# Patient Record
Sex: MALE | Race: WHITE | NOT HISPANIC OR LATINO | Employment: OTHER | URBAN - METROPOLITAN AREA
[De-identification: names, ages, dates, MRNs, and addresses within clinical notes are randomized per-mention and may not be internally consistent; named-entity substitution may affect disease eponyms.]

---

## 2022-01-05 ENCOUNTER — HOSPITAL ENCOUNTER (EMERGENCY)
Facility: HOSPITAL | Age: 87
Discharge: HOME | End: 2022-01-05
Attending: EMERGENCY MEDICINE
Payer: MEDICARE

## 2022-01-05 VITALS
DIASTOLIC BLOOD PRESSURE: 76 MMHG | BODY MASS INDEX: 28.44 KG/M2 | HEIGHT: 72 IN | OXYGEN SATURATION: 98 % | HEART RATE: 79 BPM | RESPIRATION RATE: 18 BRPM | SYSTOLIC BLOOD PRESSURE: 176 MMHG | WEIGHT: 210 LBS | TEMPERATURE: 97 F

## 2022-01-05 DIAGNOSIS — E86.0 DEHYDRATION: Primary | ICD-10-CM

## 2022-01-05 DIAGNOSIS — U07.1 COVID: ICD-10-CM

## 2022-01-05 LAB
ALBUMIN SERPL-MCNC: 3.6 G/DL (ref 3.4–5)
ALP SERPL-CCNC: 96 IU/L (ref 35–126)
ALT SERPL-CCNC: 27 IU/L (ref 16–63)
ANION GAP SERPL CALC-SCNC: 7 MEQ/L (ref 3–15)
AST SERPL-CCNC: 24 IU/L (ref 15–41)
BASOPHILS # BLD: 0 K/UL (ref 0.01–0.1)
BASOPHILS NFR BLD: 0 %
BILIRUB SERPL-MCNC: 0.4 MG/DL (ref 0.3–1.2)
BUN SERPL-MCNC: 18 MG/DL (ref 8–20)
BURR CELLS BLD QL SMEAR: ABNORMAL
CALCIUM SERPL-MCNC: 8.9 MG/DL (ref 8.9–10.3)
CHLORIDE SERPL-SCNC: 104 MEQ/L (ref 98–109)
CO2 SERPL-SCNC: 26 MEQ/L (ref 22–32)
CREAT SERPL-MCNC: 0.8 MG/DL (ref 0.8–1.3)
DIFFERENTIAL METHOD BLD: ABNORMAL
EOSINOPHIL # BLD: 0.01 K/UL (ref 0.04–0.54)
EOSINOPHIL NFR BLD: 0.3 %
ERYTHROCYTE [DISTWIDTH] IN BLOOD BY AUTOMATED COUNT: 13 % (ref 11.6–14.4)
GFR SERPL CREATININE-BSD FRML MDRD: >60 ML/MIN/1.73M*2
GLUCOSE SERPL-MCNC: 126 MG/DL (ref 70–99)
HCT VFR BLDCO AUTO: 41.3 % (ref 40.1–51)
HGB BLD-MCNC: 13.8 G/DL (ref 13.7–17.5)
IMM GRANULOCYTES # BLD AUTO: 0.01 K/UL (ref 0–0.08)
IMM GRANULOCYTES NFR BLD AUTO: 0.3 %
LYMPHOCYTES # BLD: 1.1 K/UL (ref 1.2–3.5)
LYMPHOCYTES NFR BLD: 30.2 %
MCH RBC QN AUTO: 32.8 PG (ref 28–33.2)
MCHC RBC AUTO-ENTMCNC: 33.4 G/DL (ref 32.2–36.5)
MCV RBC AUTO: 98.1 FL (ref 83–98)
MONOCYTES # BLD: 0.37 K/UL (ref 0.3–1)
MONOCYTES NFR BLD: 10.2 %
NEUTROPHILS # BLD: 2.15 K/UL (ref 1.7–7)
NEUTS SEG NFR BLD: 59 %
NRBC BLD-RTO: 0 %
PDW BLD AUTO: 11.5 FL (ref 9.4–12.4)
PLAT MORPH BLD: NORMAL
PLATELET # BLD AUTO: 130 K/UL (ref 150–350)
PLATELET # BLD EST: ABNORMAL 10*3/UL
POTASSIUM SERPL-SCNC: 4.3 MEQ/L (ref 3.6–5.1)
PROT SERPL-MCNC: 6.5 G/DL (ref 6–8.2)
RBC # BLD AUTO: 4.21 M/UL (ref 4.5–5.8)
SODIUM SERPL-SCNC: 137 MEQ/L (ref 136–144)
WBC # BLD AUTO: 3.64 K/UL (ref 3.8–10.5)

## 2022-01-05 PROCEDURE — 80053 COMPREHEN METABOLIC PANEL: CPT | Performed by: EMERGENCY MEDICINE

## 2022-01-05 PROCEDURE — 85025 COMPLETE CBC W/AUTO DIFF WBC: CPT | Performed by: EMERGENCY MEDICINE

## 2022-01-05 PROCEDURE — 36415 COLL VENOUS BLD VENIPUNCTURE: CPT

## 2022-01-05 PROCEDURE — 96360 HYDRATION IV INFUSION INIT: CPT

## 2022-01-05 PROCEDURE — 99284 EMERGENCY DEPT VISIT MOD MDM: CPT | Mod: 25

## 2022-01-05 PROCEDURE — 25800000 HC PHARMACY IV SOLUTIONS: Performed by: PHYSICIAN ASSISTANT

## 2022-01-05 PROCEDURE — 3E0337Z INTRODUCTION OF ELECTROLYTIC AND WATER BALANCE SUBSTANCE INTO PERIPHERAL VEIN, PERCUTANEOUS APPROACH: ICD-10-PCS | Performed by: EMERGENCY MEDICINE

## 2022-01-05 RX ORDER — MIRABEGRON 50 MG/1
50 TABLET, FILM COATED, EXTENDED RELEASE ORAL DAILY
COMMUNITY
Start: 2021-12-01 | End: 2023-07-01

## 2022-01-05 RX ORDER — VALACYCLOVIR HYDROCHLORIDE 500 MG/1
500 TABLET, FILM COATED ORAL EVERY MORNING
COMMUNITY
Start: 2022-01-04

## 2022-01-05 RX ORDER — METOPROLOL SUCCINATE 25 MG/1
12.5 TABLET, EXTENDED RELEASE ORAL
Status: ON HOLD | COMMUNITY
Start: 2021-12-20 | End: 2023-07-01 | Stop reason: SDUPTHER

## 2022-01-05 RX ORDER — AMLODIPINE BESYLATE 2.5 MG/1
TABLET ORAL
COMMUNITY
Start: 2021-12-19 | End: 2023-07-01

## 2022-01-05 RX ADMIN — SODIUM CHLORIDE 1000 ML: 9 INJECTION, SOLUTION INTRAVENOUS at 13:25

## 2022-01-05 ASSESSMENT — ENCOUNTER SYMPTOMS
COLOR CHANGE: 0
WEAKNESS: 0
SORE THROAT: 0
SEIZURES: 0
ABDOMINAL PAIN: 0
NECK PAIN: 0
SPEECH DIFFICULTY: 0
AGITATION: 0
DIAPHORESIS: 0
BACK PAIN: 0
FACIAL SWELLING: 0
HEADACHES: 0
ACTIVITY CHANGE: 0
NAUSEA: 0
FEVER: 0
DIARRHEA: 0
HEMATURIA: 0
SHORTNESS OF BREATH: 0
WHEEZING: 0
VOMITING: 0
COUGH: 1
DIFFICULTY URINATING: 0

## 2022-01-05 NOTE — ED ATTESTATION NOTE
The patient was evaluated and managed by the PA/NP/resident.  I have discussed the case with the PA/NP/resident and I have reviewed the patients lab work.  I am in agreement with the ED workup and treatment plan.  I will continue to be available for consultation as needed.     Godshall, Duane K, MD  01/05/22 1675

## 2022-01-06 NOTE — ED PROVIDER NOTES
"Emergency Medicine Note  HPI   HISTORY OF PRESENT ILLNESS     Patient is an 89-year-old male with a history of heart disease, prostate cancer who presents today with dehydration.  Patient states that he was diagnosed with CovidSeveral days ago.  Patient states that he took ivermectin and \"feels better\".  Patient went to receive monoclonal antibodies in [facility] but they were unable to obtain an IV.  Patient went back another day and they were still unable to obtain an IV.  Patient states that they told him that he must be dehydrated and sent him to the emergency room for hydration.  Patient has no other complaints.  Denied current fever, congestion, shortness of breath, syncope, seizure.            Patient History   PAST HISTORY     Reviewed from Nursing Triage:       Past Medical History:   Diagnosis Date   • CAD (coronary artery disease)    • Hx of ventricular tachycardia    • Prostate cancer (CMS/HCC)        History reviewed. No pertinent surgical history.    History reviewed. No pertinent family history.    Social History     Tobacco Use   • Smoking status: Never Smoker   • Smokeless tobacco: Never Used   Vaping Use   • Vaping Use: Never used   Substance Use Topics   • Alcohol use: Yes     Comment: socially   • Drug use: Never         Review of Systems   REVIEW OF SYSTEMS     Review of Systems   Constitutional: Negative for activity change, diaphoresis and fever.   HENT: Negative for facial swelling and sore throat.    Eyes: Negative for visual disturbance.   Respiratory: Positive for cough. Negative for shortness of breath and wheezing.    Cardiovascular: Negative for chest pain and leg swelling.   Gastrointestinal: Negative for abdominal pain, diarrhea, nausea and vomiting.   Genitourinary: Negative for difficulty urinating and hematuria.   Musculoskeletal: Negative for back pain and neck pain.   Skin: Negative for color change and pallor.   Neurological: Negative for seizures, syncope, speech difficulty, " weakness and headaches.   Psychiatric/Behavioral: Negative for agitation and behavioral problems.   All other systems reviewed and are negative.        VITALS     ED Vitals    Date/Time Temp Pulse Resp BP SpO2 [facility]   01/05/22 1425 -- 79 18 176/76 98 % [facility]   01/05/22 1315 -- 72 18 157/72 96 % [facility]   01/05/22 1226 36.1 °C (97 °F) 76 18 147/65 98 % [facility]                       Physical Exam   PHYSICAL EXAM     Physical Exam  Vitals and nursing note reviewed.   Constitutional:       General: He is not in acute distress.     Appearance: Normal appearance. He is well-developed and normal weight. He is not ill-appearing, toxic-appearing or diaphoretic.   HENT:      Head: Normocephalic and atraumatic.      Nose: Nose normal.      Mouth/Throat:      Mouth: Mucous membranes are moist.   Eyes:      Conjunctiva/sclera: Conjunctivae normal.   Cardiovascular:      Rate and Rhythm: Normal rate and regular rhythm.   Pulmonary:      Effort: Pulmonary effort is normal.      Breath sounds: Normal breath sounds.   Abdominal:      General: There is no distension.      Palpations: Abdomen is soft. There is no mass.      Tenderness: There is no abdominal tenderness.   Musculoskeletal:         General: No tenderness or deformity. Normal range of motion.      Cervical back: Normal range of motion.   Skin:     General: Skin is warm and dry.      Findings: No rash.   Neurological:      General: No focal deficit present.      Mental Status: He is alert and oriented to person, place, and time. Mental status is at baseline.      Motor: No weakness.      Coordination: Coordination normal.      Gait: Gait normal.   Psychiatric:         Mood and Affect: Mood normal.         Behavior: Behavior normal.         Thought Content: Thought content normal.         Judgment: Judgment normal.           PROCEDURES     Procedures     DATA     Results     Procedure Component Value Units Date/Time    [facility] Draw Panel [[id number]] Collected: 01/05/22 1242    Specimen:  Blood, Venous Updated: 01/05/22 2100    Narrative:      The following orders were created for panel order [facility] Draw Panel.  Procedure                               Abnormality         Status                     ---------                               -----------         ------                     RAINBOW RED[[id number]]                                      Final result               CINTHIA LT BLUE[[id number]]                                  Final result               CINTHIA GOLD[[id number]]                                     Final result                 Please view results for these tests on the individual orders.    RAINBOW RED [[id number]] Collected: 01/05/22 1242    Specimen: Blood, Venous Updated: 01/05/22 2100    CINTHIA LT BLUE [[id number]] Collected: 01/05/22 1242    Specimen: Blood, Venous Updated: 01/05/22 2100    RAINBOW GOLD [[id number]] Collected: 01/05/22 1242    Specimen: Blood, Venous Updated: 01/05/22 2100    CBC and differential [[id number]]  (Abnormal) Collected: 01/05/22 1242    Specimen: Blood, Venous Updated: 01/05/22 1313     WBC 3.64 K/uL      RBC 4.21 M/uL      Hemoglobin 13.8 g/dL      Hematocrit 41.3 %      MCV 98.1 fL      MCH 32.8 pg      MCHC 33.4 g/dL      RDW 13.0 %      Platelets 130 K/uL      Comment: ALL RESULTS HAVE BEEN RECHECKED. SPECIMEN QUALITY CHECKED        MPV 11.5 fL      Differential Type Auto     nRBC 0.0 %      Immature Granulocytes 0.3 %      Neutrophils 59.0 %      Lymphocytes 30.2 %      Monocytes 10.2 %      Eosinophils 0.3 %      Basophils 0.0 %      Immature Granulocytes, Absolute 0.01 K/uL      Neutrophils, Absolute 2.15 K/uL      Lymphocytes, Absolute 1.10 K/uL      Monocytes, Absolute 0.37 K/uL      Eosinophils, Absolute 0.01 K/uL      Basophils, Absolute 0.00 K/uL      PLT Morphology Normal     Platelet Estimate Decreased (51,000-149,000)     Naomie Cells Occasional    Comprehensive metabolic panel [[id number]]  (Abnormal) Collected: 01/05/22 1242    Specimen:  "Blood, Venous Updated: 01/05/22 1310     Sodium 137 mEQ/L      Potassium 4.3 mEQ/L      Comment: Results obtained on plasma. Plasma Potassium values may be up to 0.4 mEQ/L less than serum values. The differences may be greater for patients with high platelet or white cell counts.        Chloride 104 mEQ/L      CO2 26 mEQ/L      BUN 18 mg/dL      Creatinine 0.8 mg/dL      Glucose 126 mg/dL      Calcium 8.9 mg/dL      AST (SGOT) 24 IU/L      ALT (SGPT) 27 IU/L      Alkaline Phosphatase 96 IU/L      Total Protein 6.5 g/dL      Comment: Test performed on plasma which typically contains approximately 0.4 g/dL more protein than serum.        Albumin 3.6 g/dL      Bilirubin, Total 0.4 mg/dL      eGFR >60.0 mL/min/1.73m*2      Anion Gap 7 mEQ/L           Imaging Results    None         No orders to display       Scoring tools                                 ED Course & MDM   MDM / ED COURSE and CLINICAL IMPRESSIONS     [facility]    ED Course as of 01/05/22 2145 Wed Jan 05, 2022   1258 I: \"dehydrated\", covid +  P: labs, IVF [ANTHONY]   1327 Labs show NAD.  No evidence of dehydration.  Pulse ox normal throughout stay.  Will d/c home c PCP f/u.  Pt agreeable to plan and verbalized understanding.   [ANTHONY]      ED Course User Index  [ANTHONY] Sophia Hill PA C         Clinical Impressions as of 01/05/22 2145   Dehydration   COVID            Sophia Hill PA C  01/05/22 2145    "

## 2023-07-01 ENCOUNTER — HOSPITAL ENCOUNTER (OUTPATIENT)
Facility: HOSPITAL | Age: 88
Setting detail: OBSERVATION
Discharge: HOME | End: 2023-07-01
Attending: EMERGENCY MEDICINE | Admitting: STUDENT IN AN ORGANIZED HEALTH CARE EDUCATION/TRAINING PROGRAM
Payer: MEDICARE

## 2023-07-01 ENCOUNTER — APPOINTMENT (EMERGENCY)
Dept: RADIOLOGY | Facility: HOSPITAL | Age: 88
End: 2023-07-01
Attending: EMERGENCY MEDICINE
Payer: MEDICARE

## 2023-07-01 VITALS
DIASTOLIC BLOOD PRESSURE: 53 MMHG | BODY MASS INDEX: 27.13 KG/M2 | HEIGHT: 72 IN | TEMPERATURE: 97.5 F | OXYGEN SATURATION: 96 % | RESPIRATION RATE: 18 BRPM | WEIGHT: 200.3 LBS | SYSTOLIC BLOOD PRESSURE: 104 MMHG | HEART RATE: 75 BPM

## 2023-07-01 DIAGNOSIS — I48.91 ATRIAL FIBRILLATION WITH RAPID VENTRICULAR RESPONSE (CMS/HCC): ICD-10-CM

## 2023-07-01 DIAGNOSIS — I48.91 NEW ONSET A-FIB (CMS/HCC): Primary | ICD-10-CM

## 2023-07-01 PROBLEM — B00.9 HERPES SIMPLEX: Status: ACTIVE | Noted: 2023-07-01

## 2023-07-01 PROBLEM — R32 INCONTINENCE: Status: ACTIVE | Noted: 2023-07-01

## 2023-07-01 PROBLEM — E78.5 HLD (HYPERLIPIDEMIA): Status: RESOLVED | Noted: 2023-07-01 | Resolved: 2023-07-01

## 2023-07-01 PROBLEM — I10 HTN (HYPERTENSION): Status: ACTIVE | Noted: 2023-07-01

## 2023-07-01 PROBLEM — E78.5 HLD (HYPERLIPIDEMIA): Status: ACTIVE | Noted: 2023-07-01

## 2023-07-01 PROBLEM — C61 PROSTATE CA (CMS/HCC): Status: ACTIVE | Noted: 2023-07-01

## 2023-07-01 LAB
ALBUMIN SERPL-MCNC: 3.8 G/DL (ref 3.5–5.7)
ALP SERPL-CCNC: 99 IU/L (ref 34–104)
ALT SERPL-CCNC: 19 IU/L (ref 7–52)
ANION GAP SERPL CALC-SCNC: 6 MEQ/L (ref 3–15)
AST SERPL-CCNC: 15 IU/L (ref 13–39)
ATRIAL RATE: 104
BASOPHILS # BLD: 0.01 K/UL (ref 0.01–0.1)
BASOPHILS NFR BLD: 0.2 %
BILIRUB SERPL-MCNC: 0.3 MG/DL (ref 0.3–1)
BUN SERPL-MCNC: 35 MG/DL (ref 7–25)
CALCIUM SERPL-MCNC: 9.2 MG/DL (ref 8.6–10.3)
CHLORIDE SERPL-SCNC: 108 MEQ/L (ref 98–107)
CO2 SERPL-SCNC: 25 MEQ/L (ref 21–31)
CREAT SERPL-MCNC: 1.1 MG/DL (ref 0.7–1.3)
DIFFERENTIAL METHOD BLD: ABNORMAL
EOSINOPHIL # BLD: 0.09 K/UL (ref 0.04–0.54)
EOSINOPHIL NFR BLD: 1.8 %
ERYTHROCYTE [DISTWIDTH] IN BLOOD BY AUTOMATED COUNT: 12.4 % (ref 11.6–14.4)
GFR SERPL CREATININE-BSD FRML MDRD: >60 ML/MIN/1.73M*2
GLUCOSE SERPL-MCNC: 194 MG/DL (ref 70–99)
HCT VFR BLDCO AUTO: 37.1 % (ref 40.1–51)
HGB BLD-MCNC: 12.3 G/DL (ref 13.7–17.5)
IMM GRANULOCYTES # BLD AUTO: 0.01 K/UL (ref 0–0.08)
IMM GRANULOCYTES NFR BLD AUTO: 0.2 %
LYMPHOCYTES # BLD: 1.42 K/UL (ref 1.2–3.5)
LYMPHOCYTES NFR BLD: 28.9 %
MAGNESIUM SERPL-MCNC: 1.8 MG/DL (ref 1.9–2.7)
MCH RBC QN AUTO: 31.9 PG (ref 28–33.2)
MCHC RBC AUTO-ENTMCNC: 33.2 G/DL (ref 32.2–36.5)
MCV RBC AUTO: 96.1 FL (ref 83–98)
MONOCYTES # BLD: 0.51 K/UL (ref 0.3–1)
MONOCYTES NFR BLD: 10.4 %
NEUTROPHILS # BLD: 2.88 K/UL (ref 1.7–7)
NEUTS SEG NFR BLD: 58.5 %
NRBC BLD-RTO: 0 %
PDW BLD AUTO: 10.9 FL (ref 9.4–12.4)
PLATELET # BLD AUTO: 146 K/UL (ref 150–350)
POTASSIUM SERPL-SCNC: 4.1 MEQ/L (ref 3.5–5.1)
PROT SERPL-MCNC: 6.5 G/DL (ref 6.4–8.9)
QRS DURATION: 84
QT INTERVAL: 312
QTC CALCULATION(BAZETT): 469
R AXIS: 15
RBC # BLD AUTO: 3.86 M/UL (ref 4.5–5.8)
SODIUM SERPL-SCNC: 139 MEQ/L (ref 136–145)
T WAVE AXIS: 6
TROPONIN I SERPL HS-MCNC: 12.6 PG/ML
TROPONIN I SERPL HS-MCNC: 13.6 PG/ML
TSH SERPL DL<=0.05 MIU/L-ACNC: 1.81 MIU/L (ref 0.34–5.6)
VENTRICULAR RATE: 136
WBC # BLD AUTO: 4.92 K/UL (ref 3.8–10.5)

## 2023-07-01 PROCEDURE — G0378 HOSPITAL OBSERVATION PER HR: HCPCS

## 2023-07-01 PROCEDURE — 93005 ELECTROCARDIOGRAM TRACING: CPT | Performed by: EMERGENCY MEDICINE

## 2023-07-01 PROCEDURE — 99999 PR OFFICE/OUTPT VISIT,PROCEDURE ONLY: CPT | Performed by: INTERNAL MEDICINE

## 2023-07-01 PROCEDURE — 99235 HOSP IP/OBS SAME DATE MOD 70: CPT | Performed by: STUDENT IN AN ORGANIZED HEALTH CARE EDUCATION/TRAINING PROGRAM

## 2023-07-01 PROCEDURE — 96366 THER/PROPH/DIAG IV INF ADDON: CPT

## 2023-07-01 PROCEDURE — 63600000 HC DRUGS/DETAIL CODE: Mod: JZ | Performed by: EMERGENCY MEDICINE

## 2023-07-01 PROCEDURE — 84484 ASSAY OF TROPONIN QUANT: CPT | Mod: 91 | Performed by: EMERGENCY MEDICINE

## 2023-07-01 PROCEDURE — 96372 THER/PROPH/DIAG INJ SC/IM: CPT

## 2023-07-01 PROCEDURE — 25800000 HC PHARMACY IV SOLUTIONS

## 2023-07-01 PROCEDURE — 84443 ASSAY THYROID STIM HORMONE: CPT | Performed by: EMERGENCY MEDICINE

## 2023-07-01 PROCEDURE — 25000000 HC PHARMACY GENERAL: Performed by: EMERGENCY MEDICINE

## 2023-07-01 PROCEDURE — 96365 THER/PROPH/DIAG IV INF INIT: CPT

## 2023-07-01 PROCEDURE — 85025 COMPLETE CBC W/AUTO DIFF WBC: CPT | Performed by: EMERGENCY MEDICINE

## 2023-07-01 PROCEDURE — 83735 ASSAY OF MAGNESIUM: CPT | Performed by: EMERGENCY MEDICINE

## 2023-07-01 PROCEDURE — 84484 ASSAY OF TROPONIN QUANT: CPT | Performed by: EMERGENCY MEDICINE

## 2023-07-01 PROCEDURE — 36415 COLL VENOUS BLD VENIPUNCTURE: CPT | Performed by: EMERGENCY MEDICINE

## 2023-07-01 PROCEDURE — 96376 TX/PRO/DX INJ SAME DRUG ADON: CPT

## 2023-07-01 PROCEDURE — 80053 COMPREHEN METABOLIC PANEL: CPT | Performed by: EMERGENCY MEDICINE

## 2023-07-01 PROCEDURE — 96361 HYDRATE IV INFUSION ADD-ON: CPT

## 2023-07-01 PROCEDURE — 99285 EMERGENCY DEPT VISIT HI MDM: CPT | Mod: 25

## 2023-07-01 PROCEDURE — 71045 X-RAY EXAM CHEST 1 VIEW: CPT

## 2023-07-01 PROCEDURE — 63700000 HC SELF-ADMINISTRABLE DRUG

## 2023-07-01 PROCEDURE — 63600000 HC DRUGS/DETAIL CODE: Mod: JZ

## 2023-07-01 RX ORDER — IBUPROFEN 200 MG
16-32 TABLET ORAL AS NEEDED
Status: DISCONTINUED | OUTPATIENT
Start: 2023-07-01 | End: 2023-07-01 | Stop reason: HOSPADM

## 2023-07-01 RX ORDER — DEXTROSE 40 %
15-30 GEL (GRAM) ORAL AS NEEDED
Status: DISCONTINUED | OUTPATIENT
Start: 2023-07-01 | End: 2023-07-01 | Stop reason: HOSPADM

## 2023-07-01 RX ORDER — VALACYCLOVIR HYDROCHLORIDE 500 MG/1
500 TABLET, FILM COATED ORAL EVERY 24 HOURS
Status: DISCONTINUED | OUTPATIENT
Start: 2023-07-01 | End: 2023-07-01 | Stop reason: HOSPADM

## 2023-07-01 RX ORDER — DEXTROSE 50 % IN WATER (D50W) INTRAVENOUS SYRINGE
25 AS NEEDED
Status: DISCONTINUED | OUTPATIENT
Start: 2023-07-01 | End: 2023-07-01 | Stop reason: HOSPADM

## 2023-07-01 RX ORDER — ENOXAPARIN SODIUM 100 MG/ML
1 INJECTION SUBCUTANEOUS
Status: DISCONTINUED | OUTPATIENT
Start: 2023-07-01 | End: 2023-07-01 | Stop reason: HOSPADM

## 2023-07-01 RX ORDER — NITROGLYCERIN 0.4 MG/1
0.4 TABLET SUBLINGUAL EVERY 5 MIN PRN
Status: DISCONTINUED | OUTPATIENT
Start: 2023-07-01 | End: 2023-07-01 | Stop reason: HOSPADM

## 2023-07-01 RX ORDER — ONDANSETRON HYDROCHLORIDE 2 MG/ML
4 INJECTION, SOLUTION INTRAVENOUS EVERY 8 HOURS PRN
Status: DISCONTINUED | OUTPATIENT
Start: 2023-07-01 | End: 2023-07-01 | Stop reason: HOSPADM

## 2023-07-01 RX ORDER — ONDANSETRON 4 MG/1
4 TABLET, ORALLY DISINTEGRATING ORAL EVERY 8 HOURS PRN
Status: DISCONTINUED | OUTPATIENT
Start: 2023-07-01 | End: 2023-07-01 | Stop reason: HOSPADM

## 2023-07-01 RX ORDER — HEPARIN SODIUM 5000 [USP'U]/ML
5000 INJECTION, SOLUTION INTRAVENOUS; SUBCUTANEOUS EVERY 8 HOURS
Status: DISCONTINUED | OUTPATIENT
Start: 2023-07-01 | End: 2023-07-01

## 2023-07-01 RX ORDER — SODIUM CHLORIDE 9 MG/ML
INJECTION, SOLUTION INTRAVENOUS CONTINUOUS
Status: DISCONTINUED | OUTPATIENT
Start: 2023-07-01 | End: 2023-07-01 | Stop reason: HOSPADM

## 2023-07-01 RX ORDER — DILTIAZEM HCL/D5W 125 MG/125
5-15 PLASTIC BAG, INJECTION (ML) INTRAVENOUS
Status: DISCONTINUED | OUTPATIENT
Start: 2023-07-01 | End: 2023-07-01 | Stop reason: HOSPADM

## 2023-07-01 RX ORDER — ACETAMINOPHEN 325 MG/1
650 TABLET ORAL EVERY 4 HOURS PRN
Status: DISCONTINUED | OUTPATIENT
Start: 2023-07-01 | End: 2023-07-01 | Stop reason: HOSPADM

## 2023-07-01 RX ORDER — METOPROLOL SUCCINATE 25 MG/1
25 TABLET, EXTENDED RELEASE ORAL DAILY
Qty: 30 TABLET | Refills: 0 | Status: SHIPPED | OUTPATIENT
Start: 2023-07-01 | End: 2023-10-31 | Stop reason: ENTERED-IN-ERROR

## 2023-07-01 RX ORDER — POTASSIUM CHLORIDE 750 MG/1
40 TABLET, EXTENDED RELEASE ORAL AS NEEDED
Status: DISCONTINUED | OUTPATIENT
Start: 2023-07-01 | End: 2023-07-01 | Stop reason: HOSPADM

## 2023-07-01 RX ORDER — POTASSIUM CHLORIDE 750 MG/1
20 TABLET, EXTENDED RELEASE ORAL AS NEEDED
Status: DISCONTINUED | OUTPATIENT
Start: 2023-07-01 | End: 2023-07-01 | Stop reason: HOSPADM

## 2023-07-01 RX ORDER — DILTIAZEM HYDROCHLORIDE 5 MG/ML
10 INJECTION INTRAVENOUS ONCE
Status: COMPLETED | OUTPATIENT
Start: 2023-07-01 | End: 2023-07-01

## 2023-07-01 RX ORDER — ATROPINE SULFATE 0.1 MG/ML
0.5 INJECTION INTRAVENOUS EVERY 5 MIN PRN
Status: DISCONTINUED | OUTPATIENT
Start: 2023-07-01 | End: 2023-07-01 | Stop reason: HOSPADM

## 2023-07-01 RX ADMIN — VALACYCLOVIR 500 MG: 500 TABLET, FILM COATED ORAL at 08:52

## 2023-07-01 RX ADMIN — METOPROLOL SUCCINATE 12.5 MG: 25 TABLET, EXTENDED RELEASE ORAL at 08:52

## 2023-07-01 RX ADMIN — SODIUM CHLORIDE: 9 INJECTION, SOLUTION INTRAVENOUS at 04:19

## 2023-07-01 RX ADMIN — Medication 5 MG/HR: at 01:41

## 2023-07-01 RX ADMIN — DILTIAZEM HYDROCHLORIDE 10 MG: 5 INJECTION INTRAVENOUS at 01:40

## 2023-07-01 RX ADMIN — MAGNESIUM SULFATE IN DEXTROSE 1 G: 10 INJECTION, SOLUTION INTRAVENOUS at 03:11

## 2023-07-01 RX ADMIN — ENOXAPARIN SODIUM 100 MG: 100 INJECTION SUBCUTANEOUS at 04:33

## 2023-07-01 ASSESSMENT — COGNITIVE AND FUNCTIONAL STATUS - GENERAL
WALKING IN HOSPITAL ROOM: 4 - NONE
MOVING TO AND FROM BED TO CHAIR: 4 - NONE
WALKING IN HOSPITAL ROOM: 4 - NONE
STANDING UP FROM CHAIR USING ARMS: 4 - NONE
STANDING UP FROM CHAIR USING ARMS: 4 - NONE
CLIMB 3 TO 5 STEPS WITH RAILING: 3 - A LITTLE
MOVING TO AND FROM BED TO CHAIR: 4 - NONE
CLIMB 3 TO 5 STEPS WITH RAILING: 3 - A LITTLE

## 2023-07-01 NOTE — ASSESSMENT & PLAN NOTE
· New onset afib with rvr rate in 130's  · TSH wnl, Trop WNL, mg slightly low 1.8  · Afebrile, without leukocytosis  · Started on diltiazem gtt with bolus  · Continue with home regimen lopressor  · Monitor on telemetry  · Consult to cardiology appreciated   · Monitor VS   · Obtain echo  · CHADS-VASc score of 3 moderate high risk  · To start therapeutic lovenox  · NPO for tentative cardioversion

## 2023-07-01 NOTE — ED PROVIDER NOTES
Emergency Medicine Note  HPI   HISTORY OF PRESENT ILLNESS     89-year-old male with history of hypertension, hyperlipidemia presents to the emergency department for evaluation of lightheadedness beginning approximately 3 hours ago associated with some pressure in his right chest.  Denies any palpitations.  No syncope.  No recent fevers or cough.  No vomiting or diarrhea.  His wife had him check his Apple Watch and it noted that he was in atrial fibrillation which the patient has no known history of.              Patient History   PAST HISTORY     Reviewed from Nursing Triage:       Past Medical History:   Diagnosis Date   • CAD (coronary artery disease)    • Hx of ventricular tachycardia    • Prostate cancer (CMS/HCC)        History reviewed. No pertinent surgical history.    History reviewed. No pertinent family history.    Social History     Tobacco Use   • Smoking status: Never   • Smokeless tobacco: Never   Vaping Use   • Vaping Use: Never used   Substance Use Topics   • Alcohol use: Yes     Comment: socially   • Drug use: Never         Review of Systems   REVIEW OF SYSTEMS     Review of Systems   Constitutional: Negative for fever.   Respiratory: Positive for chest tightness.    Neurological: Positive for light-headedness. Negative for syncope.   All other systems reviewed and are negative.        VITALS     ED Vitals    Date/Time Temp Pulse Resp BP SpO2 [facility]   07/01/23 0031 36.7 °C (98 °F) 128 16 195/79 95 % [facility]                       Physical Exam   PHYSICAL EXAM     Physical Exam  Vitals and nursing note reviewed.   Constitutional:       Appearance: Normal appearance. He is normal weight. He is not toxic-appearing.   HENT:      Head: Normocephalic and atraumatic.      Nose: Nose normal.      Mouth/Throat:      Mouth: Mucous membranes are moist.   Eyes:      Extraocular Movements: Extraocular movements intact.      Pupils: Pupils are equal, round, and reactive to light.   Cardiovascular:      Rate and Rhythm:  Tachycardia present. Rhythm irregular.      Pulses: Normal pulses.   Pulmonary:      Effort: Pulmonary effort is normal.      Breath sounds: Normal breath sounds.   Abdominal:      Palpations: Abdomen is soft.      Tenderness: There is no abdominal tenderness. There is no guarding or rebound.   Musculoskeletal:         General: Normal range of motion.      Cervical back: Normal range of motion and neck supple.   Skin:     General: Skin is warm and dry.      Capillary Refill: Capillary refill takes less than 2 seconds.   Neurological:      General: No focal deficit present.      Mental Status: He is alert and oriented to person, place, and time. Mental status is at baseline.      Cranial Nerves: No cranial nerve deficit.      Sensory: No sensory deficit.      Motor: No weakness.   Psychiatric:         Mood and Affect: Mood normal.         Behavior: Behavior normal.           PROCEDURES     Procedures     DATA     Results     Procedure Component Value Units Date/Time    CBC and differential [[id number]]  (Abnormal) Collected: 07/01/23 0044    Specimen: Blood, Venous Updated: 07/01/23 0104     WBC 4.92 K/uL      RBC 3.86 M/uL      Hemoglobin 12.3 g/dL      Hematocrit 37.1 %      MCV 96.1 fL      MCH 31.9 pg      MCHC 33.2 g/dL      RDW 12.4 %      Platelets 146 K/uL      MPV 10.9 fL      Differential Type Auto     nRBC 0.0 %      Immature Granulocytes 0.2 %      Neutrophils 58.5 %      Lymphocytes 28.9 %      Monocytes 10.4 %      Eosinophils 1.8 %      Basophils 0.2 %      Immature Granulocytes, Absolute 0.01 K/uL      Neutrophils, Absolute 2.88 K/uL      Lymphocytes, Absolute 1.42 K/uL      Monocytes, Absolute 0.51 K/uL      Eosinophils, Absolute 0.09 K/uL      Basophils, Absolute 0.01 K/uL     RAINBOW LT BLUE [[id number]] Collected: 07/01/23 0044    Specimen: Blood, Venous Updated: 07/01/23 0101    RAINBOW GOLD [[id number]] Collected: 07/01/23 0044    Specimen: Blood, Venous Updated: 07/01/23 0101    HS Troponin (with  2 hour reflex) [[id number]] Collected: 07/01/23 0044    Specimen: Blood, Venous Updated: 07/01/23 0101    Comprehensive metabolic panel [[id number]] Collected: 07/01/23 0044    Specimen: Blood, Venous Updated: 07/01/23 0101    [facility] Draw Panel [[id number]] Collected: 07/01/23 0044    Specimen: Blood, Venous Updated: 07/01/23 0100    Narrative:      The following orders were created for panel order [facility] Draw Panel.  Procedure                               Abnormality         Status                     ---------                               -----------         ------                     RAINBOW RED[[id number]]                                      In process                 RAINBOW LT BLUE[[id number]]                                  In process                 RAINBOW GOLD[[id number]]                                     In process                   Please view results for these tests on the individual orders.    RAINBOW RED [[id number]] Collected: 07/01/23 0044    Specimen: Blood, Venous Updated: 07/01/23 0100          Imaging Results    None         ECG 12 lead    (Results Pending)       Scoring tools                                  ED Course & MDM   MDM / ED COURSE / CLINICAL IMPRESSION / DISPO     Medical Decision Making  89 yo M with history of HTN, HLD p/w chest tightness/ lightheadedness. Found to be in new onset afib here. Low susp for ACS, PE, dissection. Plan labs, CXR, IV dilt for rate control. Likely admission for cardiology eval.     Amount and/or Complexity of Data Reviewed  Labs: ordered.  Radiology: ordered.  ECG/medicine tests: ordered.      Risk  Prescription drug management.          ED Course as of 07/01/23 0305   Sat Jul 01, 2023   0243 HR downtrending. Currently on 10 mg dilt gtt [STEPHANI]   [place]5 [place] [facility] [place]PHANI]      ED Course User Index  [STEPHANI] Jaison Almodovar MD     Clinical Impression      None               Jaison Almodovar MD  07/01/23 0249       Jaison Almodovar MD  07/03/23 1126

## 2023-07-01 NOTE — NURSING NOTE
Pt d/c in stable condition via private vehicle with wife. All d/c instructions reviewed and questions answered. coupon for eliquis sent with patient. IV removed and cardiac monitor removed. All belongings sent with pt. vss at time of d/c

## 2023-07-01 NOTE — CONSULTS
REASON FOR CONSULT:     CONSULT FROM: Nazario Kasper MD    ------------------------------------------------------------------------------------------------------------------------------------------  HISTORY OF PRESENTING ILLNESS  ------------------------------------------------------------------------------------------------------------------------------------------  Jose Fuentes is a 89 y.o. male who follows with a cardiologist at [facility].  He has no history of coronary artery disease or arrhythmias.  He does have a history of hypertension.  He noticed while sitting that he was having some palpitations and checked his apple watch and was in atrial fibrillation at a heart rate of about 130 bpm.  He has never had this in the past.  He tells me he has been compliant with his medications.    Normally he is active and does not have any exertional chest pain or pressure.    Upon arrival to the emergency room he was found to be in atrial fibrillation initially his heart rate was fast.  He was started on IV Cardizem and his heart rate decreased.  The Cardizem has been off for several hours when his heart rate was in the 40s to 50s.  Currently he remains in atrial fibrillation at a heart rate of approximately 70 bpm.  He is asymptomatic.    Review of records show that he has minimal coronary disease on previous cardiac catheterization.    Review of his laboratory evaluation shows a normal potassium.  His magnesium is minimally decreased.  His troponins are normal.        ------------------------------------------------------------------------------------------------------------------------------------------  PAST MEDICAL HISTORY  ------------------------------------------------------------------------------------------------------------------------------------------  Past Medical History:   Diagnosis Date   • CAD (coronary artery disease)    • Hx of ventricular tachycardia    • Prostate cancer  (CMS/HCC)      History reviewed. No pertinent surgical history.    ------------------------------------------------------------------------------------------------------------------------------------------  MEDICATIONS  ------------------------------------------------------------------------------------------------------------------------------------------  Home medications    •  metoprolol succinate XL, Take 12.5 mg by mouth.  •  valACYclovir,     Inpatient Medications    •  acetaminophen, 650 mg, oral, q4h PRN  •  atropine, 0.5 mg, intravenous, q5 min PRN  •  glucose, 16-32 g of dextrose, oral, PRN **OR** dextrose, 15-30 g of dextrose, oral, PRN **OR** glucagon, 1 mg, intramuscular, PRN **OR** dextrose 50 % in water (D50), 25 mL, intravenous, PRN  •  dilTIAZem, 5-15 mg/hr, intravenous, Titrated  •  enoxaparin, 1 mg/kg, subcutaneous, q12h INT  •  magnesium sulfate, 1 g, intravenous, PRN **OR** magnesium sulfate, 2 g, intravenous, PRN  •  metoprolol succinate XL, 12.5 mg, oral, Daily  •  nitroglycerin, 0.4 mg, sublingual, q5 min PRN  •  ondansetron ODT, 4 mg, oral, q8h PRN **OR** ondansetron, 4 mg, intravenous, q8h PRN  •  potassium chloride, 20 mEq, oral, PRN  •  potassium chloride, 40 mEq, oral, PRN  •  sodium chloride 0.9 %, , intravenous, Continuous  •  valACYclovir, 500 mg, oral, q24h    ------------------------------------------------------------------------------------------------------------------------------------------  ALLERGIES  ------------------------------------------------------------------------------------------------------------------------------------------  Sulfa (sulfonamide antibiotics) and Niacin    ------------------------------------------------------------------------------------------------------------------------------------------  SOCIAL HISTORY  ------------------------------------------------------------------------------------------------------------------------------------------  No  smoking.  Social alcohol    ------------------------------------------------------------------------------------------------------------------------------------------  FAMILY HISTORY  ------------------------------------------------------------------------------------------------------------------------------------------  No premature CAD    ------------------------------------------------------------------------------------------------------------------------------------------  REVIEW OF SYSTEMS  ------------------------------------------------------------------------------------------------------------------------------------------  Constitutional: - fever, - chills, - weakness, - weight loss  HEENT: - blurred vision, - sore throat, - hoarseness  Respiratory: - dyspnea, - cough, - hemoptysis  Cardiovascular: - chest pain, - dyspnea, - orthopnea, - PND, - edema, + palpitations, - syncope  Gastrointestinal: - nausea, - vomiting, - diarrhea, - hematemesis, - melena  Genitourinary: - dysuria, - frequency  Integument: - rash, - itching  Hematologic/lymphatic:  - bruising, - petechiae  Musculoskeletal: - arthalgias, - myalgias  Neurological: - vertigo, - tremors, - headache, - speech deficit, - focal weakness  Behavioral/Psych: - anxiety, - depression  Endocrine: - cold intolerance, - heat intolerance, - weight change    ------------------------------------------------------------------------------------------------------------------------------------------  PHYSICAL EXAM  ------------------------------------------------------------------------------------------------------------------------------------------  VITAL SIGNS:  Temp:  [36.4 °C (97.5 °F)-36.7 °C (98 °F)] 36.4 °C (97.5 °F)  Heart Rate:  [] 75  Resp:  [13-25] 18  BP: (104-195)/(53-93) 104/53  SaO2: 96%    Intake/Output Summary (Last 24 hours) at 7/1/2023 0830  Last data filed at 7/1/2023 0500  Gross per 24 hour   Intake --   Output 425 ml   Net -425 ml        PHYSICAL EXAM:  General appearance: alert and cooperative  Head: without obvious abnormality  Eyes: PERRLA, extraocular movements intact  Neck: No JVD, carotid bruits, thyromegaly  Lungs: clear to auscultation bilaterally, no crackles or wheezing  Heart:  irregularly irregular, S1-S2 normal, no murmurs, clicks, rubs or gallops  Abdomen: soft, non-tender, bowel sounds normal  Extremities: no edema, peripheral pulses present  Skin: Skin color, texture, turgor normal. No rashes or lesions  Neurologic: Alert and oriented X 3, no focal deficits    ------------------------------------------------------------------------------------------------------------------------------------------  LABS / IMAGING / EKG / TELEMETRY  ------------------------------------------------------------------------------------------------------------------------------------------  LABS:  Results from last 7 days   Lab Units 07/01/23  0044   SODIUM mEQ/L 139   POTASSIUM mEQ/L 4.1   MAGNESIUM mg/dL 1.8*   CHLORIDE mEQ/L 108*   CO2 mEQ/L 25   BUN mg/dL 35*   CREATININE mg/dL 1.1   AST IU/L 15   ALT IU/L 19     Results from last 7 days   Lab Units 07/01/23  0044   WBC K/uL 4.92   HEMOGLOBIN g/dL 12.3*   HEMATOCRIT % 37.1*   PLATELETS K/uL 146*     Lab Results   Component Value Date    HGBA1C 7.2 (H) 02/21/2023    TSH 1.81 07/01/2023     No results found for: CHOL, LDLCALC, HDL, TRIG  No results found for: BNP    IMAGING: Chest x-ray: Independently interpreted by me.  Normal.      ECG: Independently interpreted by me.  Atrial fibrillation with rapid ventricular response.  Nonspecific ST-T wave abnormalities      TELEMETRY: Independently interpreted by me.  Atrial fibrillation      ------------------------------------------------------------------------------------------------------------------------------------------  ASSESSMENT AND  PLAN  ------------------------------------------------------------------------------------------------------------------------------------------    Atrial fibrillation: Presumably new onset.  Currently off of IV Cardizem and heart rate is controlled.  He does take low-dose metoprolol at home.  He is currently anticoagulated with Lovenox.  I have discussed atrial fibrillation with the patient as well as several potential plans.  He would prefer not to wait until Monday to have a YEE cardioversion.  I would recommend that we ambulate him after breakfast and see if his heart rate remains controlled.  If not we could increase his Toprol to 25 mg twice a day as long as his blood pressure tolerates it.  I would switch him to Eliquis.  If he remains relatively asymptomatic then he could be discharged to follow-up with his cardiologist for an outpatient cardioversion.  There is no current evidence to suggest that this was ischemic in nature.  He has no signs or symptoms of congestive heart failure.  His TSH is normal.  His chest x-ray is unremarkable.    Hypertension: He takes metoprolol as an outpatient.  Recommendations as listed above.    History of prostate cancer        Ajit Marin MD  7/1/2023    Primary Care Doctor: Grace Stroud MD

## 2023-07-01 NOTE — H&P
Hospital Medicine Service -  History & Physical        CHIEF COMPLAINT   New onset Afib     HISTORY OF PRESENT ILLNESS      Jose Fuentes is a 89 y.o. male with a past medical history of hypertension, prostate CA, urinary incontinence, herpes simplex who presents with A-fib.  Patient reports he was feeling lightheaded with some right-sided chest pressure while resting.  Patient reports he checked his Apple Watch and noted he was in A-fib with a heart rate in the 130's which prompted him to come the ED.  Patient reports he has not had A-fib in the past.  Patient reports he has been compliant with his medications.  Patient currently denies headache, dizziness, lightheadedness, shortness of breath, chest pain, palpitations, recent illness, changes in medication, N/V/D, fevers, or chills.    CODE STATUS: Discussed with patient, patient would like to be a full code.    ED work-up: Vital signs 98- 128-16- 195/70 9-95% on room air.  Labs significant for BUN 35, glucose 194, mag 1.8.  ECG confirms A-fib with RVR.  Patient started on diltiazem drip with bolus in ED.  Patient to be admitted to telemetry for further treatment and evaluation.  Consult to cardiology appreciated.  Echo pending.     PAST MEDICAL AND SURGICAL HISTORY      Past Medical History:   Diagnosis Date   • CAD (coronary artery disease)    • Hx of ventricular tachycardia    • Prostate cancer (CMS/[facility])        History reviewed. No pertinent surgical history.    PCP: Grace Stroud MD    MEDICATIONS      Prior to Admission medications    Medication Sig Start Date End Date Taking? Authorizing Provider   metoprolol succinate XL 25 mg 24 hr tablet Take 12.5 mg by mouth. 12/20/21  Yes Paula Manley MD   valACYclovir 1 gram tablet  1/4/22  Yes Paula Manley MD   amLODIPine 2.5 mg tablet  12/19/21 7/1/23  Paula Manley MD   mirabegron 50 mg ER tablet Take 50 mg by mouth daily. 12/1/21 7/1/23  Paula Manley MD       ALLERGIES       Sulfa (sulfonamide antibiotics) and Niacin    FAMILY HISTORY      History reviewed. No pertinent family history.    SOCIAL HISTORY      Social History     Socioeconomic History   • Marital status:      Spouse name: None   • Number of children: None   • Years of education: None   • Highest education level: None   Tobacco Use   • Smoking status: Never   • Smokeless tobacco: Never   Vaping Use   • Vaping Use: Never used   Substance and Sexual Activity   • Alcohol use: Yes     Comment: socially   • Drug use: Never   • Sexual activity: Defer     Social Determinants of Health     Food Insecurity: No Food Insecurity (7/1/2023)    Hunger Vital Sign    • Worried About Running Out of Food in the Last Year: Never true    • Ran Out of Food in the Last Year: Never true       REVIEW OF SYSTEMS      All other systems reviewed and negative except as noted in HPI    PHYSICAL EXAMINATION      Temp:  [36.7 °C (98 °F)] 36.7 °C (98 °F)  Heart Rate:  [] 90  Resp:  [13-25] 18  BP: (116-195)/(60-93) 156/70  Body mass index is 28.48 kg/m².    Physical Exam  Vitals reviewed.   Constitutional:       General: He is not in acute distress.     Appearance: He is not ill-appearing.   HENT:      Head: Normocephalic and atraumatic.   Cardiovascular:      Rate and Rhythm: Tachycardia present. Rhythm irregular.      Pulses: Normal pulses.      Heart sounds: Normal heart sounds.   Pulmonary:      Effort: Pulmonary effort is normal.      Breath sounds: Normal breath sounds.   Abdominal:      General: Bowel sounds are normal.      Palpations: Abdomen is soft.   Musculoskeletal:         General: Normal range of motion.   Skin:     General: Skin is warm and dry.   Neurological:      General: No focal deficit present.      Mental Status: He is alert and oriented to person, place, and time.   Psychiatric:         Mood and Affect: Mood normal.         LABS / IMAGING / EKG        Labs  Results from last 7 days   Lab Units 07/01/23  0044   SODIUM  mEQ/L 139   POTASSIUM mEQ/L 4.1   CHLORIDE mEQ/L 108*   CO2 mEQ/L 25   BUN mg/dL 35*   CREATININE mg/dL 1.1   GLUCOSE mg/dL 194*   CALCIUM mg/dL 9.2     Results from last 7 days   Lab Units 07/01/23  0044   WBC K/uL 4.92   HEMOGLOBIN g/dL 12.3*   HEMATOCRIT % 37.1*   PLATELETS K/uL 146*         Imaging  No results found.      No results found for: COVID19    ECG/Telemetry  I have independently reviewed the ECG. Significant findings include Afib with RVR.    ASSESSMENT AND PLAN           Prostate CA (CMS/HCC)  Assessment & Plan  With history 20 years ago had prostatectomy   5 years ago recurrence treated with radiation    Incontinence  Assessment & Plan  · Reports having trouble with incontinence ever since his radiation 5yrs ago  · Previously on ditropan- reports it was not helpful     Herpes simplex  Assessment & Plan  With history   Takes Valtrex mantinance therapy  No recent out breaks    HTN (hypertension)  Assessment & Plan  With history  BP elevated in /79  Continue with home regimen lopressor  Monitor VS    * New onset atrial fibrillation (CMS/HCC)  Assessment & Plan  · New onset afib with rvr rate in 130's  · TSH wnl, Trop WNL, mg slightly low 1.8  · Afebrile, without leukocytosis  · Started on diltiazem gtt with bolus  · Continue with home regimen lopressor  · Monitor on telemetry  · Consult to cardiology appreciated   · Monitor VS   · Obtain echo  · CHADS-VASc score of 3 moderate high risk  · To start therapeutic lovenox  · NPO for tentative cardioversion       VTE Assessment: Padua VTE Score: 1  VTE Prophylaxis: Current anticoagulants:  enoxaparin (LOVENOX) syringe 100 mg, subcutaneous, q12h INT      Code Status: Full Code        Estimated Discharge Date: 7/3/2023  Disposition Planning: Patient to be discharged when medically stable.      BALAJI Stallworth  7/1/2023

## 2023-07-01 NOTE — ASSESSMENT & PLAN NOTE
· Reports having trouble with incontinence ever since his radiation 5yrs ago  · Previously on ditropan- reports it was not helpful

## 2023-07-01 NOTE — PLAN OF CARE
Plan of Care Review  Plan of Care Reviewed With: patient  Progress: improving  Outcome Evaluation: pt came up from ED with cardizem gtt running at 15mg. pt HR consistently dropping into 50s. gtt titrated down to 5mg and then ultimately turned off after HR continued to drop into the 40s and 50s. maintaining a HR between 40s-70s while off cardizem gtt. pt NPO since midnight and aware of plan of care with no questions asked. phone and call bell within reach, bed alarm on. will monitor

## 2023-07-02 NOTE — DISCHARGE SUMMARY
Hospital Medicine Service -  Inpatient Discharge Summary        BRIEF OVERVIEW   Patient: Jose Fuentes (2/16/1932)    Admitting Provider: Sonny Miner MD  Attending Provider: No att. providers found Attending phys phone: N/A    PCP: Grace Stroud [phone]    Admission Date: 7/1/2023  Discharge Date: 7/1/2023     DISCHARGE DIAGNOSES      Primary Discharge Diagnosis  New onset atrial fibrillation (CMS/HCC)    Secondary Discharge Diagnoses  Active Hospital Problems    Diagnosis Date Noted   • New onset atrial fibrillation (CMS/HCC) 07/01/2023   • HTN (hypertension) 07/01/2023   • Herpes simplex 07/01/2023   • Incontinence 07/01/2023   • Prostate CA (CMS/HCC) 07/01/2023      Resolved Hospital Problems    Diagnosis Date Noted Date Resolved   • HLD (hyperlipidemia) 07/01/2023 07/01/2023             SUMMARY OF HOSPITALIZATION      Presenting Problem/History of Present Illness  This is a 89 y.o. year-old male admitted on 7/1/2023 with New onset atrial fibrillation (CMS/HCC).      Hospital Course  Pt is a 89 yo male with pmhx of HTN, prostate CA, urinary incontinence, HSV, who presented with palpitations and was found to be in Afib by his apple watch at home. Pt admitted for IV Cardizem, and cardiology evaluation. Pt started on anticoagulation, HR improved with oral beta blocker transition off cardizem drip. Pt felt well, had no new symptoms and was ambulating well. Denied any new issues, labs stable. Pt seen by cardiology, offered to stay for ECHO and cardioversion, but patient want to see his outpt cardiologist and discuss it further. Pt discharged to home in stable condition, with oral beta blocker, Eliquis for anticoagulation, and to f/u with cardiology this week.       Exam on Day of Discharge  Physical Exam  Vitals and nursing note reviewed.   Constitutional:       General: He is not in acute distress.     Appearance: Normal appearance. He is not ill-appearing.   HENT:      Head: Normocephalic and  atraumatic.      Right Ear: External ear normal.      Left Ear: External ear normal.      Nose: Nose normal.   Eyes:      General: No scleral icterus.        Right eye: No discharge.         Left eye: No discharge.   Cardiovascular:      Rate and Rhythm: Normal rate. Rhythm irregular.      Heart sounds: Normal heart sounds.   Pulmonary:      Breath sounds: Normal breath sounds.   Abdominal:      General: There is no distension.      Palpations: Abdomen is soft.   Musculoskeletal:      Cervical back: Normal range of motion.      Right lower leg: No edema.      Left lower leg: No edema.   Skin:     Findings: No lesion or rash.   Neurological:      Mental Status: He is alert and oriented to person, place, and time. Mental status is at baseline.   Psychiatric:         Behavior: Behavior normal.         Consults During Admission  IP CONSULT TO CARDIOLOGY    DISCHARGE MEDICATIONS               Medication List      START taking these medications    apixaban 2.5 mg tablet  Commonly known as: ELIQUIS  Take 1 tablet (2.5 mg total) by mouth 2 (two) times a day.  Dose: 2.5 mg        CHANGE how you take these medications    metoprolol succinate XL 25 mg 24 hr tablet  Commonly known as: TOPROL-XL  Take 1 tablet (25 mg total) by mouth daily.  Dose: 25 mg  What changed:   · how much to take  · when to take this        CONTINUE taking these medications    valACYclovir 1 gram tablet  Commonly known as: VALTREX                  PROCEDURES / LABS / IMAGING      Operative Procedures  None    Pertinent Labs  Lab Results   Component Value Date    WBC 4.92 07/01/2023    HGB 12.3 (L) 07/01/2023    HCT 37.1 (L) 07/01/2023    MCV 96.1 07/01/2023     (L) 07/01/2023     Lab Results   Component Value Date    GLUCOSE 194 (H) 07/01/2023    CALCIUM 9.2 07/01/2023     07/01/2023    K 4.1 07/01/2023    CO2 25 07/01/2023     (H) 07/01/2023    BUN 35 (H) 07/01/2023    CREATININE 1.1 07/01/2023         No results found for:  COVID19    Pertinent Imaging  X-RAY CHEST 1 VIEW    Result Date: 7/1/2023  IMPRESSION: No active disease is seen in the chest.      OUTPATIENT  FOLLOW-UP / REFERRALS / PENDING TESTS        Outpatient Follow-Up Appointments  Encounter Information    This patient does not currently have any appointments scheduled.         Referrals  No orders of the defined types were placed in this encounter.      Test Results Pending at Discharge  Unresulted Labs (From admission, onward)    None          Important Issues to Address in Follow-Up  PCP f/u  Cardio f/u      DISCHARGE DISPOSITION AND DESTINATION      Disposition: Home   Destination:                              Code Status At Discharge: Prior    Physician Order for Life-Sustaining Treatment Document Status      No documents found

## 2023-07-03 ASSESSMENT — ENCOUNTER SYMPTOMS
CHEST TIGHTNESS: 1
LIGHT-HEADEDNESS: 1
FEVER: 0

## 2023-10-31 ENCOUNTER — APPOINTMENT (EMERGENCY)
Dept: RADIOLOGY | Facility: HOSPITAL | Age: 88
DRG: 689 | End: 2023-10-31
Payer: MEDICARE

## 2023-10-31 ENCOUNTER — HOSPITAL ENCOUNTER (INPATIENT)
Facility: HOSPITAL | Age: 88
LOS: 1 days | Discharge: HOME | DRG: 689 | End: 2023-11-02
Attending: EMERGENCY MEDICINE | Admitting: STUDENT IN AN ORGANIZED HEALTH CARE EDUCATION/TRAINING PROGRAM
Payer: MEDICARE

## 2023-10-31 DIAGNOSIS — N39.0 LOWER URINARY TRACT INFECTIOUS DISEASE: ICD-10-CM

## 2023-10-31 DIAGNOSIS — R65.10 SIRS (SYSTEMIC INFLAMMATORY RESPONSE SYNDROME) (CMS/HCC): Primary | ICD-10-CM

## 2023-10-31 DIAGNOSIS — I48.91 NEW ONSET ATRIAL FIBRILLATION (CMS/HCC): ICD-10-CM

## 2023-10-31 DIAGNOSIS — R41.0 CONFUSION: ICD-10-CM

## 2023-10-31 PROBLEM — R73.9 HYPERGLYCEMIA: Status: ACTIVE | Noted: 2023-10-31

## 2023-10-31 PROBLEM — I48.0 PAROXYSMAL ATRIAL FIBRILLATION (CMS/HCC): Status: ACTIVE | Noted: 2023-07-01

## 2023-10-31 LAB
ALBUMIN SERPL-MCNC: 3.5 G/DL (ref 3.5–5.7)
ALP SERPL-CCNC: 113 IU/L (ref 34–125)
ALT SERPL-CCNC: 32 IU/L (ref 7–52)
ANION GAP SERPL CALC-SCNC: 9 MEQ/L (ref 3–15)
AST SERPL-CCNC: 26 IU/L (ref 13–39)
BACTERIA URNS QL MICRO: 2 /HPF
BASOPHILS # BLD: 0.01 K/UL (ref 0.01–0.1)
BASOPHILS NFR BLD: 0.3 %
BILIRUB SERPL-MCNC: 0.6 MG/DL (ref 0.3–1.2)
BILIRUB UR QL STRIP.AUTO: NEGATIVE MG/DL
BUN SERPL-MCNC: 34 MG/DL (ref 7–25)
CALCIUM SERPL-MCNC: 9.1 MG/DL (ref 8.6–10.3)
CHLORIDE SERPL-SCNC: 106 MEQ/L (ref 98–107)
CLARITY UR REFRACT.AUTO: ABNORMAL
CO2 SERPL-SCNC: 22 MEQ/L (ref 21–31)
COLOR UR AUTO: YELLOW
CREAT SERPL-MCNC: 1 MG/DL (ref 0.7–1.3)
DIFFERENTIAL METHOD BLD: ABNORMAL
EOSINOPHIL # BLD: 0.02 K/UL (ref 0.04–0.54)
EOSINOPHIL NFR BLD: 0.7 %
ERYTHROCYTE [DISTWIDTH] IN BLOOD BY AUTOMATED COUNT: 12.7 % (ref 11.6–14.4)
GFR SERPL CREATININE-BSD FRML MDRD: >60 ML/MIN/1.73M*2
GLUCOSE SERPL-MCNC: 202 MG/DL (ref 70–99)
GLUCOSE UR STRIP.AUTO-MCNC: NEGATIVE MG/DL
HCT VFR BLDCO AUTO: 34.1 % (ref 40.1–51)
HGB BLD-MCNC: 11.4 G/DL (ref 13.7–17.5)
HGB UR QL STRIP.AUTO: 2
HYALINE CASTS #/AREA URNS LPF: ABNORMAL /LPF
IMM GRANULOCYTES # BLD AUTO: 0.01 K/UL (ref 0–0.08)
IMM GRANULOCYTES NFR BLD AUTO: 0.3 %
KETONES UR STRIP.AUTO-MCNC: ABNORMAL MG/DL
LACTATE SERPL-SCNC: 1.7 MMOL/L (ref 0.4–2)
LEUKOCYTE ESTERASE UR QL STRIP.AUTO: 3
LYMPHOCYTES # BLD: 0.14 K/UL (ref 1.2–3.5)
LYMPHOCYTES NFR BLD: 4.6 %
MCH RBC QN AUTO: 32.2 PG (ref 28–33.2)
MCHC RBC AUTO-ENTMCNC: 33.4 G/DL (ref 32.2–36.5)
MCV RBC AUTO: 96.3 FL (ref 83–98)
MONOCYTES # BLD: 0.08 K/UL (ref 0.3–1)
MONOCYTES NFR BLD: 2.6 %
MUCOUS THREADS URNS QL MICRO: ABNORMAL /LPF
NEUTROPHILS # BLD: 2.77 K/UL (ref 1.7–7)
NEUTS SEG NFR BLD: 91.5 %
NITRITE UR QL STRIP.AUTO: POSITIVE
NRBC BLD-RTO: 0 %
PDW BLD AUTO: 10.7 FL (ref 9.4–12.4)
PH UR STRIP.AUTO: 6 [PH]
PLAT MORPH BLD: NORMAL
PLATELET # BLD AUTO: 108 K/UL (ref 150–350)
PLATELET # BLD EST: ABNORMAL 10*3/UL
POTASSIUM SERPL-SCNC: 4.7 MEQ/L (ref 3.5–5.1)
PROT SERPL-MCNC: 6.4 G/DL (ref 6–8.2)
PROT UR QL STRIP.AUTO: 1
RBC # BLD AUTO: 3.54 M/UL (ref 4.5–5.8)
RBC #/AREA URNS HPF: ABNORMAL /HPF
RBC MORPH BLD: NORMAL
SODIUM SERPL-SCNC: 137 MEQ/L (ref 136–145)
SP GR UR REFRACT.AUTO: 1.02
SQUAMOUS URNS QL MICRO: ABNORMAL /HPF
UROBILINOGEN UR STRIP-ACNC: 0.2 EU/DL
WBC # BLD AUTO: 3.03 K/UL (ref 3.8–10.5)
WBC #/AREA URNS HPF: ABNORMAL /HPF

## 2023-10-31 PROCEDURE — 63700000 HC SELF-ADMINISTRABLE DRUG: Performed by: INTERNAL MEDICINE

## 2023-10-31 PROCEDURE — 81001 URINALYSIS AUTO W/SCOPE: CPT | Performed by: PHYSICIAN ASSISTANT

## 2023-10-31 PROCEDURE — 96365 THER/PROPH/DIAG IV INF INIT: CPT | Mod: 59

## 2023-10-31 PROCEDURE — 80053 COMPREHEN METABOLIC PANEL: CPT | Performed by: PHYSICIAN ASSISTANT

## 2023-10-31 PROCEDURE — 99285 EMERGENCY DEPT VISIT HI MDM: CPT | Mod: 25

## 2023-10-31 PROCEDURE — 83605 ASSAY OF LACTIC ACID: CPT | Performed by: PHYSICIAN ASSISTANT

## 2023-10-31 PROCEDURE — 96366 THER/PROPH/DIAG IV INF ADDON: CPT | Performed by: INTERNAL MEDICINE

## 2023-10-31 PROCEDURE — 96361 HYDRATE IV INFUSION ADD-ON: CPT

## 2023-10-31 PROCEDURE — 36415 COLL VENOUS BLD VENIPUNCTURE: CPT | Performed by: PHYSICIAN ASSISTANT

## 2023-10-31 PROCEDURE — G0378 HOSPITAL OBSERVATION PER HR: HCPCS

## 2023-10-31 PROCEDURE — 25800000 HC PHARMACY IV SOLUTIONS: Performed by: INTERNAL MEDICINE

## 2023-10-31 PROCEDURE — 93005 ELECTROCARDIOGRAM TRACING: CPT | Performed by: EMERGENCY MEDICINE

## 2023-10-31 PROCEDURE — 99222 1ST HOSP IP/OBS MODERATE 55: CPT | Performed by: INTERNAL MEDICINE

## 2023-10-31 PROCEDURE — 96361 HYDRATE IV INFUSION ADD-ON: CPT | Performed by: INTERNAL MEDICINE

## 2023-10-31 PROCEDURE — 25800000 HC PHARMACY IV SOLUTIONS: Performed by: PHYSICIAN ASSISTANT

## 2023-10-31 PROCEDURE — 1123F ACP DISCUSS/DSCN MKR DOCD: CPT | Performed by: INTERNAL MEDICINE

## 2023-10-31 PROCEDURE — 63600000 HC DRUGS/DETAIL CODE: Mod: JZ | Performed by: PHYSICIAN ASSISTANT

## 2023-10-31 PROCEDURE — 71045 X-RAY EXAM CHEST 1 VIEW: CPT

## 2023-10-31 PROCEDURE — 87040 BLOOD CULTURE FOR BACTERIA: CPT | Mod: 91 | Performed by: PHYSICIAN ASSISTANT

## 2023-10-31 PROCEDURE — 87077 CULTURE AEROBIC IDENTIFY: CPT | Performed by: PHYSICIAN ASSISTANT

## 2023-10-31 PROCEDURE — 85025 COMPLETE CBC W/AUTO DIFF WBC: CPT | Performed by: PHYSICIAN ASSISTANT

## 2023-10-31 PROCEDURE — 74177 CT ABD & PELVIS W/CONTRAST: CPT | Mod: ME

## 2023-10-31 PROCEDURE — 63700000 HC SELF-ADMINISTRABLE DRUG: Performed by: PHYSICIAN ASSISTANT

## 2023-10-31 PROCEDURE — 70450 CT HEAD/BRAIN W/O DYE: CPT | Mod: ME

## 2023-10-31 PROCEDURE — 96375 TX/PRO/DX INJ NEW DRUG ADDON: CPT | Mod: 59

## 2023-10-31 PROCEDURE — 63600105 HC IODINE BASED CONTRAST: Mod: JZ | Performed by: PHYSICIAN ASSISTANT

## 2023-10-31 RX ORDER — DEXTROSE 40 %
15-30 GEL (GRAM) ORAL AS NEEDED
Status: DISCONTINUED | OUTPATIENT
Start: 2023-10-31 | End: 2023-11-02 | Stop reason: HOSPADM

## 2023-10-31 RX ORDER — ACETAMINOPHEN 325 MG/1
650 TABLET ORAL EVERY 6 HOURS PRN
Status: DISCONTINUED | OUTPATIENT
Start: 2023-10-31 | End: 2023-11-02 | Stop reason: HOSPADM

## 2023-10-31 RX ORDER — ONDANSETRON HYDROCHLORIDE 2 MG/ML
4 INJECTION, SOLUTION INTRAVENOUS ONCE
Status: COMPLETED | OUTPATIENT
Start: 2023-10-31 | End: 2023-10-31

## 2023-10-31 RX ORDER — MORPHINE SULFATE 4 MG/ML
4 INJECTION, SOLUTION INTRAMUSCULAR; INTRAVENOUS ONCE
Status: COMPLETED | OUTPATIENT
Start: 2023-10-31 | End: 2023-10-31

## 2023-10-31 RX ORDER — METOPROLOL SUCCINATE 25 MG/1
25 TABLET, EXTENDED RELEASE ORAL DAILY
Status: DISCONTINUED | OUTPATIENT
Start: 2023-10-31 | End: 2023-10-31

## 2023-10-31 RX ORDER — SODIUM CHLORIDE 9 MG/ML
INJECTION, SOLUTION INTRAVENOUS CONTINUOUS
Status: DISCONTINUED | OUTPATIENT
Start: 2023-10-31 | End: 2023-11-01

## 2023-10-31 RX ORDER — VALACYCLOVIR HYDROCHLORIDE 1 G/1
500 TABLET, FILM COATED ORAL DAILY
Status: DISCONTINUED | OUTPATIENT
Start: 2023-11-01 | End: 2023-11-02 | Stop reason: HOSPADM

## 2023-10-31 RX ORDER — NITROGLYCERIN 0.4 MG/1
0.4 TABLET SUBLINGUAL EVERY 5 MIN PRN
Status: DISCONTINUED | OUTPATIENT
Start: 2023-10-31 | End: 2023-11-02 | Stop reason: HOSPADM

## 2023-10-31 RX ORDER — METOPROLOL SUCCINATE 25 MG/1
25 TABLET, EXTENDED RELEASE ORAL EVERY MORNING
COMMUNITY

## 2023-10-31 RX ORDER — DEXTROSE 50 % IN WATER (D50W) INTRAVENOUS SYRINGE
25 AS NEEDED
Status: DISCONTINUED | OUTPATIENT
Start: 2023-10-31 | End: 2023-11-02 | Stop reason: HOSPADM

## 2023-10-31 RX ORDER — PHENAZOPYRIDINE HYDROCHLORIDE 95 MG/1
190 TABLET ORAL ONCE
Status: COMPLETED | OUTPATIENT
Start: 2023-10-31 | End: 2023-10-31

## 2023-10-31 RX ORDER — POTASSIUM CHLORIDE 750 MG/1
20 TABLET, EXTENDED RELEASE ORAL AS NEEDED
Status: DISCONTINUED | OUTPATIENT
Start: 2023-10-31 | End: 2023-11-02 | Stop reason: HOSPADM

## 2023-10-31 RX ORDER — POTASSIUM CHLORIDE 750 MG/1
40 TABLET, EXTENDED RELEASE ORAL AS NEEDED
Status: DISCONTINUED | OUTPATIENT
Start: 2023-10-31 | End: 2023-11-02 | Stop reason: HOSPADM

## 2023-10-31 RX ORDER — DOCUSATE SODIUM 100 MG/1
200 CAPSULE, LIQUID FILLED ORAL DAILY
Status: DISCONTINUED | OUTPATIENT
Start: 2023-10-31 | End: 2023-11-02 | Stop reason: HOSPADM

## 2023-10-31 RX ORDER — IBUPROFEN 200 MG
16-32 TABLET ORAL AS NEEDED
Status: DISCONTINUED | OUTPATIENT
Start: 2023-10-31 | End: 2023-11-02 | Stop reason: HOSPADM

## 2023-10-31 RX ORDER — MORPHINE SULFATE 2 MG/ML
2 INJECTION, SOLUTION INTRAMUSCULAR; INTRAVENOUS EVERY 4 HOURS PRN
Status: DISCONTINUED | OUTPATIENT
Start: 2023-10-31 | End: 2023-11-02 | Stop reason: HOSPADM

## 2023-10-31 RX ORDER — METOPROLOL SUCCINATE 25 MG/1
25 TABLET, EXTENDED RELEASE ORAL EVERY 12 HOURS
Status: DISCONTINUED | OUTPATIENT
Start: 2023-10-31 | End: 2023-11-02 | Stop reason: HOSPADM

## 2023-10-31 RX ORDER — ONDANSETRON HYDROCHLORIDE 2 MG/ML
4 INJECTION, SOLUTION INTRAVENOUS EVERY 6 HOURS PRN
Status: DISCONTINUED | OUTPATIENT
Start: 2023-10-31 | End: 2023-11-02 | Stop reason: HOSPADM

## 2023-10-31 RX ORDER — ACETAMINOPHEN 325 MG/1
650 TABLET ORAL ONCE
Status: COMPLETED | OUTPATIENT
Start: 2023-10-31 | End: 2023-10-31

## 2023-10-31 RX ADMIN — ONDANSETRON 4 MG: 2 INJECTION INTRAMUSCULAR; INTRAVENOUS at 12:11

## 2023-10-31 RX ADMIN — METOPROLOL SUCCINATE 25 MG: 25 TABLET, EXTENDED RELEASE ORAL at 20:47

## 2023-10-31 RX ADMIN — ACETAMINOPHEN 650 MG: 325 TABLET ORAL at 11:35

## 2023-10-31 RX ADMIN — IOHEXOL 100 ML: 350 INJECTION, SOLUTION INTRAVENOUS at 12:34

## 2023-10-31 RX ADMIN — SODIUM CHLORIDE 1000 ML: 9 INJECTION, SOLUTION INTRAVENOUS at 11:58

## 2023-10-31 RX ADMIN — Medication 190 MG: at 14:44

## 2023-10-31 RX ADMIN — SODIUM CHLORIDE: 9 INJECTION, SOLUTION INTRAVENOUS at 17:37

## 2023-10-31 RX ADMIN — MORPHINE SULFATE 4 MG: 4 INJECTION, SOLUTION INTRAMUSCULAR; INTRAVENOUS at 11:58

## 2023-10-31 RX ADMIN — APIXABAN 5 MG: 5 TABLET, FILM COATED ORAL at 20:47

## 2023-10-31 RX ADMIN — CEFTRIAXONE SODIUM 1 G: 1 INJECTION, POWDER, FOR SOLUTION INTRAMUSCULAR; INTRAVENOUS at 14:44

## 2023-10-31 ASSESSMENT — ENCOUNTER SYMPTOMS
SHORTNESS OF BREATH: 0
LIGHT-HEADEDNESS: 0
PALPITATIONS: 0
CHILLS: 0
HEADACHES: 0
FEVER: 0
BACK PAIN: 0
FLANK PAIN: 0
NECK PAIN: 0
VOMITING: 0
FATIGUE: 1
COUGH: 0
DIARRHEA: 0
DYSURIA: 1
ABDOMINAL PAIN: 1
DIZZINESS: 0
DIFFICULTY URINATING: 1
NAUSEA: 0

## 2023-10-31 NOTE — ASSESSMENT & PLAN NOTE
Glucose mildly elevated   Recent A1C 7.5%  Not interested in treatment per PCP  Continue SSI in the hospital   Diet control

## 2023-10-31 NOTE — CONSULTS
Subjective     Jose Fuentes is a 89 y.o. male who was admitted for UTI (urinary tract infection) [N39.0]. Patient was referred for AUS deactivation. Patient is a 89 y.o. male with a past medical history of HTN, CAD, paroxysmal afib (anticoagulated on eliquis), and prostate CA s/p prostatectomy presents to the ED associated with an lower abdominal pain.  Generalized weakness and fall. Symptoms started 7 days ago progressively getting worse. Patient is 7 weeks post-op from artificial urinary sphincter surgery performed by Dr. Klein at [facility].  Sphincter activated last week. He now has follow up with Dr. [place].     Pertinent lab results reviewed..    Medical History:   Past Medical History:   Diagnosis Date    CAD (coronary artery disease)     Hx of ventricular tachycardia     Prostate cancer (CMS/HCC)        Surgical History: History reviewed. No pertinent surgical history.    Social History:   Social History     Social History Narrative    Not on file       Family History: History reviewed. No pertinent family history.    Allergies: Sulfa (sulfonamide antibiotics), Ace inhibitors, and Niacin    Current Facility-Administered Medications   Medication Dose Route Frequency Provider Last Rate Last Admin    acetaminophen (TYLENOL) tablet 650 mg  650 mg oral q6h PRN Tucker Carlin MD        apixaban (ELIQUIS) tablet 5 mg  5 mg oral BID Tucker Carlin MD        [START ON 11/1/2023] cefTRIAXone (ROCEPHIN) IVPB 1 g in 100 mL NSS vial in bag  1 g intravenous q24h INT Tucker Carlin MD        glucose chewable tablet 16-32 g of dextrose  16-32 g of dextrose oral PRN Tucker Carlin MD        Or    dextrose 40 % oral gel 15-30 g of dextrose  15-30 g of dextrose oral PRN Tucker Carlin MD        Or    glucagon (GLUCAGEN) injection 1 mg  1 mg intramuscular PRN Tucker Carlin MD        Or    dextrose 50 % in water (D50) injection 12.5 g  25 mL intravenous PRTucker Lanza MD         docusate sodium (COLACE) capsule 200 mg  200 mg oral Daily Tucker Carlin MD        magnesium sulfate IVPB 1g in 100 mL NSS/D5W/SWFI  1 g intravenous PRN Tucker Carlin MD        Or    magnesium sulfate IVPB 2g in 50 mL NSS/D5W/SWFI  2 g intravenous PRN Tucker Carlin MD        metoprolol succinate XL (TOPROL-XL) 24 hr ER tablet 25 mg  25 mg oral q12h FOSTER Tucker Carlin MD        morphine injection 2 mg  2 mg intravenous q4h PRN Tucker Carlin MD        nitroglycerin (NITROSTAT) SL tablet 0.4 mg  0.4 mg sublingual q5 min PRN Tucker Carlin MD        ondansetron (ZOFRAN) injection 4 mg  4 mg intravenous q6h PRN Tucker Carlin MD        potassium chloride (KLOR-CON M) ER tablet (particles/crystals) 20 mEq  20 mEq oral PRN Tucker Carlin MD        potassium chloride (KLOR-CON M) ER tablet (particles/crystals) 40 mEq  40 mEq oral PRN Tucker Carlin MD        sodium chloride 0.9 % infusion   intravenous Continuous Tucker Carlin MD        [START ON 11/1/2023] valACYclovir (VALTREX) tablet 500 mg  500 mg oral Daily Tucker Carlin MD         Current Outpatient Medications   Medication Sig Dispense Refill    apixaban (ELIQUIS) 2.5 mg tablet Take 1 tablet (2.5 mg total) by mouth 2 (two) times a day. 60 tablet 0    metoprolol succinate XL (TOPROL-XL) 25 mg 24 hr tablet Take 1 tablet (25 mg total) by mouth daily. 30 tablet 0    valACYclovir 1 gram tablet           Medication List      ASK your doctor about these medications    apixaban 2.5 mg tablet  Commonly known as: ELIQUIS  Take 1 tablet (2.5 mg total) by mouth 2 (two) times a day.  Dose: 2.5 mg     metoprolol succinate XL 25 mg 24 hr tablet  Commonly known as: TOPROL-XL  Take 1 tablet (25 mg total) by mouth daily.  Dose: 25 mg     valACYclovir 1 gram tablet  Commonly known as: VALTREX          Review of Systems  Pertinent items are noted in HPI.    Objective   Labs  WBC 3, normal creatinine, UA suspicious for UTI    Imaging  CT scan of  the abdomen pelvis without evidence of hydronephrosis though with evidence of bilateral pelviectasis.       Physicial Exam  GENERAL: NAD; Patient is well nourished and appears appropriate for age  HEENT: Normocephalic and atraumatic; extraocular muscles grossly intact; neck supple with no lymphadenopathy; Mucous membranes are normal  CARDIOVASCULAR: No JVD; limbs appear well perfused  CHEST: No tachypnea; no dyspnea; no accessory muscle use; no auditory wheezes  ABDOMEN: S/NT/ND; no masses; no rebound; no guarding; no CVA TTP.  Well-healed lower abdominal incision  GENITAL: Normal for age; penis and scrotum normal; testicles bilaterally descended.  AUS pump within the scrotum  EXTREMITES: No clubbing, cyanosis or edema  NEURO: No focal defecits  SKIN: Skin is warm and dry; no rashes  PSYCH: Pleasant; cooperative; normal mood and affect. His speech is normal and behavior is normal.     Assessment   89 y.o. male s/p prostatectomy s/p AUS placement 7 weeks ago, activated 7 days ago now with UTI      AUS deactivated at bedside. Urine flow confirmed.        Plan     - monitor voids  - bladder scan PRN  - Recommend condom cath use  - await culture results  - continue abx  - eventual outpatient follow up with Dr. Miroslava Lopez PA-C  [facility]

## 2023-10-31 NOTE — H&P
Inpatient History & Physical          Hospital Medicine Service -  History & Physical        CHIEF COMPLAINT     Chief Complaint   Patient presents with    Altered Mental Status        HISTORY OF PRESENT ILLNESS      89 y.o. male with a past medical history of HTN, CAD, paroxysmal afib (anticoagulated on eliquis), and prostate CA s/p prostatectomy presents to the ED associated with an lower abdominal pain.  Generalized weakness and fall   Symptoms started 7 days ago progressively getting worse. Patient is 7 weeks post-op from artificial urinary sphincter surgery performed by Dr. Klein at [facility].  Sphincter activated last week.    Patient Now presenting due to lower abdominal pain and difficulty urinating.  States symptoms began last night.  Denies fever/chills.  Denies chest pain, dyspnea, nausea, vomiting, diarrhea, constipation, or flank pain.    Evidence of urinary tract infection.  Started on IV Rocephin.  CT abdomen pelvis no significant acute pathology noted.  Received 1 L of IV fluid in ED.    Case discussed with the ER team.       PAST MEDICAL AND SURGICAL HISTORY      Past Medical History:   Diagnosis Date    CAD (coronary artery disease)     Hx of ventricular tachycardia     Prostate cancer (CMS/HCC)        History reviewed. No pertinent surgical history.    MEDICATIONS      Prior to Admission medications    Medication Sig Start Date End Date Taking? Authorizing Provider   apixaban (ELIQUIS) 2.5 mg tablet Take 1 tablet (2.5 mg total) by mouth 2 (two) times a day. 7/1/23 7/31/23  Nazario Kasper MD   metoprolol succinate XL (TOPROL-XL) 25 mg 24 hr tablet Take 1 tablet (25 mg total) by mouth daily. 7/1/23 7/31/23  Nazario Kasper MD   valACYclovir 1 gram tablet  1/4/22   ProviderPaula MD       ALLERGIES      Sulfa (sulfonamide antibiotics), Ace inhibitors, and Niacin    FAMILY HISTORY      History reviewed. No pertinent family history.    SOCIAL HISTORY      Social  History     Socioeconomic History    Marital status:      Spouse name: None    Number of children: None    Years of education: None    Highest education level: None   Tobacco Use    Smoking status: Never    Smokeless tobacco: Never   Vaping Use    Vaping Use: Never used   Substance and Sexual Activity    Alcohol use: Yes     Comment: socially    Drug use: Never    Sexual activity: Defer     Social Determinants of Health     Food Insecurity: No Food Insecurity (10/31/2023)    Hunger Vital Sign     Worried About Running Out of Food in the Last Year: Never true     Ran Out of Food in the Last Year: Never true   Transportation Needs: No Transportation Needs (10/31/2023)    PRAPARE - Transportation     Lack of Transportation (Medical): No     Lack of Transportation (Non-Medical): No       REVIEW OF SYSTEMS      As per HPI, otherwise comprehensive review of systems negative apart from pertinent positive discussed above.    PHYSICAL EXAMINATION      Temp:  [36.1 °C (96.9 °F)-38 °C (100.4 °F)] 36.1 °C (96.9 °F)  Heart Rate:  [] 70  Resp:  [13-22] 20  BP: (153-197)/(67-83) 175/83  Body mass index is 25.98 kg/m².    General exam : appears age stated, frail appearing.  Head: atraumatic, normocephalic  Eyes : PERRLA, EOMI, no pallor, no icterus  ENT: no lesions, oropharynx pink, mucous membranes moist   Neck: supple, no Lymph nodes, no Thyromegaly, no JVD   CVS : normal rate, normal rhythm, S1 and S2 heard, no murmurs, rubs or gallops  Resp:normal accessory muscle usage, clear to auscultation Bilaterally  Abdomen : soft, Nt, BS +, no organomegaly   Extremities : no edema, no cyanosis   MSK: no DJD, no joint swellings, no joint tenderness   Skin: intact, warm, no rash  Neuro: AAO x3, CN 2-12 intact,  motor strength in all extremities, sensations, global weakness.  Psych: normal mood.cooperative      LABS / IMAGING / EKG        Labs  CBC Results       10/31/23 07/01/23 01/05/22     1113 0044 1242    WBC  3.03 4.92 3.64    RBC 3.54 3.86 4.21    HGB 11.4 12.3 13.8    HCT 34.1 37.1 41.3    MCV 96.3 96.1 98.1    MCH 32.2 31.9 32.8    MCHC 33.4 33.2 33.4     146 130         Comment for PLT at 1242 on 01/05/22: ALL RESULTS HAVE BEEN RECHECKED. SPECIMEN QUALITY CHECKED        CMP Results       10/31/23 07/01/23 01/05/22     1113 0044 1242     139 137    K 4.7 4.1 4.3    Cl 106 108 104    CO2 22 25 26    Glucose 202 194 126    BUN 34 35 18    Creatinine 1.0 1.1 0.8    Calcium 9.1 9.2 8.9    Anion Gap 9 6 7    AST 26 15 24    ALT 32 19 27    Albumin 3.5 3.8 3.6    EGFR >60.0 >60.0 >60.0         Comment for K at 1113 on 10/31/23: Results obtained on plasma. Plasma Potassium values may be up to 0.4 mEQ/L less than serum values. The differences may be greater for patients with high platelet or white cell counts.    Comment for K at 0044 on 07/01/23: Results obtained on plasma. Plasma Potassium values may be up to 0.4 mEQ/L less than serum values. The differences may be greater for patients with high platelet or white cell counts.    Comment for K at 1242 on 01/05/22: Results obtained on plasma. Plasma Potassium values may be up to 0.4 mEQ/L less than serum values. The differences may be greater for patients with high platelet or white cell counts.          Troponin I Results       07/01/23 07/01/23 08/23/15     0311 0044 1841    Troponin I -- -- <0.02  A rise or fall of troponin with at least one abnormal value is consistent with myocardial injury or necrosis in the presence of appropriate clinical, ECG, and/or imaging abnormalities.      HS Troponin I 13.6 12.6 --        Microbiology Results     ** No results found for the last 720 hours. **        UA Results       10/31/23     1346    Color Yellow    Clarity Cloudy    Glucose Negative    Bilirubin Negative    Ketones Trace    Sp Grav 1.020    Blood +2    Ph 6.0    Protein +1    Urobilinogen 0.2    Nitrite Positive    Leuk Est +3    WBC Too Numerous To Count     RBC Too Numerous To Count    Bacteria +2         Comment for Ketones at 1346 on 10/31/23: Free sulfhydryl drugs such as Mesna, Capoten, and Acetylcysteine (Mucomyst) may cause false positive ketonuria.    Comment for Blood at 1346 on 10/31/23: The sensitivity of the occult blood test is equivalent to approximately 4 intact RBC/HPF.          Imaging  CT ABDOMEN PELVIS WITH IV CONTRAST   Final Result   IMPRESSION:   Borderline hepatosplenomegaly.      Colonic diverticulosis without evidence of colonic diverticulitis.      Otherwise unremarkable CT abdomen and pelvis study.            CT HEAD WITHOUT IV CONTRAST   Final Result   IMPRESSION:   No acute intracranial abnormality.         X-RAY CHEST 1 VIEW   Final Result   IMPRESSION:   No acute cardiopulmonary process.            ECG 12 lead    (Results Pending)         ECG/Telemetry  reviewed by me    ASSESSMENT AND PLAN           * Complicated UTI (urinary tract infection)  Assessment & Plan  89-year-old male with 7 days of confusion and weakness and unwitnessed fall today.    -Also with Patient's been having 7 days of abdominal discomfort.    -Status post urinary bladder sphincter replacement surgery 7 weeks ago at [facility] by Dr. Garcia.    -Evidence of urinary tract infection.  We will follow urine and blood cultures.  -Continue IV Rocephin 1 g every 24 hours.  -Pain control and gentle IV hydration.  -Urology consult.      Hyperglycemia  Assessment & Plan  Blood sugar level 202.  Hemoglobin A1c ordered.  Diet controlled.    Prostate CA (CMS/HCC)  Assessment & Plan  History of prostate cancer diagnosed 3 years ago treated with radiation beam therapy.    HTN (hypertension)  Assessment & Plan  Continue metoprolol.  Continue to monitor.    Paroxysmal atrial fibrillation (CMS/HCC)  Assessment & Plan  Currently in normal sinus rhythm.  Continue metoprolol and Eliquis.  Cardiac status is stable.        VTE Assessment: Padua    VTE Prophylaxis Plan:    Code Status: Full Code       Tucker Carlin MD  10/31/2023

## 2023-10-31 NOTE — ED PROVIDER NOTES
Emergency Medicine Note  HPI   HISTORY OF PRESENT ILLNESS     89 year old M with pmhx of HTN, CAD, paroxysmal afib (anticoagulated on eliquis), and prostate CA s/p prostatectomy presents to the ED for evaluation.  Patient is 7 weeks post-op from artificial urinary sphincter surgery performed by Dr. Klein at [facility].  States the sphincter was turned on 7 days ago.  Now presenting due to lower abdominal pain and difficulty urinating.  States symptoms began last night.  Wife feels that the patient is mildly confused.  He is currently alert and oriented x3.  Denies fever/chills.  Denies chest pain, dyspnea, nausea, vomiting, diarrhea, constipation, or flank pain.             Patient History   PAST HISTORY     Reviewed from Nursing Triage:       Past Medical History:   Diagnosis Date    CAD (coronary artery disease)     Hx of ventricular tachycardia     Prostate cancer (CMS/HCC)        History reviewed. No pertinent surgical history.    History reviewed. No pertinent family history.    Social History     Tobacco Use    Smoking status: Never    Smokeless tobacco: Never   Vaping Use    Vaping Use: Never used   Substance Use Topics    Alcohol use: Yes     Comment: socially    Drug use: Never         Review of Systems   REVIEW OF SYSTEMS     Review of Systems   Constitutional: Positive for fatigue. Negative for chills and fever.   Respiratory: Negative for cough and shortness of breath.    Cardiovascular: Negative for chest pain and palpitations.   Gastrointestinal: Positive for abdominal pain. Negative for diarrhea, nausea and vomiting.   Genitourinary: Positive for difficulty urinating and dysuria. Negative for flank pain.   Musculoskeletal: Negative for back pain, gait problem and neck pain.   Neurological: Negative for dizziness, syncope, light-headedness and headaches.         VITALS     ED Vitals    Date/Time Temp Pulse Resp BP SpO2 [facility]   10/31/23 1730 36.1 °C (97 °F) 65 13 127/61 93 % MF    10/31/23 1701 -- 61 13 140/64 94 % [facility]   10/31/23 1618 -- -- -- 175/83 -- [facility]   10/31/23 1600 -- 70 20 183/83 93 % [facility]   10/31/23 1530 -- 81 20 183/78 96 % [facility]   10/31/23 1430 -- 76 20 174/79 96 % [facility]   10/31/23 1400 -- 77 13 153/67 95 %    10/31/23 1300 36.1 °C (96.9 °F) 80 20 159/70 91 %    10/31/23 1200 -- 93 20 175/67 92 % [facility]   10/31/23 1130 -- 97 22 182/74 92 % [facility]   10/31/23 1100 38 °C (100.4 °F) 109 22 197/82 92 % [facility]                       Physical Exam   PHYSICAL EXAM     Physical Exam  Vitals and nursing note reviewed.   Constitutional:       General: He is not in acute distress.     Appearance: He is well-developed and normal weight. He is ill-appearing.   HENT:      Head: Normocephalic and atraumatic.   Eyes:      Conjunctiva/sclera: Conjunctivae normal.   Cardiovascular:      Rate and Rhythm: Regular rhythm. Tachycardia present.   Pulmonary:      Effort: Pulmonary effort is normal.      Breath sounds: Normal breath sounds.   Abdominal:      General: Abdomen is flat. Bowel sounds are normal. There is no distension.      Palpations: Abdomen is soft. There is no mass.      Tenderness: There is abdominal tenderness (lower (mild)).   Musculoskeletal:         General: No tenderness or deformity. Normal range of motion.      Cervical back: Normal range of motion and neck supple.   Skin:     General: Skin is warm and dry.   Neurological:      General: No focal deficit present.      Mental Status: He is alert and oriented to person, place, and time.      Comments: Oriented x3 but confused some details regarding situation   Psychiatric:         Mood and Affect: Mood normal.         Behavior: Behavior normal.           PROCEDURES     Procedures     DATA     Results     Procedure Component Value Units Date/Time    UA with reflex culture [[id number]]  (Abnormal) Collected: 10/31/23 1346    Specimen: Urine, Clean Catch Updated: 10/31/23 1423    Narrative:      The following orders were created for panel order UA  with reflex culture.  Procedure                               Abnormality         Status                     ---------                               -----------         ------                     UA Reflex to Culture (Ma...[[id number]]  Abnormal            Final result               UA Microscopic[[id number]]               Abnormal            Final result                 Please view results for these tests on the individual orders.    UA Microscopic [[id number]]  (Abnormal) Collected: 10/31/23 1346    Specimen: Urine, Clean Catch Updated: 10/31/23 1423     RBC, Urine Too Numerous To Count /HPF      WBC, Urine Too Numerous To Count /HPF      Squamous Epithelial Rare /hpf      Hyaline Cast None Seen /lpf      Bacteria, Urine +2 /HPF      Mucus Rare /LPF     UA Reflex to Culture (Macroscopic) [[id number]]  (Abnormal) Collected: 10/31/23 1346    Specimen: Urine, Clean Catch Updated: 10/31/23 1409     Color, Urine Yellow     Clarity, Urine Cloudy     Specific Gravity, Urine 1.020     pH, Urine 6.0     Leukocyte Esterase +3     Nitrite, Urine Positive     Protein, Urine +1     Glucose, Urine Negative mg/dL      Ketones, Urine Trace mg/dL      Comment: Free sulfhydryl drugs such as Mesna, Capoten, and Acetylcysteine (Mucomyst) may cause false positive ketonuria.        Urobilinogen, Urine 0.2 EU/dL      Bilirubin, Urine Negative mg/dL      Blood, Urine +2     Comment: The sensitivity of the occult blood test is equivalent to approximately 4 intact RBC/HPF.       CBC and differential [[id number]]  (Abnormal) Collected: 10/31/23 1113    Specimen: Blood, Venous Updated: 10/31/23 1226     WBC 3.03 K/uL      RBC 3.54 M/uL      Hemoglobin 11.4 g/dL      Hematocrit 34.1 %      MCV 96.3 fL      MCH 32.2 pg      MCHC 33.4 g/dL      RDW 12.7 %      Platelets 108 K/uL      MPV 10.7 fL      Differential Type Auto     nRBC 0.0 %      Immature Granulocytes 0.3 %      Neutrophils 91.5 %      Lymphocytes 4.6 %      Monocytes 2.6 %       Eosinophils 0.7 %      Basophils 0.3 %      Immature Granulocytes, Absolute 0.01 K/uL      Neutrophils, Absolute 2.77 K/uL      Lymphocytes, Absolute 0.14 K/uL      Monocytes, Absolute 0.08 K/uL      Eosinophils, Absolute 0.02 K/uL      Basophils, Absolute 0.01 K/uL      RBC Morphology Normal     PLT Morphology Normal     Platelet Estimate Decreased (51,000-149,000)    Comprehensive metabolic panel [[id number]]  (Abnormal) Collected: 10/31/23 1113    Specimen: Blood, Venous Updated: 10/31/23 1154     Sodium 137 mEQ/L      Potassium 4.7 mEQ/L      Comment: Results obtained on plasma. Plasma Potassium values may be up to 0.4 mEQ/L less than serum values. The differences may be greater for patients with high platelet or white cell counts.        Chloride 106 mEQ/L      CO2 22 mEQ/L      BUN 34 mg/dL      Creatinine 1.0 mg/dL      Glucose 202 mg/dL      Calcium 9.1 mg/dL      AST (SGOT) 26 IU/L      ALT (SGPT) 32 IU/L      Alkaline Phosphatase 113 IU/L      Total Protein 6.4 g/dL      Comment: Test performed on plasma which typically contains approximately 0.4 g/dL more protein than serum.        Albumin 3.5 g/dL      Bilirubin, Total 0.6 mg/dL      eGFR >60.0 mL/min/1.73m*2      Anion Gap 9 mEQ/L     Lactate, w/ reflex repeat if > 2.0 [[id number]]  (Normal) Collected: 10/31/23 1113    Specimen: Blood, Venous Updated: 10/31/23 1138     Lactate 1.7 mmol/L     Blood Culture Blood, Venous [[id number]] Collected: 10/31/23 1122    Specimen: Blood, Venous Updated: 10/31/23 1133    Blood Culture Blood, Venous [[id number]] Collected: 10/31/23 1113    Specimen: Blood, Venous Updated: 10/31/23 1133    RAINBOW LT BLUE [[id number]] Collected: 10/31/23 1113    Specimen: Blood, Venous Updated: 10/31/23 1133    RAINBOW GOLD [[id number]] Collected: 10/31/23 1113    Specimen: Blood, Venous Updated: 10/31/23 1133    [place] Draw Panel [[id number]] Collected: 10/31/23 1113    Specimen: Blood, Venous Updated: 10/31/23 1133    Narrative:      The  following orders were created for panel order [place] Draw Panel.  Procedure                               Abnormality         Status                     ---------                               -----------         ------                     RAINBOW RED[[id number]]                                      In process                 RAINBOW LT BLUE[[id number]]                                  In process                 RAINBOW GOLD[[id number]]                                     In process                   Please view results for these tests on the individual orders.    RAINBOW RED [[id number]] Collected: 10/31/23 1113    Specimen: Blood, Venous Updated: 10/31/23 1133          Imaging Results          CT ABDOMEN PELVIS WITH IV CONTRAST (Final result)  Result time 10/31/23 12:48:04    Final result                 Impression:    IMPRESSION:  Borderline hepatosplenomegaly.    Colonic diverticulosis without evidence of colonic diverticulitis.    Otherwise unremarkable CT abdomen and pelvis study.                 Narrative:    CLINICAL HISTORY: LLQ abdominal pain  RLQ abdominal pain (Age >= 14y)  lower abd pain    COMMENT:  COMPARISON: None.    TECHNIQUE:  CT of the abdomen and pelvis was performed with intravenous  contrast. Delayed images were obtained through the abdomen. Coronal and sagittal  reconstructed images were obtained.    ADMINISTERED IV CONTRAST AND DOSE: 100mL of iohexoL (OMNIPAQUE 350) 350 mg  iodine/mL solution 100 mL    CT DOSE:  One or more dose reduction techniques (e.g. automated exposure  control, adjustment of the mA and/or kV according to patient size, use of  iterative reconstruction technique) utilized for this examination.    FINDINGS:    Lung bases: Heart size is normal.   No consolidation, pericardial or pleural  effusion. Bibasilar atelectasis is noted.    Hepatobiliary:  Liver: No focal hepatic mass. Liver is borderline enlarged, measures 18.5 cm  in craniocaudal dimension.  Gallbladder:  Cholelithiasis is noted.  Bile Ducts: No intrahepatic or extrahepatic biliary ductal dilatation    Spleen: Spleen is enlarged, measures 14.7 cm in AP dimension.    Pancreas: No focal mass or ductal dilatation.    Adrenal glands: Normal in appearance.    Kidneys: No hydronephrosis or solid lesions. Bilateral pelviectasis is noted.    GI Tract:  GE junction and stomach:  Unremarkable.  Small bowel:  Unremarkable.  Appendix:  Unremarkable.  Colon and rectum:  Colonic diverticulosis is noted.    Pelvic organs and bladder: A pump is noted in the right inguinal region.  Prostatectomy is noted.    Peritoneum/retroperitoneum: No free air or free fluid.    Lymph nodes: No lymphadenopathy.    Vessels: Calcified plaque seen in aorta and iliac arteries.    Bones and soft tissues:  Soft tissues: Unremarkable.  Bones: Multilevel degenerative changes of lumbar spine are noted.                               CT HEAD WITHOUT IV CONTRAST (Final result)  Result time 10/31/23 12:40:37    Final result                 Impression:    IMPRESSION:  No acute intracranial abnormality.               Narrative:    CLINICAL HISTORY: Mental status change, unknown cause  AMS    COMMENT:  COMPARISON: None.    TECHNIQUE: CT of the head was performed without intravenous contrast. Coronal  and sagittal reformations were obtained.    CT DOSE:  One or more dose reduction techniques (e.g. automated exposure  control, adjustment of the mA and/or kV according to patient size, use of  iterative reconstruction technique) utilized for this examination.    FINDINGS:    Prominent sulci and ventricles, consistent with generalized cerebral volume  loss.    Scattered periventricular and subcortical white matter hypodensities, likely  represent small vessel ischemic changes.    No hyperdense MCA sign is seen.    No intracranial hemorrhage or extra-axial collection is identified.    No mass effect or midline shift is present.    Mucous retention cyst seen in left  maxillary sinus.    Bones appear normal. Soft tissues are unremarkable.  Bilateral lens implants are  noted.                               X-RAY CHEST 1 VIEW (Final result)  Result time 10/31/23 12:35:34    Final result                 Impression:    IMPRESSION:  No acute cardiopulmonary process.                 Narrative:    CLINICAL HISTORY: ams    COMMENT:  COMPARISON: 7/1/2023.    TECHNIQUE: One view of the chest was obtained.    FINDINGS:    Lines/tubes/foreign bodies:  None.  Lungs:  The lungs are clear.  Pleura:  No right pleural effusion. No pneumothorax. Left costophrenic angle  is not well visualized.  Heart / mediastinum:  Cardiac and mediastinal silhouettes are within normal  limits.  Bones / soft tissues:  Unremarkable.                                  ECG 12 lead   Independent Interpretation by ED Provider   Rhythm: [Sinus tachycardia]  Rate: 106  P waves: [normal interval]  QRS: [normal QRS]  Axis: [normal]  ST Segments: [no obvious ST elevation or ischemia]          Scoring tools                                  ED Course & MDM   MDM / ED COURSE / CLINICAL IMPRESSION / DISPO     Medical Decision Making  This patient presents with altered mental status, concerning for UTI.  Patient also complaining of lower abdominal pain and difficulty urinating.  He is 7 weeks postop from urethral artificial sphincter surgery.  States that sphincter is turned on last week.      Labs and exam were inconsistent with toxic metabolic etiologies such as electrolyte disturbances (Na/Ca), hypoglycemia, and uremia; acidosis states. History and exam make toxidromes of intoxication or withdrawal, hypoxemia or hypercarbia, liver disease or failure causing hepatic encephalopathy, endocrine emergencies (hyper/hypothyroidism, adrenal insufficiency), seizure, trauma, intracranial bleeds or ischemic stroke less likely.    CT scan of the head normal.  CT scan of the abdomen/ pelvis unremarkable.  No findings concerning for cute  hepatobiliary disease (includng acute cholecystitis), acute pancreatitis, PUD (including perforation), acute appendicitis, vascular catastrophe, bowel obstruction or viscus perforation.    Confusion: acute illness or injury  Lower urinary tract infectious disease: acute illness or injury  SIRS (systemic inflammatory response syndrome) (CMS/HCC): acute illness or injury  Amount and/or Complexity of Data Reviewed  Labs: ordered. Decision-making details documented in ED Course.  Radiology: ordered. Decision-making details documented in ED Course.  ECG/medicine tests: ordered and independent interpretation performed.      Risk  OTC drugs.  Prescription drug management.  Decision regarding hospitalization.          ED Course as of 10/31/23 1816   Tue Oct 31, 2023   1127 Impression: lower abd pain   Plan: IV, CT head, CXR, CT a/p with IV contrast, labs, blood cx, lactate, UA, 1L NS bolus, IV morphine/zofran, re-assess   Bladder ultrasound with 33cc of urine  [CW]   1216 Lactate: 1.7 [CW]   1216 WBC(!): 3.03 [CW]   1238 X-RAY CHEST 1 VIEW  IMPRESSION: No acute cardiopulmonary process. [CW]   1247 CT HEAD WITHOUT IV CONTRAST  IMPRESSION: No acute intracranial abnormality. [CW]   1250 CT ABDOMEN PELVIS WITH IV CONTRAST  IMPRESSION: Borderline hepatosplenomegaly.     Colonic diverticulosis without evidence of colonic diverticulitis.     Otherwise unremarkable CT abdomen and pelvis study. [CW]   1407 CT with borderline hepatosplenomegaly and diverticulosis, no acute findings explain his abdominal pain.  Lab work overall stable.  UA pending. [CW]   1426 UA with infection -- IV rocephin ordered. PO pyridium ordered.  [CW]   1538 D/w Dr. Anderson: to come see [SB]      ED Course User Index  [CW] Kelin Tovar PA C  [SB] Koko Flower MD     Clinical Impression      SIRS (systemic inflammatory response syndrome) (CMS/HCC)   Confusion   Lower urinary tract infectious disease     _________________     ED Disposition    Admit / Observation                   Kelin Tovar PA C  10/31/23 1812       Kelin Tovar PA C  10/31/23 1816

## 2023-10-31 NOTE — PLAN OF CARE
Care Coordination Admission Assessment Note    General Information:  Readmission Within the last 30 days: no previous admission in last 30 days  Does patient have a : No  Patient-Specific Goals (include timeframe): did not state    Living Arrangements:  Arrived From: home  Current Living Arrangements: home  People in Home: spouse  Home Accessibility: stairs to enter home (Group), stairs within home (Group)  Living Arrangement Comments: Patient lives with wife in a 4 story home with 2 steps to enter    Housing Stability and Financial Resources (SDOH):  In the last 12 months, was there a time when you were not able to pay the mortgage or rent on time?:    In the last 12 months, how many places have you lived?:    In the last 12 months, was there a time when you did not have a steady place to sleep or slept in a shelter (including now)?:    How hard is it for you to pay for the very basics like food, housing, medical care, and heating?:      Functional Status Prior to Admission:   Assistive Device/Animal Currently Used at Home: none  Functional Status Comments: Patient independent with ADLS  IADL Comments:       Supports and Services:  Current Outpatient/Agency/Support Group: none  Type of Current Home Care Services:    History of home care episode or rehab stay: no hx of home care or SNF    Discharge Needs Assessment:   Concerns to be Addressed: discharge planning  Current Discharge Risk: cognitively impaired  Anticipated Changes Related to Illness:      Patient/Family Anticipated Discharge Plan:  Patient/Family Anticipates Transition To: home, home with family  Patient/Family Anticipated Services at Transition:      Connection to Community  Not applicable      Patient Choice:   Offered/Gave Vendor List: no  Patient's Choice of Community Agency(s):         Anticipated Discharge Plan:  Met with patient. Provided education and contact information for Care Coordination services.: yes  Anticipated  Discharge Disposition: home with assistance     Transportation Needs (SDOH):  Transportation Concerns:    Transportation Anticipated: family or friend will provide  Is Out of Hospital DNR needed at discharge?: no    In the past 12 months, has lack of transportation kept you from medical appointments or from getting medications?: No  In the past 12 months, has lack of transportation kept you from meetings, work, or from getting things needed for daily living?: No    Concerns - comments:       SW met with patient and wife in ED, wife able to confirm demographics, PCP & pharmacy. Patient lives with wife in 4 floor home with 2 steps to enter. Patient independent with ADLS, no assisted devices used in the home, no hx with home care or SNF

## 2023-10-31 NOTE — ED ATTESTATION NOTE
Procedures  Physical Exam  Review of Systems  [facility]    10/31/271734:52 AM  I have personally seen and examined the patient. I was involved in the care and medical decision making for this patient.    I reviewed and agree with the PA/NP/Resident's assessment and plan of care, with any exceptions as documented below.    My focused history, examination, assessment, and plan of care of Jose Fuentes is as follows:  The patient presents with 7 days of confusion and weakness.  Today the patient had an unwitnessed fall.  Patient's been having 7 days of abdominal discomfort.  His surgery was done at [facility] 7 weeks ago.  They state that his surgeon is no longer at that facility  Exam: Lower abdominal pain  Alert but pleasantly confused (no head trauma appreciated  Impression/Plan/Medical Decision Making: IV, labs, imaging of his brain and also his abdomen, urine, observe    Vital Signs Review: Vital signs have been reviewed. The oxygen saturation is  SpO2: 92 % which is slightly low.    I was physically present for the key/critical portions of the following procedures: None    This document was created using Dragon dictation software.  There might be some typographical errors due to this technology.    We are in a period of time with increased volumes and decreased capacity.      Koko Flower MD  10/31/23 2041

## 2023-10-31 NOTE — ASSESSMENT & PLAN NOTE
Presents with lower abdominal pain, weakness/ falls   Found to have UTI in setting of recent AUS placement and activation   UCx growing >100,000 GNR  BCx pending   Afebrile without leukocytosis   Continue IV CTX while awaiting cx  S/p AUS deactivation on 10/31/23  Trend bladder scans, straight catheterization as needed  Urology consult appreciated, f/u with Dr Colorado

## 2023-11-01 PROBLEM — R65.10 SIRS (SYSTEMIC INFLAMMATORY RESPONSE SYNDROME) (CMS/HCC): Status: ACTIVE | Noted: 2023-11-01

## 2023-11-01 PROBLEM — R33.9 URINARY RETENTION: Status: ACTIVE | Noted: 2023-11-01

## 2023-11-01 PROBLEM — E11.9 TYPE 2 DIABETES MELLITUS, WITHOUT LONG-TERM CURRENT USE OF INSULIN (CMS/HCC): Status: ACTIVE | Noted: 2023-10-31

## 2023-11-01 LAB
ANION GAP SERPL CALC-SCNC: 6 MEQ/L (ref 3–15)
BASOPHILS # BLD: 0.01 K/UL (ref 0.01–0.1)
BASOPHILS NFR BLD: 0.2 %
BUN SERPL-MCNC: 30 MG/DL (ref 7–25)
CALCIUM SERPL-MCNC: 8.5 MG/DL (ref 8.6–10.3)
CHLORIDE SERPL-SCNC: 110 MEQ/L (ref 98–107)
CO2 SERPL-SCNC: 23 MEQ/L (ref 21–31)
CREAT SERPL-MCNC: 0.9 MG/DL (ref 0.7–1.3)
DIFFERENTIAL METHOD BLD: ABNORMAL
EOSINOPHIL # BLD: 0.03 K/UL (ref 0.04–0.54)
EOSINOPHIL NFR BLD: 0.6 %
ERYTHROCYTE [DISTWIDTH] IN BLOOD BY AUTOMATED COUNT: 12.8 % (ref 11.6–14.4)
GFR SERPL CREATININE-BSD FRML MDRD: >60 ML/MIN/1.73M*2
GLUCOSE BLD-MCNC: 174 MG/DL (ref 70–99)
GLUCOSE BLD-MCNC: 227 MG/DL (ref 70–99)
GLUCOSE SERPL-MCNC: 133 MG/DL (ref 70–99)
HCT VFR BLDCO AUTO: 32.2 % (ref 40.1–51)
HGB BLD-MCNC: 10.6 G/DL (ref 13.7–17.5)
IMM GRANULOCYTES # BLD AUTO: 0.01 K/UL (ref 0–0.08)
IMM GRANULOCYTES NFR BLD AUTO: 0.2 %
LYMPHOCYTES # BLD: 0.48 K/UL (ref 1.2–3.5)
LYMPHOCYTES NFR BLD: 10.1 %
MAGNESIUM SERPL-MCNC: 2 MG/DL (ref 1.8–2.5)
MCH RBC QN AUTO: 31.6 PG (ref 28–33.2)
MCHC RBC AUTO-ENTMCNC: 32.9 G/DL (ref 32.2–36.5)
MCV RBC AUTO: 96.1 FL (ref 83–98)
MONOCYTES # BLD: 0.72 K/UL (ref 0.3–1)
MONOCYTES NFR BLD: 15.1 %
NEUTROPHILS # BLD: 3.51 K/UL (ref 1.7–7)
NEUTS SEG NFR BLD: 73.8 %
NRBC BLD-RTO: 0 %
PDW BLD AUTO: 10.9 FL (ref 9.4–12.4)
PLATELET # BLD AUTO: 113 K/UL (ref 150–350)
POCT TEST: ABNORMAL
POCT TEST: ABNORMAL
POTASSIUM SERPL-SCNC: 4.1 MEQ/L (ref 3.5–5.1)
RBC # BLD AUTO: 3.35 M/UL (ref 4.5–5.8)
SODIUM SERPL-SCNC: 139 MEQ/L (ref 136–145)
WBC # BLD AUTO: 4.76 K/UL (ref 3.8–10.5)

## 2023-11-01 PROCEDURE — 12000000 HC ROOM AND CARE MED/SURG

## 2023-11-01 PROCEDURE — G0378 HOSPITAL OBSERVATION PER HR: HCPCS

## 2023-11-01 PROCEDURE — 99232 SBSQ HOSP IP/OBS MODERATE 35: CPT | Mod: FS | Performed by: STUDENT IN AN ORGANIZED HEALTH CARE EDUCATION/TRAINING PROGRAM

## 2023-11-01 PROCEDURE — 63700000 HC SELF-ADMINISTRABLE DRUG: Performed by: INTERNAL MEDICINE

## 2023-11-01 PROCEDURE — 97166 OT EVAL MOD COMPLEX 45 MIN: CPT | Mod: GO

## 2023-11-01 PROCEDURE — 63600000 HC DRUGS/DETAIL CODE: Mod: JZ | Performed by: INTERNAL MEDICINE

## 2023-11-01 PROCEDURE — 85025 COMPLETE CBC W/AUTO DIFF WBC: CPT | Performed by: INTERNAL MEDICINE

## 2023-11-01 PROCEDURE — 63600000 HC DRUGS/DETAIL CODE: Performed by: NURSE PRACTITIONER

## 2023-11-01 PROCEDURE — 25800000 HC PHARMACY IV SOLUTIONS: Performed by: INTERNAL MEDICINE

## 2023-11-01 PROCEDURE — 80048 BASIC METABOLIC PNL TOTAL CA: CPT | Performed by: INTERNAL MEDICINE

## 2023-11-01 PROCEDURE — 36415 COLL VENOUS BLD VENIPUNCTURE: CPT | Performed by: INTERNAL MEDICINE

## 2023-11-01 PROCEDURE — 83735 ASSAY OF MAGNESIUM: CPT | Performed by: NURSE PRACTITIONER

## 2023-11-01 PROCEDURE — 97535 SELF CARE MNGMENT TRAINING: CPT | Mod: GO

## 2023-11-01 RX ORDER — SODIUM CHLORIDE 9 MG/ML
INJECTION, SOLUTION INTRAVENOUS CONTINUOUS
Status: DISCONTINUED | OUTPATIENT
Start: 2023-11-01 | End: 2023-11-01

## 2023-11-01 RX ORDER — INSULIN LISPRO 100 [IU]/ML
3-5 INJECTION, SOLUTION INTRAVENOUS; SUBCUTANEOUS
Status: DISCONTINUED | OUTPATIENT
Start: 2023-11-01 | End: 2023-11-02 | Stop reason: HOSPADM

## 2023-11-01 RX ADMIN — VALACYCLOVIR HYDROCHLORIDE 500 MG: 1 TABLET, FILM COATED ORAL at 08:59

## 2023-11-01 RX ADMIN — CEFTRIAXONE SODIUM 1 G: 1 INJECTION, POWDER, FOR SOLUTION INTRAMUSCULAR; INTRAVENOUS at 13:52

## 2023-11-01 RX ADMIN — ACETAMINOPHEN 650 MG: 325 TABLET ORAL at 08:58

## 2023-11-01 RX ADMIN — APIXABAN 5 MG: 5 TABLET, FILM COATED ORAL at 20:35

## 2023-11-01 RX ADMIN — METOPROLOL SUCCINATE 25 MG: 25 TABLET, EXTENDED RELEASE ORAL at 08:59

## 2023-11-01 RX ADMIN — METOPROLOL SUCCINATE 25 MG: 25 TABLET, EXTENDED RELEASE ORAL at 21:21

## 2023-11-01 RX ADMIN — APIXABAN 5 MG: 5 TABLET, FILM COATED ORAL at 08:59

## 2023-11-01 RX ADMIN — ACETAMINOPHEN 650 MG: 325 TABLET ORAL at 21:27

## 2023-11-01 ASSESSMENT — COGNITIVE AND FUNCTIONAL STATUS - GENERAL
TOILETING: 3 - A LITTLE
HELP NEEDED FOR PERSONAL GROOMING: 3 - A LITTLE
EATING MEALS: 4 - NONE
CLIMB 3 TO 5 STEPS WITH RAILING: 3 - A LITTLE
STANDING UP FROM CHAIR USING ARMS: 3 - A LITTLE
AFFECT: WFL
HELP NEEDED FOR BATHING: 3 - A LITTLE
DRESSING REGULAR LOWER BODY CLOTHING: 3 - A LITTLE
DRESSING REGULAR UPPER BODY CLOTHING: 3 - A LITTLE
MOVING TO AND FROM BED TO CHAIR: 3 - A LITTLE
WALKING IN HOSPITAL ROOM: 3 - A LITTLE

## 2023-11-01 NOTE — PROGRESS NOTES
Hospital Medicine Service -  Daily Progress Note       SUBJECTIVE   Interval History: Mild urinary retention this morning requiring straight cath. Now voiding spontaneously in condom cath. Main complain is leg cramping from bed positioning. Had some LH upon standing. No CP, SOB, abd pain.     OBJECTIVE      Vital signs in last 24 hours:  Temp:  [35.6 °C (96.1 °F)-36.8 °C (98.2 °F)] 36.4 °C (97.5 °F)  Heart Rate:  [59-81] 66  Resp:  [13-20] 18  BP: (111-183)/(59-84) 120/59    Intake/Output Summary (Last 24 hours) at 11/1/2023 1357  Last data filed at 11/1/2023 1327  Gross per 24 hour   Intake 250 ml   Output 1350 ml   Net -1100 ml       PHYSICAL EXAMINATION      Physical Exam  Vitals and nursing note reviewed.   Constitutional:       General: He is not in acute distress.  HENT:      Head: Normocephalic.   Eyes:      Pupils: Pupils are equal, round, and reactive to light.   Cardiovascular:      Rate and Rhythm: Normal rate and regular rhythm.   Pulmonary:      Effort: Pulmonary effort is normal.   Abdominal:      General: Abdomen is flat.      Palpations: Abdomen is soft.   Musculoskeletal:         General: No swelling. Normal range of motion.      Cervical back: Normal range of motion.   Skin:     General: Skin is warm and dry.   Neurological:      General: No focal deficit present.      Mental Status: He is alert and oriented to person, place, and time.   Psychiatric:         Mood and Affect: Mood normal.        LINES, CATHETERS, DRAINS, AIRWAYS, AND WOUNDS   Lines, Drains, Airways, Wounds:  Peripheral IV (Adult) 10/31/23 Right Antecubital (Active)   Number of days: 1       External Urinary Catheter (Active)   Number of days: 1       Comments: no other LDAs     LABS / IMAGING / TELE      Labs  Labs reviewed  Results from last 7 days   Lab Units 11/01/23  0403 10/31/23  1113   WBC K/uL 4.76 3.03*   HEMOGLOBIN g/dL 10.6* 11.4*   HEMATOCRIT % 32.2* 34.1*   PLATELETS K/uL 113* 108*      Results from last 7 days   Lab  Units 11/01/23  0403 10/31/23  1113   SODIUM mEQ/L 139 137   POTASSIUM mEQ/L 4.1 4.7   CHLORIDE mEQ/L 110* 106   CO2 mEQ/L 23 22   BUN mg/dL 30* 34*   CREATININE mg/dL 0.9 1.0   CALCIUM mg/dL 8.5* 9.1   ALBUMIN g/dL  --  3.5   BILIRUBIN TOTAL mg/dL  --  0.6   ALK PHOS IU/L  --  113   ALT IU/L  --  32   AST IU/L  --  26   GLUCOSE mg/dL 133* 202*     Microbiology Results     Procedure Component Value Units Date/Time    Urine culture Urine, Clean Catch [[id number]]  (Abnormal) Collected: 10/31/23 1346    Specimen: Urine, Clean Catch Updated: 11/01/23 0943     Urine Culture **Positive Culture**      >=100,000 cfu/mL Gram Negative Rods          Imaging  Imaging reviewed  CT ABDOMEN PELVIS WITH IV CONTRAST    Result Date: 10/31/2023  CLINICAL HISTORY: LLQ abdominal pain RLQ abdominal pain (Age >= 14y) lower abd pain COMMENT: COMPARISON: None. TECHNIQUE:  CT of the abdomen and pelvis was performed with intravenous contrast. Delayed images were obtained through the abdomen. Coronal and sagittal reconstructed images were obtained. ADMINISTERED IV CONTRAST AND DOSE: 100mL of iohexoL (OMNIPAQUE 350) 350 mg iodine/mL solution 100 mL CT DOSE:  One or more dose reduction techniques (e.g. automated exposure control, adjustment of the mA and/or kV according to patient size, use of iterative reconstruction technique) utilized for this examination. FINDINGS: Lung bases: Heart size is normal.   No consolidation, pericardial or pleural effusion. Bibasilar atelectasis is noted. Hepatobiliary: Liver: No focal hepatic mass. Liver is borderline enlarged, measures 18.5 cm in craniocaudal dimension. Gallbladder: Cholelithiasis is noted. Bile Ducts: No intrahepatic or extrahepatic biliary ductal dilatation Spleen: Spleen is enlarged, measures 14.7 cm in AP dimension. Pancreas: No focal mass or ductal dilatation. Adrenal glands: Normal in appearance. Kidneys: No hydronephrosis or solid lesions. Bilateral pelviectasis is noted. GI Tract: GE  junction and stomach:  Unremarkable. Small bowel:  Unremarkable. Appendix:  Unremarkable. Colon and rectum:  Colonic diverticulosis is noted. Pelvic organs and bladder: A pump is noted in the right inguinal region. Prostatectomy is noted. Peritoneum/retroperitoneum: No free air or free fluid. Lymph nodes: No lymphadenopathy. Vessels: Calcified plaque seen in aorta and iliac arteries. Bones and soft tissues: Soft tissues: Unremarkable. Bones: Multilevel degenerative changes of lumbar spine are noted.     IMPRESSION: Borderline hepatosplenomegaly. Colonic diverticulosis without evidence of colonic diverticulitis. Otherwise unremarkable CT abdomen and pelvis study.     CT HEAD WITHOUT IV CONTRAST    Result Date: 10/31/2023  CLINICAL HISTORY: Mental status change, unknown cause AMS COMMENT: COMPARISON: None. TECHNIQUE: CT of the head was performed without intravenous contrast. Coronal and sagittal reformations were obtained. CT DOSE:  One or more dose reduction techniques (e.g. automated exposure control, adjustment of the mA and/or kV according to patient size, use of iterative reconstruction technique) utilized for this examination. FINDINGS: Prominent sulci and ventricles, consistent with generalized cerebral volume loss. Scattered periventricular and subcortical white matter hypodensities, likely represent small vessel ischemic changes. No hyperdense MCA sign is seen. No intracranial hemorrhage or extra-axial collection is identified. No mass effect or midline shift is present. Mucous retention cyst seen in left maxillary sinus. Bones appear normal. Soft tissues are unremarkable.  Bilateral lens implants are noted.     IMPRESSION: No acute intracranial abnormality.     X-RAY CHEST 1 VIEW    Result Date: 10/31/2023  CLINICAL HISTORY: ams COMMENT: COMPARISON: 7/1/2023. TECHNIQUE: One view of the chest was obtained. FINDINGS: Lines/tubes/foreign bodies:  None. Lungs:  The lungs are clear. Pleura:  No right pleural  effusion. No pneumothorax. Left costophrenic angle is not well visualized. Heart / mediastinum:  Cardiac and mediastinal silhouettes are within normal limits. Bones / soft tissues:  Unremarkable.     IMPRESSION: No acute cardiopulmonary process.         ECG/Telemetry  Telemetry reviewed: SR 50-70s PACs    ASSESSMENT AND PLAN      * Complicated UTI (urinary tract infection)  Assessment & Plan  Presents with lower abdominal pain, weakness/ falls   Found to have UTI in setting of recent AUS placement and activation   UCx growing >100,000 GNR  BCx pending   Afebrile without leukocytosis   Continue IV CTX while awaiting cx  S/p AUS deactivation on 10/31/23  Trend bladder scans, straight catheterization as needed  Urology consult appreciated, f/u with Dr Colorado    Acute metabolic encephalopathy  Assessment & Plan  Presents with increased confusion, weakness/ falls per wife  CTH negative for acute pathology  Suspect acute metabolic encephalopathy in setting of UTI  Now improved, appears at baseline  Treat UTI as above  Continue supportive mgmt  Fall precautions   PT/OT consulted    Urinary retention  Assessment & Plan  Urinary retention noted s/p 1x straight catheterization on 11/1  Suspect due to UTI in setting of recent AUS placement and activation   Now improved, bladder scans with minimal retention   Trend bladder scans, straight catheterization as needed  Continue bowel regimen  Encourage OOB/ ambulation     Anemia  Assessment & Plan  Mild, suspect reactive to infection and hydration   No e/o bleeding  Trend CBC    CAD (coronary artery disease)  Assessment & Plan  Hx subclinical CAD ()  Denies chest pain  Continue eliquis, metoprolol    Type 2 diabetes mellitus, without long-term current use of insulin (CMS/[facility])  Assessment & Plan  Glucose mildly elevated   Recent A1C 7.5%  Not interested in treatment per PCP  Continue SSI in the hospital   Diet control     Prostate CA (CMS/HCC)  Assessment & Plan  Hx of  prostate ca s/p prostatectomy and XRT     HTN (hypertension)  Assessment & Plan  BP initially elevated, now improved and stable  Continue metoprolol    Paroxysmal atrial fibrillation (CMS/HCC)  Assessment & Plan  Hx recently dx PAF s/p CV  Currently in NSR  Continue metoprolol, eliquis    Thrombocytopenia (CMS/HCC)  Assessment & Plan  Mild, suspect reactive to infection and hydration   No e/o bleeding  Trend CBC     VTE Assessment: Padua VTE Score: 8  VTE Prophylaxis Plan: eliquis  Code Status: Full Code  Estimated Discharge Date: 11/2/2023  Disposition Planning: d/c when medically stable, cont IV antibiotics, follow cx, pt/ot pending, trend bladder scans, anticipate needs at least 24h     BALAJI Gilliam  11/1/2023

## 2023-11-01 NOTE — ASSESSMENT & PLAN NOTE
Presents with increased confusion, weakness/ falls per wife  CTH negative for acute pathology  Suspect acute metabolic encephalopathy in setting of UTI  Now improved, appears at baseline  Treat UTI as above  Continue supportive mgmt  Fall precautions   PT/OT consulted

## 2023-11-01 NOTE — PLAN OF CARE
Plan of Care Review  Plan of Care Reviewed With: patient, spouse  Outcome Evaluation: Pt reports bladder discomfort; tx with tylenol. Forgetful; re-orientation measures provided. SR on monitor. RA. [place] catheter in place draining orange urine. Most recent bladder scan 137 ml. Wife at bedside. Pt repositioned in bed. Pt reported dizziness earlier in shift, Dr. Alicea aware. Bed alarm engaged. Call bell within reach. Fall bundle intact.

## 2023-11-01 NOTE — ASSESSMENT & PLAN NOTE
Urinary retention noted s/p 1x straight catheterization on 11/1  Suspect due to UTI in setting of recent AUS placement and activation   Now improved, bladder scans with minimal retention   Trend bladder scans, straight catheterization as needed  Continue bowel regimen  Encourage OOB/ ambulation

## 2023-11-01 NOTE — PLAN OF CARE
Problem: Adult Inpatient Plan of Care  Goal: Plan of Care Review  Outcome: Progressing  Flowsheets (Taken 11/1/2023 7889)  Outcome Evaluation: OT IE completed.  Plan of Care Reviewed With:   patient   spouse  Goal: Patient-Specific Goal (Individualized)  Outcome: Progressing

## 2023-11-01 NOTE — PROGRESS NOTES
"   Occupational Therapy -  Initial Evaluation     Patient: Jose Fuentes  Location: [address] [address]  MRN: [id number]  Today's date: 11/1/2023    HISTORY OF PRESENT ILLNESS     Jose is a 89 y.o. male admitted on 10/31/2023 with Lower urinary tract infectious disease [N39.0]  Confusion [R41.0]  UTI (urinary tract infection) [N39.0]  New onset atrial fibrillation (CMS/HCC) [I48.91]  SIRS (systemic inflammatory response syndrome) (CMS/HCC) [R65.10]. Principal problem is Complicated UTI (urinary tract infection).    Past Medical History  Jose has a past medical history of CAD (coronary artery disease), ventricular tachycardia, and Prostate cancer (CMS/HCC).    History of Present Illness     Patient w/ history of prostate cancer s/p prostatectomy and x 7 weeks s/p artificial urinary sphincter surgery presents w/ lower abdominal pain and difficulty urinating. Admitted w/ UTI.     Sphincter was activated x 7 days ago but de-activated during hospital admission     PRIOR LEVEL OF FUNCTION AND LIVING ENVIRONMENT     Prior Level of Function    Flowsheet Row Most Recent Value   Dominant Hand right   Ambulation independent   Transferring independent   Toileting independent   Bathing independent   Dressing independent   Eating independent   IADLs independent   Driving/Transportation    Communication understands/communicates without difficulty   Swallowing swallows foods/liquids without difficulty   Prior Level of Function Comment Patient is fully independent prior to admission without AD. Drives, goes to the gym 3x/week ([employer], \"[employer]\", online classes). Retired in 2006 as an .   Assistive Device Currently Used at Home none  [Owns grab bars in shower]        Prior Living Environment    Flowsheet Row Most Recent Value   People in Home spouse   Current Living Arrangements home   Home Accessibility stairs to enter home (Group), stairs within home (Group)   Living " "Environment Comment 4 STH (including basement and attic) w/ wife,  2-3 SIOBHAN w/ left railing,  1/2 bathroom on 1st floor,  full flight to 2nd floor bedroom bathroom. Has access to walk-in and tub shower w/ grab bars        Occupational Profile    Flowsheet Row Most Recent Value   Successful Occupations Independent PTA   Occupational History/Life Experiences Retired - worked as an  aka \"rocket science\",   and then re-,  has six children from first marriage, fourteen grandchildren and four great grandchildren   Performance Patterns Active,  goes to the gym multiple times per week   Environmental Supports and Barriers Supportive wife - also independent/in good health        VITALS AND PAIN     OT Vitals    Date/Time Pulse HR Source SpO2 Pt Activity O2 Therapy BP BP Location BP Method Pt Position Observations [facility]   11/01/23 1414 68 Monitor 92 % At rest None (Room air) 107/52 Left upper arm Automatic Lying OT -  orthostatic vitals PER   11/01/23 1415 70 Monitor -- -- -- 141/78 Left upper arm Automatic Sitting OT -  orthostatic vitals PER   11/01/23 1416 67 Monitor -- -- -- 138/63 Left upper arm Automatic Standing OT -  orthostatic vitals PER      OT Pain    Date/Time Pain Type Location Rating: Rest Rating: Activity [facility]   11/01/23 1414 Pain Assessment other (see comments) Bladder 0 2 PER           Objective   OBJECTIVE     Start time:  1414  End time:  1445  Session Length: 31 min  Mode of Treatment: individual therapy, occupational therapy    General Observations  Patient received supine, in bed. He was agreeable to therapy, no issues or concerns identified by nurse prior to session. Rec'd sleeping in bed. Wife, Vy, at bedside    Precautions: fall (Monitor orthostatic vitals)       Limitations/Impairments: safety/cognitive      OT Eval and Treat - 11/01/23 1447        Cognition    Orientation Status oriented x 4     Affect/Mental Status WFL     Follows Commands follows multi-step " commands;over 90% accuracy;repetition of directions required     Cognitive Function WFL;executive function deficit     Executive Function Deficit minimal deficit;impulse control;insight/awareness of deficits;self-monitoring/self-correction     Comment, Cognition Pleasant, cooperative, motivated to participate. Demonstrates mild lack of awareness into deficits and has a tendency to move impulsively at times        Vision Assessment/Intervention    Vision Assessment WFL;corrective lenses for reading        Hearing Assessment    Hearing Status WFL        Sensory Assessment    Sensory Assessment sensation intact, upper extremities        Upper Extremity Assessment    UE Assessment ROM and Strength WFL        Bed Mobility    Bed Mobility Activities left;supine to sit     [place] close supervision     Safety/Cues increased time to complete     Assistive Device bed rails;head of bed elevated     Comment HOB = 30*; reports moderate dizziness once seated at edge of bed w/ delayed responses observed - BP stable, symptoms resolved after 2-3 minutes        Mobility Belt    Mobility Belt Used for All Out of Bed Activity yes        Sit/Stand Transfer    Surface chair with arm rests;edge of bed     [place] close supervision     Safety/Cues minimal;verbal cues;impulsivity     Assistive Device none     Transfer Comments Cues for pacing during transitions        Toilet Transfer    Transfer Technique sit/stand     [place] close supervision     Assistive Device grab bars/safety frame        Functional Mobility    Distance in room/bathroom;hallway     Functional Mobility [place] minimum assist (75% or more patient effort);close supervision     Safety/Cues minimal;verbal cues;impulsivity;maintaining center of gravity over base of support     Assistive Device none     Functional Mobility Comments Ambulating in unit hallway without AD (patient declines RW). Initially min (A) w/ lateral postural sway although progressed  to close supervision by end of mobility        Upper Body Dressing    Tasks doff;don;hospital gown     [place] supervision;set up     Safety/Cues minimal;verbal cues;sequencing     Position supported sitting     Adaptive Equipment none        Lower Body Dressing    Tasks doff;don;socks     [place] close supervision     Safety/Cues increased time to complete     Position supported sitting     Adaptive Equipment none     Comment Crossed-leg technique w/ effort        Toileting    [place] dependent (less than 25% patient effort)     Comment [place] catheter dislodged during session. Patient recently underwent artificial urinary sphincter - de-activated during hospital stay. Constant urinary incontinence during session        Balance    Static Sitting Balance WFL;unsupported;sitting, edge of bed     Dynamic Sitting Balance WFL;unsupported;sitting, edge of bed     Sit to Stand Dynamic Balance mild impairment;unsupported     Static Standing Balance mild impairment;unsupported     Dynamic Standing Balance mild impairment;unsupported     Comment, Balance Declines RW - mild unsteadiness without AD, especially during turns although no overt loss of balance        Impairments/Safety Issues    Impairments Affecting Function balance;endurance/activity tolerance;strength                               Education Documentation  Treatment Plan, taught by Hector Nelson OT at 11/1/2023  2:51 PM.  Learner: Patient  Readiness: Acceptance  Method: Explanation  Response: Verbalizes Understanding  Comment: OT role/POC; safety w/ transfers, ambulation and ADLs; orthostatic vitals monitoring; recommendations for home        Session Outcome  Patient upright, in chair, leg(s) elevated at end of session, chair alarm on, all needs met, call light in reach, personal items in reach. Nursing notified about change in vital signs, patient's performance, patient's position, and patient's response to therapy/activity.    AM-PAC - ADL  "(Current Function)     Putting on/taking off regular lower body clothing 3 - A Little   Bathing 3 - A Little   Toileting 3 - A Little   Putting on/taking off regular upper body clothing 3 - A Little   Help for taking care of personal grooming 3 - A Little   Eating meals 4 - None   AM-PAC ADL Score 19      ASSESSMENT AND PLAN     Progress Summary  OT IE completed for patient s/p recent artificial urinary sphincter surgery admitted w/ UTI. At baseline, he is fully independent without AD. On evaluation today, orthostatic vitals were negative although he reports moderate dizziness seated edge of bed. He declines RW and requires min (A) w/ progression to close supervision for ambulation in hallway. He performs seated UBD and LBD w/ supervision- close supervision and is incontinent of urine throughout session (sphincter recently de-activated). Anticipate safe discharge home w/ wife once stable.    Patient/Family Therapy Goal Statement: \"I'd like to be getting out of the bed more now\"    OT Plan    Flowsheet Row Most Recent Value   Rehab Potential good, to achieve stated therapy goals at 11/01/2023 1447   Therapy Frequency 4 times/wk at 11/01/2023 1447   Planned Therapy Interventions activity tolerance training, occupation/activity based interventions, patient/caregiver education/training, strengthening exercise, transfer/mobility retraining, functional balance retraining, ROM/therapeutic exercise at 11/01/2023 1447          OT Discharge Recommendations    Flowsheet Row Most Recent Value   OT Recommended Discharge Disposition home with assistance  [Home w/ wife] at 11/01/2023 1447   Anticipated Equipment Needs if Discharged Home (OT) none  [Would benefit from RW but will decline] at 11/01/2023 1447               OT Goals    Flowsheet Row Most Recent Value   Transfer Goal 1    Activity/Assistive Device all transfers, no assistive device at 11/01/2023 1447   [place] independent at 11/01/2023 1447   Time Frame by " discharge at 11/01/2023 1447   Progress/Outcome goal ongoing at 11/01/2023 1447   Dressing Goal 1    Activity/Adaptive Equipment dressing skills, all at 11/01/2023 1447   [place] modified independence at 11/01/2023 1447   Time Frame by discharge at 11/01/2023 1447   Strategies/Barriers Seated/standing w/ rest breaks as needed at 11/01/2023 1447   Progress/Outcome goal ongoing at 11/01/2023 1447   Toileting Goal 1    Activity/Assistive Device toileting skills, all at 11/01/2023 1447   [place] independent at 11/01/2023 1447   Time Frame by discharge at 11/01/2023 1447   Progress/Outcome goal ongoing at 11/01/2023 1447

## 2023-11-01 NOTE — UM PHYSICIAN REVIEW NOTE
Utilization Secondary Review Note      Patient Name: Jose Fuentes      MRN: [id number]    Inpatient level of care is appropriate for this patient admitted for weakness, urinary retention, and encephalopathy.  Remains on IV antibiotics for presumed UTI and awaiting additional PT eval.  Pt requires >2 MN of HLOC.  Discussed w/ primary service.       Ashley Sweeney MD  11/1/2023

## 2023-11-01 NOTE — PLAN OF CARE
Problem: Adult Inpatient Plan of Care  Goal: Plan of Care Review  Flowsheets (Taken 11/1/2023 3776)  Progress: no change  Outcome Evaluation: Pt got bladder scan every 6 hours, and straight cath this morning for pvr 309 ml, drainage was 400 ml. [place] in place. IV NSS at 80 Ml continues. Call bell within reach and fall precaution bundle in place.

## 2023-11-01 NOTE — PROGRESS NOTES
"Urology Progress Note    Subjective    Interval History: patient straight cathed overnight. Now with condom cath in place and urine is flowing    Objective    Vital signs in last 24 hours:  Temp:  [35.6 °C (96.1 °F)-38 °C (100.4 °F)] 36.5 °C (97.7 °F)  Heart Rate:  [] 67  Resp:  [13-22] 18  BP: (111-197)/(61-84) 111/64      Intake/Output Summary (Last 24 hours) at 11/1/2023 0954  Last data filed at 11/1/2023 0752  Gross per 24 hour   Intake 130 ml   Output 900 ml   Net -770 ml       Physical Exam:  Visit Vitals  /64 (BP Location: Left upper arm, Patient Position: Lying)   Pulse 67   Temp 36.5 °C (97.7 °F) (Oral)   Resp 18   Ht 1.854 m (6' 1\")   Wt 91.8 kg (202 lb 6.1 oz)   SpO2 93%   BMI 26.70 kg/m²       General Appearance:  Alert, cooperative, no distress, appears stated age   Head:  Normocephalic, without obvious abnormality, atraumatic   Back:   No CVA tenderness   Lungs:   Respirations unlabored   Chest wall:  No tenderness or deformity       Abdomen:   Soft, non-tender   : Condom cath in place, urine is clear/orange       Extremities:  Musculoskeletal: Extremities normal, atraumatic, no cyanosis or edema  No injury or deformity       Skin: Skin color, texture, turgor normal, no rashes or lesions     Labs  Lab Results   Component Value Date    WBC 4.76 11/01/2023    HGB 10.6 (L) 11/01/2023    HCT 32.2 (L) 11/01/2023     (L) 11/01/2023    ALT 32 10/31/2023    AST 26 10/31/2023     11/01/2023    K 4.1 11/01/2023     (H) 11/01/2023    CREATININE 0.9 11/01/2023    BUN 30 (H) 11/01/2023    CO2 23 11/01/2023    TSH 1.81 07/01/2023    HGBA1C 7.5 (H) 10/16/2023         Imaging  Not applicable       Assessment & Plan     Patient Active Problem List   Diagnosis    Paroxysmal atrial fibrillation (CMS/HCC)    HTN (hypertension)    Herpes simplex    Incontinence    Prostate CA (CMS/HCC)    Complicated UTI (urinary tract infection)    Hyperglycemia       Assessment   89 y.o. male s/p " prostatectomy s/p AUS placement 7 weeks ago, activated 7 days ago now with UTI       AUS deactivated at bedside 10/31        Plan      - monitor voids  - bladder scan PRN  - Recommend condom cath use  - await culture results  - continue abx  - eventual outpatient follow up with Dr. Colorado       Expected Discharge Date:  11/1/2023  No discharge date for patient encounter.  MAITE Gomez  11/1/2023

## 2023-11-01 NOTE — HOSPITAL COURSE
Jose is a 89 y.o. male admitted on 10/31/2023 with Lower urinary tract infectious disease [N39.0]  Confusion [R41.0]  UTI (urinary tract infection) [N39.0]  New onset atrial fibrillation (CMS/HCC) [I48.91]  SIRS (systemic inflammatory response syndrome) (CMS/HCC) [R65.10]. Principal problem is Complicated UTI (urinary tract infection).    Past Medical History  Jose has a past medical history of CAD (coronary artery disease), ventricular tachycardia, and Prostate cancer (CMS/HCC).    History of Present Illness     Patient w/ history of prostate cancer s/p prostatectomy and x 7 weeks s/p artificial urinary sphincter surgery presents w/ lower abdominal pain and difficulty urinating. Admitted w/ UTI.     Sphincter was activated x 7 days ago but de-activated during hospital admission

## 2023-11-02 VITALS
WEIGHT: 202.38 LBS | DIASTOLIC BLOOD PRESSURE: 61 MMHG | BODY MASS INDEX: 26.82 KG/M2 | OXYGEN SATURATION: 96 % | TEMPERATURE: 98.4 F | HEART RATE: 54 BPM | RESPIRATION RATE: 18 BRPM | HEIGHT: 73 IN | SYSTOLIC BLOOD PRESSURE: 118 MMHG

## 2023-11-02 LAB
ANION GAP SERPL CALC-SCNC: 4 MEQ/L (ref 3–15)
ATRIAL RATE: 106
BACTERIA UR CULT: ABNORMAL
BACTERIA UR CULT: ABNORMAL
BUN SERPL-MCNC: 26 MG/DL (ref 7–25)
CALCIUM SERPL-MCNC: 8.3 MG/DL (ref 8.6–10.3)
CHLORIDE SERPL-SCNC: 112 MEQ/L (ref 98–107)
CO2 SERPL-SCNC: 24 MEQ/L (ref 21–31)
CREAT SERPL-MCNC: 0.9 MG/DL (ref 0.7–1.3)
ERYTHROCYTE [DISTWIDTH] IN BLOOD BY AUTOMATED COUNT: 12.6 % (ref 11.6–14.4)
GFR SERPL CREATININE-BSD FRML MDRD: >60 ML/MIN/1.73M*2
GLUCOSE BLD-MCNC: 148 MG/DL (ref 70–99)
GLUCOSE BLD-MCNC: 274 MG/DL (ref 70–99)
GLUCOSE SERPL-MCNC: 143 MG/DL (ref 70–99)
HCT VFR BLDCO AUTO: 30.8 % (ref 40.1–51)
HGB BLD-MCNC: 10.1 G/DL (ref 13.7–17.5)
MAGNESIUM SERPL-MCNC: 1.9 MG/DL (ref 1.8–2.5)
MCH RBC QN AUTO: 32.4 PG (ref 28–33.2)
MCHC RBC AUTO-ENTMCNC: 32.8 G/DL (ref 32.2–36.5)
MCV RBC AUTO: 98.7 FL (ref 83–98)
P AXIS: 48
PDW BLD AUTO: 11.4 FL (ref 9.4–12.4)
PHOSPHATE SERPL-MCNC: 3.4 MG/DL (ref 2.4–4.7)
PLATELET # BLD AUTO: 112 K/UL (ref 150–350)
POCT TEST: ABNORMAL
POCT TEST: ABNORMAL
POTASSIUM SERPL-SCNC: 4.1 MEQ/L (ref 3.5–5.1)
PR INTERVAL: 154
QRS DURATION: 88
QT INTERVAL: 340
QTC CALCULATION(BAZETT): 451
R AXIS: 15
RBC # BLD AUTO: 3.12 M/UL (ref 4.5–5.8)
SODIUM SERPL-SCNC: 140 MEQ/L (ref 136–145)
T WAVE AXIS: 36
VENTRICULAR RATE: 106
WBC # BLD AUTO: 3.68 K/UL (ref 3.8–10.5)

## 2023-11-02 PROCEDURE — 85027 COMPLETE CBC AUTOMATED: CPT | Performed by: NURSE PRACTITIONER

## 2023-11-02 PROCEDURE — 97530 THERAPEUTIC ACTIVITIES: CPT | Mod: GP

## 2023-11-02 PROCEDURE — 83735 ASSAY OF MAGNESIUM: CPT | Performed by: STUDENT IN AN ORGANIZED HEALTH CARE EDUCATION/TRAINING PROGRAM

## 2023-11-02 PROCEDURE — 63700000 HC SELF-ADMINISTRABLE DRUG: Performed by: INTERNAL MEDICINE

## 2023-11-02 PROCEDURE — 97162 PT EVAL MOD COMPLEX 30 MIN: CPT | Mod: GP

## 2023-11-02 PROCEDURE — 84100 ASSAY OF PHOSPHORUS: CPT | Performed by: STUDENT IN AN ORGANIZED HEALTH CARE EDUCATION/TRAINING PROGRAM

## 2023-11-02 PROCEDURE — 80048 BASIC METABOLIC PNL TOTAL CA: CPT | Performed by: NURSE PRACTITIONER

## 2023-11-02 PROCEDURE — 36415 COLL VENOUS BLD VENIPUNCTURE: CPT | Performed by: NURSE PRACTITIONER

## 2023-11-02 PROCEDURE — 99239 HOSP IP/OBS DSCHRG MGMT >30: CPT | Performed by: STUDENT IN AN ORGANIZED HEALTH CARE EDUCATION/TRAINING PROGRAM

## 2023-11-02 RX ORDER — CEPHALEXIN 500 MG/1
500 CAPSULE ORAL 4 TIMES DAILY
Qty: 20 CAPSULE | Refills: 0 | Status: SHIPPED | OUTPATIENT
Start: 2023-11-02 | End: 2023-11-07

## 2023-11-02 RX ORDER — DOCUSATE SODIUM 100 MG/1
200 CAPSULE, LIQUID FILLED ORAL DAILY
Qty: 60 CAPSULE | Refills: 0 | Status: SHIPPED | OUTPATIENT
Start: 2023-11-03 | End: 2024-05-13 | Stop reason: HOSPADM

## 2023-11-02 RX ADMIN — INSULIN LISPRO 3 UNITS: 100 INJECTION, SOLUTION INTRAVENOUS; SUBCUTANEOUS at 12:53

## 2023-11-02 RX ADMIN — APIXABAN 5 MG: 5 TABLET, FILM COATED ORAL at 09:05

## 2023-11-02 RX ADMIN — VALACYCLOVIR HYDROCHLORIDE 500 MG: 1 TABLET, FILM COATED ORAL at 09:05

## 2023-11-02 RX ADMIN — METOPROLOL SUCCINATE 25 MG: 25 TABLET, EXTENDED RELEASE ORAL at 09:05

## 2023-11-02 ASSESSMENT — BALANCE ASSESSMENTS
EYES CLOSED SCORE: UNSTEADY
BALANCE SCORE: 10
TOTAL SCORE: 16
SITTING BALANCE: STEADY BUT WIDE STANCE, USES CANE OR OTHER SUPPORT
TURNING 360 DEGREES STEADINESS: UNSTEADY (GRABS, STAGGERS)
ATTEMPTS TO ARISE: ABLE, REQUIRES MORE THAN ONE ATTEMPT
ARISES: ABLE, USES ARMS TO HELP
IMMEDIATE STANDING BALANCE FIRST 5 SECONDS: STEADY WITHOUT WALKER OR OTHER SUPPORT
SITTING DOWN: USES ARMS OR NOT A SMOOTH MOTION
SITTING BALANCE: STEADY, SAFE
NUDGED: STEADY WITHOUT WALKER OR OTHER SUPPORT

## 2023-11-02 ASSESSMENT — GAIT ASSESSMENTS
RIGHT STEP LENGTH AND HEIGHT: RIGHT FOOT COMPLETE CLEARS FLOOR
INITIATION OF GAIT SCORE: NO HESITANCY
LEFT STEP LENGTH AND HEIGHT: LEFT FOOT COMPLETE CLEARS FLOOR
RIGHT STEP LENGTH AND HEIGHT: DOES NOT PASS LEFT STANCE FOOT WITH STEP
WALKING TIME: HEELS APART
LEFT STEP LENGTH AND HEIGHT: DOES NOT PASS RIGHT STANCE FOOT WITH STEP
PATH: MILD/MODERATE DEVIATION OR USES WALKING AID
STEP CONTINUITY: STEPS APPEAR CONTINUOUS
GAIT SCORE: 6
TRUNK: MARKED SWAY OR USES WALKING AID
STEP SYMMETRY: RIGHT AND LEFT STEP APPEAR EQUAL
TINETTI GAIT ASSESSMENT: CONTINUOUS

## 2023-11-02 ASSESSMENT — COGNITIVE AND FUNCTIONAL STATUS - GENERAL
WALKING IN HOSPITAL ROOM: 3 - A LITTLE
MOVING TO AND FROM BED TO CHAIR: 3 - A LITTLE
MOVING TO AND FROM BED TO CHAIR: 3 - A LITTLE
CLIMB 3 TO 5 STEPS WITH RAILING: 3 - A LITTLE
STANDING UP FROM CHAIR USING ARMS: 3 - A LITTLE
CLIMB 3 TO 5 STEPS WITH RAILING: 3 - A LITTLE
STANDING UP FROM CHAIR USING ARMS: 3 - A LITTLE
WALKING IN HOSPITAL ROOM: 3 - A LITTLE
AFFECT: WFL

## 2023-11-02 NOTE — DISCHARGE SUMMARY
[facility] Medicine Service -  Inpatient Discharge Summary        BRIEF OVERVIEW   Patient: Jose Fuentes (2/16/1932)    Admitting Provider: London Alicea MD  Attending Provider: London Alicea MD Attending phys phone: [phone]    PCP: Grace Stroud [phone]    Admission Date: 10/31/2023  Discharge Date: 11/2/2023     DISCHARGE DIAGNOSES      Primary Discharge Diagnosis  Complicated UTI (urinary tract infection)    Secondary Discharge Diagnoses  Active Hospital Problems    Diagnosis Date Noted    Urinary retention 11/01/2023    SIRS (systemic inflammatory response syndrome) (CMS/HCC) 11/01/2023    CAD (coronary artery disease)     Anemia     Thrombocytopenia (CMS/HCC)     Acute metabolic encephalopathy     Complicated UTI (urinary tract infection) 10/31/2023    Type 2 diabetes mellitus, without long-term current use of insulin (CMS/HCC) 10/31/2023    Prostate CA (CMS/HCC) 07/01/2023    HTN (hypertension) 07/01/2023    Paroxysmal atrial fibrillation (CMS/HCC) 07/01/2023      Resolved Hospital Problems   No resolved problems to display.       Problem List on Day of Discharge  Acute metabolic encephalopathy  Assessment & Plan  Presents with increased confusion, weakness/ falls per wife  CTH negative for acute pathology  Suspect acute metabolic encephalopathy in setting of UTI  Now improved, appears at baseline  Treat UTI as above  Continue supportive mgmt  Fall precautions   PT/OT consulted    Thrombocytopenia (CMS/[facility])  Assessment & Plan  Mild, suspect reactive to infection and hydration   No e/o bleeding  Trend CBC    Anemia  Assessment & Plan  Mild, suspect reactive to infection and hydration   No e/o bleeding  Trend CBC    CAD (coronary artery disease)  Assessment & Plan  Hx subclinical CAD ()  Denies chest pain  Continue eliquis, metoprolol    Urinary retention  Assessment & Plan  Urinary retention noted s/p 1x straight catheterization on 11/1  Suspect due to UTI in setting of  recent AUS placement and activation   Now improved, bladder scans with minimal retention   Trend bladder scans, straight catheterization as needed  Continue bowel regimen  Encourage OOB/ ambulation     Type 2 diabetes mellitus, without long-term current use of insulin (CMS/[facility])  Assessment & Plan  Glucose mildly elevated   Recent A1C 7.5%  Not interested in treatment per PCP  Continue SSI in the hospital   Diet control     Prostate CA (CMS/[facility])  Assessment & Plan  Hx of prostate ca s/p prostatectomy and XRT     HTN (hypertension)  Assessment & Plan  BP initially elevated, now improved and stable  Continue metoprolol    Paroxysmal atrial fibrillation (CMS/[facility])  Assessment & Plan  Hx recently dx PAF s/p CV  Currently in NSR  Continue metoprolol, eliquis    * Complicated UTI (urinary tract infection)  Assessment & Plan  Presents with lower abdominal pain, weakness/ falls   Found to have UTI in setting of recent AUS placement and activation   UCx growing >100,000 GNR  BCx pending   Afebrile without leukocytosis   Continue IV CTX while awaiting cx  S/p AUS deactivation on 10/31/23  Trend bladder scans, straight catheterization as needed  Urology consult appreciated, f/u with Dr Colorado        SUMMARY OF HOSPITALIZATION      Presenting Problem/History of Present Illness  This is a 89 y.o. year-old male admitted on 10/31/2023 with Complicated UTI (urinary tract infection).     Hospital Course    The patient is a 89 year old man with pmh CAD, DM2, prostate CA s/p prostatectomy and XRT s/p AUS (artificial urinary sphincter) with recent activation, HTN, PAF on Eliquis who presented with lower abdominal pain and generalize weakness, found to have possible urinary retention and UTI in the setting of recently activated AUS. Clinically unable to determine if AUS is direct cause of infection but could be contributing factor. UA positive for infection, urine culture with pan sensitive E coli, started on abx with ceftriaxone. Urology  deactivated AUS, retention improved. PT/OT for weakness recommended home with home health and front wheeled walker. Patient transitioned to cephalexin 500mg QID for 5 more days to complete 7 day course. For follow up with PCP and Urology for ongoing management of incontinence and AUS as well as things like diabetes for which he was monitoring and doing dietary changes with PCP outpatient.     Clinically Unable to Determine cause of anemia and thrombocytopenia with occasional pancytopenia, likely multifactorial with infection, nutritional deficits, and underlying bone marrow suppression.    Exam on Day of Discharge  Physical Exam  Vitals and nursing note reviewed.   Constitutional:       General: He is not in acute distress.     Appearance: Normal appearance. He is not ill-appearing or toxic-appearing.   HENT:      Head: Normocephalic and atraumatic.      Right Ear: External ear normal.      Left Ear: External ear normal.      Nose: No rhinorrhea.      Mouth/Throat:      Mouth: Mucous membranes are moist.      Pharynx: Oropharynx is clear.   Eyes:      General: No scleral icterus.     Extraocular Movements: Extraocular movements intact.      Conjunctiva/sclera: Conjunctivae normal.      Pupils: Pupils are equal, round, and reactive to light.   Cardiovascular:      Rate and Rhythm: Normal rate and regular rhythm.      Heart sounds: No murmur heard.     No gallop.   Pulmonary:      Effort: Pulmonary effort is normal. No respiratory distress.      Breath sounds: Normal breath sounds. No wheezing or rhonchi.   Abdominal:      General: Bowel sounds are normal. There is no distension.      Palpations: Abdomen is soft.      Tenderness: There is no abdominal tenderness. There is no guarding.   Musculoskeletal:         General: No swelling or tenderness.      Cervical back: Normal range of motion and neck supple. No tenderness.      Right lower leg: No edema.      Left lower leg: No edema.   Skin:     General: Skin is warm  and dry.      [place]: Skin is not jaundiced.      Findings: No rash.   Neurological:      General: No focal deficit present.      Mental Status: He is alert and oriented to person, place, and time. Mental status is at baseline.      Motor: No weakness.   Psychiatric:         Mood and Affect: Mood normal.         Behavior: Behavior normal.         Consults During Admission  IP CONSULT TO UROLOGY    DISCHARGE MEDICATIONS               Medication List      START taking these medications    cephalexin 500 mg capsule  Commonly known as: KEFLEX  Take 1 capsule (500 mg total) by mouth 4 (four) times a day for 5 days.  Dose: 500 mg     docusate sodium 100 mg capsule  Commonly known as: COLACE  Start taking on: November 3, 2023  Take 2 capsules (200 mg total) by mouth daily.  Dose: 200 mg        CONTINUE taking these medications    apixaban 5 mg tablet  Commonly known as: ELIQUIS  Take 5 mg by mouth 2 (two) times a day.  Dose: 5 mg     CENTRUM SILVER MEN ORAL  Take 1 tablet by mouth every morning.  Dose: 1 tablet     metoprolol succinate XL 25 mg 24 hr tablet  Commonly known as: TOPROL-XL  Take 25 mg by mouth every morning.  Dose: 25 mg     valACYclovir 500 mg tablet  Commonly known as: VALTREX  Take 500 mg by mouth every morning.  Dose: 500 mg             Instructions for after discharge     Follow-up with primary physician (PCP)      1 week    Follow-up with provider      Dmitriy Colorado MD   [phone]    [address] [facility] [address]  [place] [id number]       Other Follow-up      UROLOGY             PROCEDURES / LABS / IMAGING      Operative Procedures  Deactivated AUS by urology    Other Procedures  none    Pertinent Labs  radiology: CT scan: see below      Pertinent Imaging  CT ABDOMEN PELVIS WITH IV CONTRAST    Result Date: 10/31/2023  IMPRESSION: Borderline hepatosplenomegaly. Colonic diverticulosis without evidence of colonic diverticulitis. Otherwise unremarkable CT abdomen and pelvis study.     CT HEAD  WITHOUT IV CONTRAST    Result Date: 10/31/2023  IMPRESSION: No acute intracranial abnormality.     X-RAY CHEST 1 VIEW    Result Date: 10/31/2023  IMPRESSION: No acute cardiopulmonary process.       OUTPATIENT  FOLLOW-UP / REFERRALS / PENDING TESTS        Outpatient Follow-Up Appointments  Encounter Information    This patient does not currently have any appointments scheduled.         Referrals  No orders of the defined types were placed in this encounter.      Test Results Pending at Discharge  Unresulted Labs (From admission, onward)     Start     Ordered    10/31/23 1112  [place] Draw Panel  STAT        Question Answer Comment   Red Top 1 Label    Gold Top 1 Label    Light Blue 1 Label    Lavender Top No Labels    Pink Top No Labels    Yellow - Urine Tall No Labels    Urine Culture Tube No Labels    Blood Culture No Labels        10/31/23 1112                Important Issues to Address in Follow-Up  Follow up with urology for ongoing management of AUS.  Finishing 5 days of abx with cephalexin for E coli UTI.    DISCHARGE DISPOSITION AND DESTINATION      Disposition: Home   Destination:                              Code Status At Discharge: Full Code    I spent 35 minutes on this date of service performing the following activities: obtaining history, performing examination, entering orders, documenting, preparing for visit, obtaining / reviewing records, providing counseling and education, independently reviewing study/studies, communicating results and coordinating care.      Physician Order for Life-Sustaining Treatment Document Status      No documents found

## 2023-11-02 NOTE — PROGRESS NOTES
"Urology Progress Note    Subjective    Interval History: patient straight cathed overnight. Now with condom cath in place and urine is flowing    Objective    Vital signs in last 24 hours:  Temp:  [36.4 °C (97.5 °F)-36.8 °C (98.2 °F)] 36.5 °C (97.7 °F)  Heart Rate:  [61-73] 65  Resp:  [18] 18  BP: (107-141)/(49-78) 136/52      Intake/Output Summary (Last 24 hours) at 11/2/2023 0839  Last data filed at 11/2/2023 0400  Gross per 24 hour   Intake 450 ml   Output 1250 ml   Net -800 ml       Physical Exam:  Visit Vitals  BP (!) 136/52 (BP Location: Right upper arm, Patient Position: Lying)   Pulse 65   Temp 36.5 °C (97.7 °F) (Oral)   Resp 18   Ht 1.854 m (6' 1\")   Wt 91.8 kg (202 lb 6.1 oz)   SpO2 96%   BMI 26.70 kg/m²       General Appearance:  Alert, cooperative, no distress, appears stated age   Head:  Normocephalic, without obvious abnormality, atraumatic   Back:   No CVA tenderness   Lungs:   Respirations unlabored   Chest wall:  No tenderness or deformity       Abdomen:   Soft, non-tender   : Condom cath in place, urine is clear/orange       Extremities:  Musculoskeletal: Extremities normal, atraumatic, no cyanosis or edema  No injury or deformity       Skin: Skin color, texture, turgor normal, no rashes or lesions     Labs  Lab Results   Component Value Date    WBC 3.68 (L) 11/02/2023    HGB 10.1 (L) 11/02/2023    HCT 30.8 (L) 11/02/2023     (L) 11/02/2023    ALT 32 10/31/2023    AST 26 10/31/2023     11/02/2023    K 4.1 11/02/2023     (H) 11/02/2023    CREATININE 0.9 11/02/2023    BUN 26 (H) 11/02/2023    CO2 24 11/02/2023    TSH 1.81 07/01/2023    HGBA1C 7.5 (H) 10/16/2023         Imaging  Not applicable       Assessment & Plan     Patient Active Problem List   Diagnosis    Paroxysmal atrial fibrillation (CMS/HCC)    HTN (hypertension)    Herpes simplex    Incontinence    Prostate CA (CMS/HCC)    Complicated UTI (urinary tract infection)    Type 2 diabetes mellitus, without long-term " current use of insulin (CMS/[facility])    Urinary retention    CAD (coronary artery disease)    Anemia    Thrombocytopenia (CMS/HCC)    Acute metabolic encephalopathy    SIRS (systemic inflammatory response syndrome) (CMS/[facility])       Assessment   89 y.o. male s/p prostatectomy s/p AUS placement 7 weeks ago, activated 7 days ago now with UTI       AUS deactivated at bedside 10/31        Plan      -Patient voiding well with low PVRs  -Follow-up urine culture  -Continue with deactivated AUS and follow-up outpatient for reactivation after infection is adequately treated.  - eventual outpatient follow up with Dr. [place]       Expected Discharge Date:  11/1/2023  No discharge date for patient encounter.  Bharat Palacio MD  11/2/2023

## 2023-11-02 NOTE — NURSING NOTE
Pt cleared for d/c. IV d/c. NO tele monitor. Per Dr. Alicea 12 pm bladder scan not needed before d/c. Pt refusing wheelchair for him; PT and Dr. Alicea aware.     RN reviewed all d/c instructions with pt and wife at bedside. Pt/wife verbalized understanding of all d/c instructions. Pt provided w copy of d/c instructions. Pt gathered all personal belongings. Pt awaiting transport.

## 2023-11-02 NOTE — NURSING NOTE
Patient refused Insulin, stating he was not informed of a DM II diagnosis nor a new medication. Pt stated he would like to discuss with his primary physician before taking any new medication. Education provided by RN, pt verbalized understanding, HMS made aware.

## 2023-11-02 NOTE — PROGRESS NOTES
"   Physical Therapy -  Initial Evaluation     Patient: Jose Fuentes  Location: [address] [address]  MRN: [id number]  Today's date: 11/2/2023    HISTORY OF PRESENT ILLNESS     Jose is a 89 y.o. male admitted on 10/31/2023 with Lower urinary tract infectious disease [N39.0]  Confusion [R41.0]  UTI (urinary tract infection) [N39.0]  New onset atrial fibrillation (CMS/HCC) [I48.91]  SIRS (systemic inflammatory response syndrome) (CMS/HCC) [R65.10]. Principal problem is Complicated UTI (urinary tract infection).    Past Medical History  Jose has a past medical history of CAD (coronary artery disease), ventricular tachycardia, and Prostate cancer (CMS/HCC).    History of Present Illness     Patient w/ history of prostate cancer s/p prostatectomy and x 7 weeks s/p artificial urinary sphincter surgery presents w/ lower abdominal pain and difficulty urinating. Admitted w/ UTI.     Sphincter was activated x 7 days ago but de-activated during hospital admission     PRIOR LEVEL OF FUNCTION AND LIVING ENVIRONMENT     Prior Level of Function    Flowsheet Row Most Recent Value   Dominant Hand right   Ambulation independent   Transferring independent   Toileting independent   Bathing independent   Dressing independent   Eating independent   IADLs independent   Driving/Transportation    Communication understands/communicates without difficulty   Swallowing swallows foods/liquids without difficulty   Prior Level of Function Comment Patient is fully independent prior to admission without AD. Drives, goes to the gym 3x/week ([employer], \"[employer]\", online classes). Retired in 2006 as an .   Assistive Device Currently Used at Home none  [Owns grab bars in shower]        Prior Living Environment    Flowsheet Row Most Recent Value   People in Home spouse   Current Living Arrangements home   Home Accessibility stairs to enter home (Group), stairs within home (Group)   Living " Environment Comment 4 STH (including basement and attic) w/ wife,  2-3 SIOBHAN w/ left railing,  1/2 bathroom on 1st floor,  full flight to 2nd floor bedroom bathroom. Has access to walk-in and tub shower w/ grab bars        VITALS AND PAIN     PT Vitals    Date/Time Pulse SpO2 Pt Activity O2 Therapy BP [facility]   11/02/23 1016 68 95 % At rest None (Room air) 130/85 ERS   11/02/23 1051 73 94 % Walking None (Room air) 184/75 ERS      PT Pain    Date/Time Rating: Rest Rating: Activity [facility]   11/02/23 1016 0 - no pain 0 - no pain ERS           Objective   OBJECTIVE     Start time:  1016  End time:  1052  Session Length: 36 min  Mode of Treatment: physical therapy, individual therapy    General Observations  Patient received in bed. He was agreeable to therapy, no issues or concerns identified by nurse prior to session.      Precautions: fall (I&O)       Limitations/Impairments: hearing, safety/cognitive      PT Eval and Treat - 11/02/23 1016        Cognition    Orientation Status oriented x 3     Affect/Mental Status WFL     Follows Commands follows one-step commands;75-90% accuracy;physical/tactile prompts required;repetition of directions required;verbal cues/prompting required     Cognitive Function memory deficit;attention deficit;safety deficit     Attention Deficit requires cues/redirection to task;perseverates on recent conversation;distractible in noisy environment     Comment, Attention impulsive        Vision Assessment/Intervention    Vision Assessment corrective lenses for reading        Hearing Assessment    Hearing Status hearing aid, bilateral        Sensory Assessment    Sensory Assessment sensation intact, lower extremities        Lower Extremity Assessment    LE Assessment ROM and Strength WFL        Bed Mobility    Bed Mobility Activities supine to sit     [place] close supervision     Safety/Cues increased time to complete     Assistive Device none        Mobility Belt    Mobility Belt Used for All Out  of Bed Activity yes        Sit/Stand Transfer    Surface edge of bed     [place] close supervision     Assistive Device none        Gait Training    [place], Gait close supervision     Assistive Device none     Distance in Feet 120 feet     Pattern step-through     Deviations/Abnormal Patterns base of support, wide;stride length decreased;step length decreased     Comment (Gait/Stairs) increased trunk sway with direction changes, head turns, and distractions in hallway, PT provides CGA/steading touch during these obstacles. Attempted amb with RW with superv x 15' with improved step length, decreased trunk sway and small BRIAN; however, pt distracted by height of RW and requiring multiple VC's to redirect for proper RW use due to pt lifting RW off floor and causing 1 LOB with mod A to correct.        Stairs Training    [place], Stairs close supervision     Assistive Device railing   B hands on 1 HR    Handrail Location (Stairs) left side (ascending)     Number of Stairs 12     Ascending Stairs Technique step-to-step     Descending Stairs Technique step-to-step        Balance    Static Sitting Balance WFL     Dynamic Sitting Balance WFL     Sit to Stand Dynamic Balance mild impairment     Static Standing Balance mild impairment     Dynamic Standing Balance moderate impairment     Comment, Balance with no AD        Impairments/Safety Issues    Impairments Affecting Function balance;cognition;endurance/activity tolerance;postural/trunk control;strength     Cognitive Impairments, Safety/Performance attention;insight into deficits/self-awareness;safety precaution awareness;safety precaution follow-through;judgment;awareness, need for assistance;impulsivity;problem-solving/reasoning                      [place]  Sitting Balance: Steady, safe  Arises: Able, uses arms to help  Attempts to Arise: Able, requires more than one attempt  Immediate Standing Balance (First 5 Seconds): Steady without walker or other  support  Standing Balance: Steady but wide stance, uses cane or other support  Nudged: Steady without walker or other support  Eyes Closed: Unsteady  Turned 360 Degrees: Steadiness: Unsteady (grabs, staggers)  Turned 360 Degrees: Continuity of Steps: Continuous  Sitting Down: Uses arms or not a smooth motion  Balance Score: 10  Initiation of Gait: No hesitancy  Step Height: R Swing Foot: Right foot complete clears floor  Step Length: R Swing Foot: Does not pass left stance foot with step  Step Height: L Swing Foot: Left foot complete clears floor  Step Length: L Swing Foot: Does not pass right stance foot with step  Step Symmetry: Right and left step appear equal  Step Continuity: Steps appear continuous  Path: Mild/moderate deviation or uses walking aid  Trunk: Marked sway or uses walking aid  Walking Time: Heels apart  Gait Score: 6  Total Score: 16        Education Documentation  Unresolved/Worsening Symptoms, taught by Melina Whitehead PT at 11/2/2023 11:56 AM.  Learner: Significant Other, Patient  Readiness: Acceptance  Method: Explanation, Demonstration  Response: Verbalizes Understanding, Demonstrated Understanding, Needs Reinforcement  Comment: role of PT, POC, safety    Joint Mobility/Strength, taught by Melina Whitehead PT at 11/2/2023 11:56 AM.  Learner: Significant Other, Patient  Readiness: Acceptance  Method: Explanation, Demonstration  Response: Verbalizes Understanding, Demonstrated Understanding, Needs Reinforcement  Comment: role of PT, POC, safety    Home Safety, taught by Melina Whitehead PT at 11/2/2023 11:56 AM.  Learner: Significant Other, Patient  Readiness: Acceptance  Method: Explanation, Demonstration  Response: Verbalizes Understanding, Demonstrated Understanding, Needs Reinforcement  Comment: role of PT, POC, safety    Energy Conservation, taught by Melina Whitehead PT at 11/2/2023 11:56 AM.  Learner: Significant Other, Patient  Readiness: Acceptance  Method: Explanation,  Demonstration  Response: Verbalizes Understanding, Demonstrated Understanding, Needs Reinforcement  Comment: role of PT, POC, safety    Assistive/Adaptive Devices, taught by Melina Whitehead PT at 11/2/2023 11:56 AM.  Learner: Significant Other, Patient  Readiness: Acceptance  Method: Explanation, Demonstration  Response: Verbalizes Understanding, Demonstrated Understanding, Needs Reinforcement  Comment: role of PT, POC, safety    Signs/Symptoms, taught by Melina Whitehead PT at 11/2/2023 11:56 AM.  Learner: Significant Other, Patient  Readiness: Acceptance  Method: Explanation, Demonstration  Response: Verbalizes Understanding, Demonstrated Understanding, Needs Reinforcement  Comment: role of PT, POC, safety    Risk Factors, taught by Melina Whitehead PT at 11/2/2023 11:56 AM.  Learner: Significant Other, Patient  Readiness: Acceptance  Method: Explanation, Demonstration  Response: Verbalizes Understanding, Demonstrated Understanding, Needs Reinforcement  Comment: role of PT, POC, safety        Session Outcome  Patient in chair at end of session, call light in reach, personal items in reach, chair alarm on, all needs met. Nursing notified about change in vital signs, patient's response to therapy/activity, patient's performance, and patient's position.    AM-PAC - Mobility (Current Function)     Turning form your back to your side while in flat bed without using bedrails 3 - A Little   Moving from lying on your back to sitting on the side of a flat bed without using bedrails 3 - A Little   Moving to and from a bed to a chair 3 - A Little   Standing up from a chair using your arms 3 - A Little   To walk in a hospital room 3 - A Little   Climbing 3-5 steps with a railing 3 - A Little   AM-PAC Mobility Score 18      ASSESSMENT AND PLAN     Progress Summary  Pt presents at close supervision functional mobility level with use of no AD, which is a decline from his baseline independent mobility with on AD.   Ampac=18,which indicates home dispo.  Tinetti= high risk for future falls. Use of RW may be beneficial for future fall risk prevention if proper use and traning can occur prior to unsupervised use by patient.  Pt will benefit from continued skilled PT to maximize independence and safety.    Patient/Family Therapy Goals Statement: pt voices preference for outpatient PT over home PT, and wanting to return to the gym ASAP    PT Plan    Flowsheet Row Most Recent Value   Rehab Potential good, to achieve stated therapy goals at 11/02/2023 1016   Therapy Frequency 3 times/wk at 11/02/2023 1016   Planned Therapy Interventions balance training, bed mobility training, gait training, home exercise program, patient/family education, postural re-education, ROM (range of motion), stair training, strengthening, transfer training at 11/02/2023 1016          PT Discharge Recommendations    Flowsheet Row Most Recent Value   PT Recommended Discharge Disposition home with home health, home with assistance at 11/02/2023 1016   Anticipated Equipment Needs if Discharged Home (PT) walker, front-wheeled at 11/02/2023 1016               PT Goals    Flowsheet Row Most Recent Value   Bed Mobility Goal 1    Activity/Assistive Device sit to supine/supine to sit at 11/02/2023 1016   [place] independent at 11/02/2023 1016   Time Frame by discharge at 11/02/2023 1016   Progress/Outcome goal ongoing at 11/02/2023 1016   Transfer Goal 1    Activity/Assistive Device sit-to-stand/stand-to-sit at 11/02/2023 1016   [place] independent at 11/02/2023 1016   Time Frame by discharge at 11/02/2023 1016   Progress/Outcome goal ongoing at 11/02/2023 1016   Gait Training Goal 1    Activity/Assistive Device gait (walking locomotion) at 11/02/2023 1016   [place] independent at 11/02/2023 1016   Distance 200 at 11/02/2023 1016   Time Frame by discharge at 11/02/2023 1016   Progress/Outcome goal ongoing at 11/02/2023 1016   Stairs Goal 1     Activity/Assistive Device ascending stairs, descending stairs, using handrail, right at 11/02/2023 1016   [place] modified independence at 11/02/2023 1016   Number of Stairs 12 at 11/02/2023 1016   Time Frame by discharge at 11/02/2023 1016   Progress/Outcome goal ongoing at 11/02/2023 1016

## 2023-11-02 NOTE — PLAN OF CARE
Problem: Adult Inpatient Plan of Care  Goal: Plan of Care Review  Outcome: Progressing  Flowsheets (Taken 11/2/2023 7465)  Progress: no change  Outcome Evaluation: PT eval complete  Plan of Care Reviewed With:   patient   spouse     Problem: Mobility Impairment  Goal: Optimal Mobility  Outcome: Progressing

## 2023-11-02 NOTE — PLAN OF CARE
Plan of Care Review  Plan of Care Reviewed With: patient, spouse  Progress: improving  Outcome Evaluation: Patient denies pain or discomfort. Ambulating to the bathroom, supervised by staff. Bladder scans Q6 hours continued, pt effectively emptying bladder throughout shift. Discussed scheduled and prn medications, pt verbalized understanding. Pt resting in bed overnight, bed alarm on, call bell within reach.

## 2023-11-04 LAB
BACTERIA BLD CULT: NORMAL
BACTERIA BLD CULT: NORMAL

## 2023-11-25 ENCOUNTER — HOSPITAL ENCOUNTER (EMERGENCY)
Facility: HOSPITAL | Age: 88
Discharge: HOME | End: 2023-11-25
Attending: EMERGENCY MEDICINE
Payer: MEDICARE

## 2023-11-25 VITALS
HEART RATE: 78 BPM | TEMPERATURE: 96.7 F | SYSTOLIC BLOOD PRESSURE: 194 MMHG | RESPIRATION RATE: 14 BRPM | OXYGEN SATURATION: 98 % | DIASTOLIC BLOOD PRESSURE: 80 MMHG

## 2023-11-25 DIAGNOSIS — R39.89 BLADDER PAIN: Primary | ICD-10-CM

## 2023-11-25 LAB
BACTERIA URNS QL MICRO: ABNORMAL /HPF
BILIRUB UR QL STRIP.AUTO: NEGATIVE MG/DL
CLARITY UR REFRACT.AUTO: CLEAR
COLOR UR AUTO: YELLOW
GLUCOSE UR STRIP.AUTO-MCNC: NEGATIVE MG/DL
HGB UR QL STRIP.AUTO: ABNORMAL
HYALINE CASTS #/AREA URNS LPF: ABNORMAL /LPF
KETONES UR STRIP.AUTO-MCNC: NEGATIVE MG/DL
LEUKOCYTE ESTERASE UR QL STRIP.AUTO: ABNORMAL
MUCOUS THREADS URNS QL MICRO: 1 /LPF
NITRITE UR QL STRIP.AUTO: NEGATIVE
PH UR STRIP.AUTO: 6 [PH]
PROT UR QL STRIP.AUTO: ABNORMAL
RBC #/AREA URNS HPF: ABNORMAL /HPF
SP GR UR REFRACT.AUTO: 1.02
SQUAMOUS URNS QL MICRO: ABNORMAL /HPF
UROBILINOGEN UR STRIP-ACNC: 0.2 EU/DL
WBC #/AREA URNS HPF: ABNORMAL /HPF

## 2023-11-25 PROCEDURE — 87077 CULTURE AEROBIC IDENTIFY: CPT | Mod: 59 | Performed by: EMERGENCY MEDICINE

## 2023-11-25 PROCEDURE — 99283 EMERGENCY DEPT VISIT LOW MDM: CPT

## 2023-11-25 PROCEDURE — 81001 URINALYSIS AUTO W/SCOPE: CPT | Performed by: EMERGENCY MEDICINE

## 2023-11-25 ASSESSMENT — ENCOUNTER SYMPTOMS
FEVER: 0
DYSURIA: 0
WEAKNESS: 0
NAUSEA: 0
VOMITING: 0
NUMBNESS: 0
COUGH: 0
DIARRHEA: 0
SHORTNESS OF BREATH: 0
ABDOMINAL PAIN: 0
COLOR CHANGE: 0

## 2023-11-25 NOTE — DISCHARGE INSTRUCTIONS
You were seen here today in concern of a urinary tract infection.  Your urine analysis did not reveal obvious signs of UTI warranting antibiotics today.  This was sent off for urine culture that will result in a few days.  Please follow-up with your primary care physician or your urologist regarding the urine culture results and your symptoms today.    Should you develop abdominal pain, fever, vomiting,  pain or difficulty with urination or any new or concerning symptoms seek medical attention or return.

## 2023-11-25 NOTE — ED PROVIDER NOTES
"Emergency Medicine Note  HPI   HISTORY OF PRESENT ILLNESS     HPI     89 y.o. male with PMHx prostate CA s/p prostatectomy s/p AUS placement who presents for evaluation in concern of UTI. Patient reports recent hospitalization about a month ago for UTI for which he was prescribed abx and AUS was deactivated. Since discharge AUS was reactivated and he's been doing well. He reports bladder pain \"when my bladder's full and I have to urinate\" and fatigue x few days. He was concerned he may have a UTI again prompting him to call PCP. PCP suggested he come here for official urine culture prior to covering him with antibiotics. He denies fever/chills, nausea/vomiting, abdominal pain, flank pain, headache, neck pain, cough, back pain, rash or other complaints. Reports eating and drinking normally.          Patient History   PAST HISTORY     Reviewed from Nursing Triage:       Past Medical History:   Diagnosis Date   • CAD (coronary artery disease)    • Hx of ventricular tachycardia    • Prostate cancer (CMS/HCC)        History reviewed. No pertinent surgical history.    History reviewed. No pertinent family history.    Social History     Tobacco Use   • Smoking status: Never   • Smokeless tobacco: Never   Vaping Use   • Vaping Use: Never used   Substance Use Topics   • Alcohol use: Yes     Comment: socially   • Drug use: Never         Review of Systems   REVIEW OF SYSTEMS     Review of Systems   Constitutional: Negative for fever.   Respiratory: Negative for cough and shortness of breath.    Cardiovascular: Negative for chest pain.   Gastrointestinal: Negative for abdominal pain, diarrhea, nausea and vomiting.   Genitourinary: Negative for dysuria.        Bladder pain   Skin: Negative for color change and rash.   Neurological: Negative for weakness and numbness.         VITALS     ED Vitals    Date/Time Temp Pulse Resp BP SpO2 [facility]   11/25/23 1325 35.9 °C (96.7 °F) 66 18 126/61 98 % MT                       Physical Exam "   PHYSICAL EXAM     Physical Exam  Vitals and nursing note reviewed.   Constitutional:       Appearance: He is well-developed.   HENT:      Head: Normocephalic and atraumatic.   Eyes:      Conjunctiva/sclera: Conjunctivae normal.   Cardiovascular:      Rate and Rhythm: Normal rate and regular rhythm.   Pulmonary:      Effort: Pulmonary effort is normal.      Breath sounds: Normal breath sounds.   Abdominal:      General: There is no distension.      Palpations: Abdomen is soft. There is no mass.      Tenderness: There is no abdominal tenderness.   Musculoskeletal:         General: No tenderness or deformity. Normal range of motion.      Cervical back: Normal range of motion.   Skin:     General: Skin is warm and dry.   Neurological:      General: No focal deficit present.      Mental Status: He is alert and oriented to person, place, and time. Mental status is at baseline.      Cranial Nerves: No cranial nerve deficit.      Motor: No weakness.   Psychiatric:         Behavior: Behavior normal.           PROCEDURES     Procedures     DATA     Results     None          Imaging Results    None         No orders to display       Scoring tools                                  ED Course & MDM   MDM / ED COURSE / CLINICAL IMPRESSION / DISPO     Medical Decision Making    This patient presents with bladder discomfort when bladder is full.   On arrival to the ED patient denies suprapubic or abdominal pain.  No fever/chills, n/v, dysuria, hematuria, flank pain.   Well appearing. UA not concerning for infection.   We will await urine culture, advised patient importance of follow up with his pcp/urologist regarding this and his ER visit today.   Return precautions discussed. Patient comfortable with plan for discharge home, ambulating in the department well appearing without complaints.     Amount and/or Complexity of Data Reviewed  Labs: ordered.             Clinical Impression      None               Radha Vaz PA  C  12/02/23 2039

## 2023-11-25 NOTE — ED ATTESTATION NOTE
The patient was evaluated and managed by the physician assistant / nurse practitioner. I agree.  I discussed the case with the PA who saw and evaluated the patient.  Doubt acute cystitis.  No evidence of acute urinary retention.  Patient is to be discharged and close follow-up with urologist.    Labs Reviewed   UA REFLEX CULTURE (MACROSCOPIC) - Abnormal       Result Value    Color, Urine Yellow      Clarity, Urine Clear      Specific Gravity, Urine 1.020      pH, Urine 6.0      Leukocyte Esterase Trace (*)     Nitrite, Urine Negative      Protein, Urine Trace (*)     Glucose, Urine Negative      Ketones, Urine Negative      Urobilinogen, Urine 0.2      Bilirubin, Urine Negative      Blood, Urine Trace (*)    UA MICROSCOPIC (UCU) - Abnormal    RBC, Urine 5 TO 9 (*)     WBC, Urine 4 TO 10 (*)     Squamous Epithelial Rare (*)     Hyaline Cast 3 TO 9 (*)     Bacteria, Urine None Seen      Mucus +1 (*)    URINE CULTURE   URINALYSIS REFLEX CULTURE (ED AND OUTPATIENT ONLY)    Narrative:     The following orders were created for panel order Urinalysis with Reflex Culture.  Procedure                               Abnormality         Status                     ---------                               -----------         ------                     UA Reflex to Culture (Ma...[[id number]]  Abnormal            Final result               UA Microscopic[[id number]]               Abnormal            Final result                 Please view results for these tests on the individual orders.            Chris Grissom MD  11/25/23 1337

## 2023-11-29 LAB
BACTERIA UR CULT: ABNORMAL

## 2024-05-08 ENCOUNTER — APPOINTMENT (EMERGENCY)
Dept: RADIOLOGY | Facility: HOSPITAL | Age: 89
DRG: 536 | End: 2024-05-08
Attending: STUDENT IN AN ORGANIZED HEALTH CARE EDUCATION/TRAINING PROGRAM
Payer: MEDICARE

## 2024-05-08 ENCOUNTER — HOSPITAL ENCOUNTER (INPATIENT)
Facility: HOSPITAL | Age: 89
LOS: 5 days | Discharge: NURSING, REHAB OR LONG-TERM CARE | DRG: 536 | End: 2024-05-13
Attending: EMERGENCY MEDICINE | Admitting: HOSPITALIST
Payer: MEDICARE

## 2024-05-08 DIAGNOSIS — R06.09 DOE (DYSPNEA ON EXERTION): ICD-10-CM

## 2024-05-08 DIAGNOSIS — R55 SYNCOPE, UNSPECIFIED SYNCOPE TYPE: Primary | ICD-10-CM

## 2024-05-08 DIAGNOSIS — S72.001A CLOSED FRACTURE OF RIGHT HIP, INITIAL ENCOUNTER (CMS/HCC): ICD-10-CM

## 2024-05-08 LAB
ALBUMIN SERPL-MCNC: 3.7 G/DL (ref 3.5–5.7)
ALP SERPL-CCNC: 95 IU/L (ref 34–125)
ALT SERPL-CCNC: 15 IU/L (ref 7–52)
ANION GAP SERPL CALC-SCNC: 7 MEQ/L (ref 3–15)
AST SERPL-CCNC: 16 IU/L (ref 13–39)
BASOPHILS # BLD: 0.01 K/UL (ref 0.01–0.1)
BASOPHILS NFR BLD: 0.1 %
BILIRUB SERPL-MCNC: 0.6 MG/DL (ref 0.3–1.2)
BUN SERPL-MCNC: 28 MG/DL (ref 7–25)
CALCIUM SERPL-MCNC: 9 MG/DL (ref 8.6–10.3)
CHLORIDE SERPL-SCNC: 106 MEQ/L (ref 98–107)
CO2 SERPL-SCNC: 23 MEQ/L (ref 21–31)
CREAT SERPL-MCNC: 0.9 MG/DL (ref 0.7–1.3)
DIFFERENTIAL METHOD BLD: ABNORMAL
EGFRCR SERPLBLD CKD-EPI 2021: >60 ML/MIN/1.73M*2
EOSINOPHIL # BLD: 0.07 K/UL (ref 0.04–0.54)
EOSINOPHIL NFR BLD: 1 %
ERYTHROCYTE [DISTWIDTH] IN BLOOD BY AUTOMATED COUNT: 13.1 % (ref 11.6–14.4)
GLUCOSE BLD-MCNC: 179 MG/DL (ref 70–99)
GLUCOSE SERPL-MCNC: 178 MG/DL (ref 70–99)
HCT VFR BLD AUTO: 33 % (ref 40.1–51)
HGB BLD-MCNC: 11.1 G/DL (ref 13.7–17.5)
IMM GRANULOCYTES # BLD AUTO: 0.03 K/UL (ref 0–0.08)
IMM GRANULOCYTES NFR BLD AUTO: 0.4 %
LYMPHOCYTES # BLD: 1.25 K/UL (ref 1.2–3.5)
LYMPHOCYTES NFR BLD: 17.6 %
MCH RBC QN AUTO: 32.1 PG (ref 28–33.2)
MCHC RBC AUTO-ENTMCNC: 33.6 G/DL (ref 32.2–36.5)
MCV RBC AUTO: 95.4 FL (ref 83–98)
MONOCYTES # BLD: 0.68 K/UL (ref 0.3–1)
MONOCYTES NFR BLD: 9.6 %
NEUTROPHILS # BLD: 5.07 K/UL (ref 1.7–7)
NEUTS SEG NFR BLD: 71.3 %
NRBC BLD-RTO: 0 %
PDW BLD AUTO: 10.7 FL (ref 9.4–12.4)
PLATELET # BLD AUTO: 152 K/UL (ref 150–350)
POCT TEST: ABNORMAL
POTASSIUM SERPL-SCNC: 4.2 MEQ/L (ref 3.5–5.1)
PROT SERPL-MCNC: 6.1 G/DL (ref 6–8.2)
RBC # BLD AUTO: 3.46 M/UL (ref 4.5–5.8)
SODIUM SERPL-SCNC: 136 MEQ/L (ref 136–145)
TROPONIN I SERPL HS-MCNC: 3.2 PG/ML
TROPONIN I SERPL HS-MCNC: 3.4 PG/ML
WBC # BLD AUTO: 7.11 K/UL (ref 3.8–10.5)

## 2024-05-08 PROCEDURE — 63700000 HC SELF-ADMINISTRABLE DRUG: Performed by: STUDENT IN AN ORGANIZED HEALTH CARE EDUCATION/TRAINING PROGRAM

## 2024-05-08 PROCEDURE — 71045 X-RAY EXAM CHEST 1 VIEW: CPT

## 2024-05-08 PROCEDURE — 99222 1ST HOSP IP/OBS MODERATE 55: CPT | Performed by: HOSPITALIST

## 2024-05-08 PROCEDURE — 70450 CT HEAD/BRAIN W/O DYE: CPT

## 2024-05-08 PROCEDURE — 63600000 HC DRUGS/DETAIL CODE: Mod: JZ | Performed by: STUDENT IN AN ORGANIZED HEALTH CARE EDUCATION/TRAINING PROGRAM

## 2024-05-08 PROCEDURE — 85025 COMPLETE CBC W/AUTO DIFF WBC: CPT | Performed by: STUDENT IN AN ORGANIZED HEALTH CARE EDUCATION/TRAINING PROGRAM

## 2024-05-08 PROCEDURE — 25800000 HC PHARMACY IV SOLUTIONS: Performed by: STUDENT IN AN ORGANIZED HEALTH CARE EDUCATION/TRAINING PROGRAM

## 2024-05-08 PROCEDURE — 93005 ELECTROCARDIOGRAM TRACING: CPT | Performed by: STUDENT IN AN ORGANIZED HEALTH CARE EDUCATION/TRAINING PROGRAM

## 2024-05-08 PROCEDURE — 99284 EMERGENCY DEPT VISIT MOD MDM: CPT | Mod: 25

## 2024-05-08 PROCEDURE — 73502 X-RAY EXAM HIP UNI 2-3 VIEWS: CPT | Mod: RT

## 2024-05-08 PROCEDURE — 84443 ASSAY THYROID STIM HORMONE: CPT | Performed by: HOSPITALIST

## 2024-05-08 PROCEDURE — 84484 ASSAY OF TROPONIN QUANT: CPT | Performed by: STUDENT IN AN ORGANIZED HEALTH CARE EDUCATION/TRAINING PROGRAM

## 2024-05-08 PROCEDURE — 96374 THER/PROPH/DIAG INJ IV PUSH: CPT

## 2024-05-08 PROCEDURE — 36415 COLL VENOUS BLD VENIPUNCTURE: CPT | Performed by: STUDENT IN AN ORGANIZED HEALTH CARE EDUCATION/TRAINING PROGRAM

## 2024-05-08 PROCEDURE — 96361 HYDRATE IV INFUSION ADD-ON: CPT

## 2024-05-08 PROCEDURE — 12000000 HC ROOM AND CARE MED/SURG

## 2024-05-08 PROCEDURE — 80053 COMPREHEN METABOLIC PANEL: CPT | Performed by: STUDENT IN AN ORGANIZED HEALTH CARE EDUCATION/TRAINING PROGRAM

## 2024-05-08 PROCEDURE — 73552 X-RAY EXAM OF FEMUR 2/>: CPT | Mod: RT

## 2024-05-08 PROCEDURE — 84484 ASSAY OF TROPONIN QUANT: CPT | Mod: 91 | Performed by: STUDENT IN AN ORGANIZED HEALTH CARE EDUCATION/TRAINING PROGRAM

## 2024-05-08 RX ORDER — ACETAMINOPHEN 325 MG/1
650 TABLET ORAL ONCE
Status: COMPLETED | OUTPATIENT
Start: 2024-05-08 | End: 2024-05-08

## 2024-05-08 RX ORDER — MORPHINE SULFATE 2 MG/ML
2 INJECTION, SOLUTION INTRAMUSCULAR; INTRAVENOUS ONCE
Status: COMPLETED | OUTPATIENT
Start: 2024-05-08 | End: 2024-05-08

## 2024-05-08 RX ORDER — ONDANSETRON HYDROCHLORIDE 2 MG/ML
4 INJECTION, SOLUTION INTRAVENOUS ONCE
Status: COMPLETED | OUTPATIENT
Start: 2024-05-08 | End: 2024-05-08

## 2024-05-08 RX ADMIN — SODIUM CHLORIDE 500 ML: 9 INJECTION, SOLUTION INTRAVENOUS at 19:46

## 2024-05-08 RX ADMIN — MORPHINE SULFATE 2 MG: 2 INJECTION, SOLUTION INTRAMUSCULAR; INTRAVENOUS at 23:30

## 2024-05-08 RX ADMIN — ACETAMINOPHEN 650 MG: 325 TABLET, FILM COATED ORAL at 21:23

## 2024-05-08 RX ADMIN — ONDANSETRON 4 MG: 2 INJECTION INTRAMUSCULAR; INTRAVENOUS at 19:47

## 2024-05-08 NOTE — ED PROVIDER NOTES
Emergency Medicine Note  HPI   HISTORY OF PRESENT ILLNESS     89-year-old male past medical history of CAD presenting to the emergency department with syncope.    Approximately 1 hour prior to arrival the patient had 1 to 2-minute episode of unresponsiveness.  Since this episode he has had nausea, nonspecific dizziness.  No fevers, chills, vomiting, diarrhea.    Additionally, earlier today the patient fell while in a store.  He landed on his right hip and did not strike his head.  On apixaban for paroxysmal A-fib.          Patient History   PAST HISTORY     Reviewed from Nursing Triage:       Past Medical History:   Diagnosis Date   • CAD (coronary artery disease)    • Hx of ventricular tachycardia    • Prostate cancer (CMS/HCC)        History reviewed. No pertinent surgical history.    History reviewed. No pertinent family history.    Social History     Tobacco Use   • Smoking status: Never   • Smokeless tobacco: Never   Vaping Use   • Vaping Use: Never used   Substance Use Topics   • Alcohol use: Yes     Comment: socially   • Drug use: Never         Review of Systems   REVIEW OF SYSTEMS     Review of Systems      VITALS     ED Vitals      Date/Time Temp Pulse Resp BP SpO2 [facility]   05/08/24 1830 35.9 °C (96.6 °F) 51 14 153/70 96 % BL                         Physical Exam   PHYSICAL EXAM     Physical Exam  Vitals and nursing note reviewed.   Constitutional:       Appearance: He is well-developed.      Comments: Ill-appearing, nontoxic, no acute distress   HENT:      Head: Normocephalic and atraumatic.   Eyes:      Conjunctiva/sclera: Conjunctivae normal.   Cardiovascular:      Rate and Rhythm: Normal rate and regular rhythm.      Heart sounds: No murmur heard.  Pulmonary:      Effort: Pulmonary effort is normal. No respiratory distress.      Breath sounds: Normal breath sounds.   Abdominal:      Palpations: Abdomen is soft.      Tenderness: There is no abdominal tenderness.   Musculoskeletal:      Cervical back: Neck  supple.   Skin:     General: Skin is warm and dry.   Neurological:      Mental Status: He is alert.           PROCEDURES     Procedures     DATA     Results       None            Imaging Results    None         No orders to display       Scoring tools                                  ED Course & MDM   MDM / ED COURSE / CLINICAL IMPRESSION / DISPO     Medical Decision Making  89-year-old male presenting to the emergency department with syncope, fall earlier today, found to have right greater trochanteric fracture.  Given his vital signs, age, history, he is moderate to high risk syncope.  Additionally, his nonspecific dizziness could be neurologic in origin.  Finally, I spoke with orthopedic surgery who recommended MRI of the right hip.  Admit the patient to hospital medicine for further management of syncope, dizziness, right hip fracture.    Amount and/or Complexity of Data Reviewed  Labs: ordered. Decision-making details documented in ED Course.  Radiology: ordered. Decision-making details documented in ED Course.  ECG/medicine tests: ordered.    Risk  OTC drugs.  Prescription drug management.        ED Course as of 05/08/24 2338   Wed May 08, 2024   1955 High Sens Troponin I: 3.4  #1 [LK]   1955 Hemoglobin(!): 11.1  At baseline [LK]   2106 CT HEAD WITHOUT IV CONTRAST  IMPRESSION:  No acute intracranial abnormality. [LK]   2106 POCT Bedside Glucose(!): 179 [LK]   2337 High Sens Troponin I: 3.2  #2 [LK]      ED Course User Index  [LK] Marie Day MD     Clinical Impression      None                 Pierre Fraser MD  Resident  05/08/24 2218

## 2024-05-08 NOTE — ED ATTESTATION NOTE
ED ATTENDING ATTESTATION NOTE  5/8/2024, 7:54 PM    I supervised the care provided by the Resident (Evelio). We have discussed the case. I have personally seen and examined Jose Fuentes.  I personally performed the key components of the encounter and have been involved in the care and medical decision making for this patient.    I reviewed and agree with the Resident's assessment and plan of care, with any exceptions as documented below.      My focused history, examination, assessment, and plan of care of Jose Fuentes is as follows:    Brief History:  The patient presents with syncope PTA while sitting. His wife reports that he had sonorous respirations   and was unresponsive x 1-2 minutes PTA. There was no fall or injury at this time, but the pt's wife states that earlier in the day there was a fall at a lighting store in which the pt. was pulled down when the wife lost her balance while walking. Admits to R hip pain since that fall. There was no head injury or LOC.     Vital Signs Review: Vital signs have been reviewed. The initial oxygen saturation is SpO2: 96 % on room air. Interpretation: normal  ED Vitals      Date/Time Temp Pulse Resp BP SpO2 [facility]   05/08/24 1830 35.9 °C (96.6 °F) 51 14 153/70 96 % BL          Focused Physical Exam:   Constitutional:    Comments: Appears in no acute distress  HENT:   Head: Normocephalic and atraumatic.   Cardiovascular:   Rate and Rhythm: Bradycardia. Regular rhythm.   Heart sounds: Normal heart sounds.   Pulmonary:   Effort: Pulmonary effort is normal. No respiratory distress.   Breath sounds: Normal breath sounds.   Abdominal:   Palpation: Abdomen is soft.   Tenderness: There is no abdominal tenderness, guarding or rebound.   Extremities:  RLE: TTP to the hip. Good ROM. NVSI distally. The leg is not shortened or externally rotated.   Neurological:   Mental Status: Alert and oriented to person, place, and time. Mental status is at baseline. Grossly  nonfocal.    Assessment / Plan / MDM:   Episode of unresponsiveness PTA in a pt who had a mechanical fall earlier in the day with R hip pain since. FSG, CBC, CMP, troponin, EKG, CXR, right hip x-ray, CT head, IV fluids.  Reevaluate.    Medical Decision Making  Problems Addressed:  Closed fracture of right hip, initial encounter (CMS/[facility]): acute illness or injury  Syncope, unspecified syncope type: acute illness or injury    Amount and/or Complexity of Data Reviewed  Labs: ordered. Decision-making details documented in ED Course.  Radiology: ordered. Decision-making details documented in ED Course.  ECG/medicine tests: ordered.    Risk  OTC drugs.  Prescription drug management.  Decision regarding hospitalization.    NOTE: The patient was seen during a time of increased patient volume, and increased boarding in ED which often surpasses departmental capacity.  These challenges may have contributed to lengthened stay in the ED, and a portion of the patient's management and initial evaluation may have been done while in the waiting room.  In addition, this document was created using dragon dictation software. There might be some typographical errors due to this technology. 27           Marie Day MD  05/09/24 2036

## 2024-05-09 ENCOUNTER — APPOINTMENT (INPATIENT)
Dept: RADIOLOGY | Facility: HOSPITAL | Age: 89
DRG: 536 | End: 2024-05-09
Attending: NURSE PRACTITIONER
Payer: MEDICARE

## 2024-05-09 ENCOUNTER — APPOINTMENT (INPATIENT)
Dept: CARDIOLOGY | Facility: HOSPITAL | Age: 89
DRG: 536 | End: 2024-05-09
Attending: HOSPITALIST
Payer: MEDICARE

## 2024-05-09 PROBLEM — R40.4 EPISODE OF UNRESPONSIVENESS: Status: ACTIVE | Noted: 2024-05-09

## 2024-05-09 PROBLEM — S72.111A: Status: ACTIVE | Noted: 2024-05-09

## 2024-05-09 PROBLEM — D62 ABLA (ACUTE BLOOD LOSS ANEMIA): Status: ACTIVE | Noted: 2024-05-09

## 2024-05-09 PROBLEM — S72.001A CLOSED FRACTURE OF RIGHT HIP (CMS/HCC): Status: ACTIVE | Noted: 2024-05-08

## 2024-05-09 PROBLEM — D61.818 PANCYTOPENIA (CMS/HCC): Status: ACTIVE | Noted: 2024-05-09

## 2024-05-09 PROBLEM — R55 SYNCOPE, UNSPECIFIED SYNCOPE TYPE: Status: RESOLVED | Noted: 2024-05-08 | Resolved: 2024-05-09

## 2024-05-09 LAB
ANION GAP SERPL CALC-SCNC: 5 MEQ/L (ref 3–15)
AORTIC ROOT ANNULUS: 3.5 CM
ASCENDING AORTA: 3.5 CM
AV REG PEAK VEL: 3.16 M/S
AV REGURGITATION PRESSURE HALF TIME: 613 MS
BACTERIA URNS QL MICRO: ABNORMAL /HPF
BASOPHILS # BLD: 0 K/UL (ref 0.01–0.1)
BASOPHILS NFR BLD: 0 %
BILIRUB UR QL STRIP.AUTO: NEGATIVE MG/DL
BSA FOR ECHO PROCEDURE: 2.1 M2
BUN SERPL-MCNC: 25 MG/DL (ref 7–25)
CALCIUM SERPL-MCNC: 8.7 MG/DL (ref 8.6–10.3)
CHLORIDE SERPL-SCNC: 109 MEQ/L (ref 98–107)
CLARITY UR REFRACT.AUTO: CLEAR
CO2 SERPL-SCNC: 26 MEQ/L (ref 21–31)
COLOR UR AUTO: YELLOW
CREAT SERPL-MCNC: 0.8 MG/DL (ref 0.7–1.3)
DIFFERENTIAL METHOD BLD: ABNORMAL
E WAVE DECELERATION TIME: 285 MS
E/A RATIO: 1.1
E/E' RATIO: 7.6
E/LAT E' RATIO: 7.4
EDV (BP): 90.1 CM3
EF (A4C): 72.7 %
EF A2C: 66.2 %
EGFRCR SERPLBLD CKD-EPI 2021: >60 ML/MIN/1.73M*2
EJECTION FRACTION: 69.8 %
EOSINOPHIL # BLD: 0.02 K/UL (ref 0.04–0.54)
EOSINOPHIL NFR BLD: 0.5 %
ERYTHROCYTE [DISTWIDTH] IN BLOOD BY AUTOMATED COUNT: 12.9 % (ref 11.6–14.4)
ESV (BP): 27.2 CM3
FLUAV RNA SPEC QL NAA+PROBE: NEGATIVE
FLUBV RNA SPEC QL NAA+PROBE: NEGATIVE
FRACTIONAL SHORTENING: 45.55 %
GLUCOSE SERPL-MCNC: 149 MG/DL (ref 70–99)
GLUCOSE UR STRIP.AUTO-MCNC: NEGATIVE MG/DL
HCT VFR BLD AUTO: 29.3 % (ref 40.1–51)
HGB BLD-MCNC: 9.7 G/DL (ref 13.7–17.5)
HGB UR QL STRIP.AUTO: ABNORMAL
HYALINE CASTS #/AREA URNS LPF: ABNORMAL /LPF
IMM GRANULOCYTES # BLD AUTO: 0.01 K/UL (ref 0–0.08)
IMM GRANULOCYTES NFR BLD AUTO: 0.3 %
INTERVENTRICULAR SEPTUM: 1.12 CM
KETONES UR STRIP.AUTO-MCNC: NEGATIVE MG/DL
LA ESV (BP): 77.4 CM3
LA ESV INDEX (A2C): 39.81 CM3/M2
LA ESV INDEX (BP): 36.86 CM3/M2
LA/AORTA RATIO: 1.14
LAAS-AP2: 26 CM2
LAAS-AP4: 24.4 CM2
LAD 2D: 4 CM
LALD A4C: 6.64 CM
LALD A4C: 6.99 CM
LAV-S: 83.6 CM3
LEFT ATRIUM VOLUME INDEX: 32.67 CM3/M2
LEFT ATRIUM VOLUME: 68.6 CM3
LEFT INTERNAL DIMENSION IN SYSTOLE: 2.57 CM (ref 3.2–4.85)
LEFT VENTRICLE DIASTOLIC VOLUME INDEX: 41.33 CM3/M2
LEFT VENTRICLE DIASTOLIC VOLUME: 86.8 CM3
LEFT VENTRICLE SYSTOLIC VOLUME INDEX: 11.29 CM3/M2
LEFT VENTRICLE SYSTOLIC VOLUME: 23.7 CM3
LEFT VENTRICULAR INTERNAL DIMENSION IN DIASTOLE: 4.72 CM (ref 5.47–7.6)
LEFT VENTRICULAR POSTERIOR WALL IN END DIASTOLE: 1.08 CM (ref 0.7–1.3)
LEUKOCYTE ESTERASE UR QL STRIP.AUTO: 3
LV DIASTOLIC VOLUME: 92 CM3
LV ESV (APICAL 2 CHAMBER): 31.1 CM3
LVAD-AP2: 28.7 CM2
LVAD-AP4: 27.8 CM2
LVAS-AP2: 14.7 CM2
LVAS-AP4: 12.9 CM2
LVEDVI(A2C): 43.81 CM3/M2
LVEDVI(BP): 42.9 CM3/M2
LVESVI(A2C): 14.81 CM3/M2
LVESVI(BP): 12.95 CM3/M2
LVLD-AP2: 7.4 CM
LVLD-AP4: 7.54 CM
LVLS-AP2: 6.05 CM
LVLS-AP4: 5.94 CM
LVOT 2D: 2.3 CM
LVOT A: 4.15 CM2
LYMPHOCYTES # BLD: 0.66 K/UL (ref 1.2–3.5)
LYMPHOCYTES NFR BLD: 17.6 %
MCH RBC QN AUTO: 32 PG (ref 28–33.2)
MCHC RBC AUTO-ENTMCNC: 33.1 G/DL (ref 32.2–36.5)
MCV RBC AUTO: 96.7 FL (ref 83–98)
MONOCYTES # BLD: 0.43 K/UL (ref 0.3–1)
MONOCYTES NFR BLD: 11.4 %
MUCOUS THREADS URNS QL MICRO: 2 /LPF
MV E'TISSUE VEL-LAT: 0.09 M/S
MV E'TISSUE VEL-MED: 0.09 M/S
MV PEAK A VEL: 0.62 M/S
MV PEAK E VEL: 0.66 M/S
MV STENOSIS PRESSURE HALF TIME: 84 MS
MV VALVE AREA P 1/2 METHOD: 2.62 CM2
NEUTROPHILS # BLD: 2.64 K/UL (ref 1.7–7)
NEUTS SEG NFR BLD: 70.2 %
NITRITE UR QL STRIP.AUTO: NEGATIVE
NRBC BLD-RTO: 0 %
PDW BLD AUTO: 10.8 FL (ref 9.4–12.4)
PH UR STRIP.AUTO: 6 [PH]
PLATELET # BLD AUTO: 109 K/UL (ref 150–350)
POSTERIOR WALL: 1.08 CM
POTASSIUM SERPL-SCNC: 4.3 MEQ/L (ref 3.5–5.1)
PROT UR QL STRIP.AUTO: 1
RBC # BLD AUTO: 3.03 M/UL (ref 4.5–5.8)
RBC #/AREA URNS HPF: ABNORMAL /HPF
RSV RNA SPEC QL NAA+PROBE: NEGATIVE
SARS-COV-2 RNA RESP QL NAA+PROBE: NEGATIVE
SEPTAL TISSUE DOPPLER FREE WALL LATE DIA VELOCITY (APICAL 4 CHAMBER VIEW): 0.18 M/S
SODIUM SERPL-SCNC: 140 MEQ/L (ref 136–145)
SP GR UR REFRACT.AUTO: 1.03
SQUAMOUS URNS QL MICRO: ABNORMAL /HPF
TAPSE: 2.67 CM
TSH SERPL DL<=0.05 MIU/L-ACNC: 0.77 MIU/L (ref 0.34–5.6)
UROBILINOGEN UR STRIP-ACNC: 0.2 EU/DL
WBC # BLD AUTO: 3.76 K/UL (ref 3.8–10.5)
WBC #/AREA URNS HPF: ABNORMAL /HPF
Z-SCORE OF LEFT VENTRICULAR DIMENSION IN END DIASTOLE: -3.12
Z-SCORE OF LEFT VENTRICULAR DIMENSION IN END SYSTOLE: -3.38
Z-SCORE OF LEFT VENTRICULAR POSTERIOR WALL IN END DIASTOLE: 0.68

## 2024-05-09 PROCEDURE — 81001 URINALYSIS AUTO W/SCOPE: CPT | Performed by: HOSPITALIST

## 2024-05-09 PROCEDURE — 80048 BASIC METABOLIC PNL TOTAL CA: CPT | Performed by: HOSPITALIST

## 2024-05-09 PROCEDURE — 25800000 HC PHARMACY IV SOLUTIONS: Performed by: HOSPITALIST

## 2024-05-09 PROCEDURE — 87637 SARSCOV2&INF A&B&RSV AMP PRB: CPT | Performed by: HOSPITALIST

## 2024-05-09 PROCEDURE — 85025 COMPLETE CBC W/AUTO DIFF WBC: CPT | Performed by: HOSPITALIST

## 2024-05-09 PROCEDURE — 63700000 HC SELF-ADMINISTRABLE DRUG: Performed by: HOSPITALIST

## 2024-05-09 PROCEDURE — 73700 CT LOWER EXTREMITY W/O DYE: CPT | Mod: RT

## 2024-05-09 PROCEDURE — 99233 SBSQ HOSP IP/OBS HIGH 50: CPT | Performed by: INTERNAL MEDICINE

## 2024-05-09 PROCEDURE — 93306 TTE W/DOPPLER COMPLETE: CPT

## 2024-05-09 PROCEDURE — 85610 PROTHROMBIN TIME: CPT

## 2024-05-09 PROCEDURE — 20600000 HC ROOM AND CARE INTERMEDIATE/TELEMETRY

## 2024-05-09 PROCEDURE — 87086 URINE CULTURE/COLONY COUNT: CPT | Performed by: HOSPITALIST

## 2024-05-09 PROCEDURE — 86850 RBC ANTIBODY SCREEN: CPT

## 2024-05-09 PROCEDURE — 36415 COLL VENOUS BLD VENIPUNCTURE: CPT | Performed by: HOSPITALIST

## 2024-05-09 PROCEDURE — 86901 BLOOD TYPING SEROLOGIC RH(D): CPT

## 2024-05-09 PROCEDURE — 87077 CULTURE AEROBIC IDENTIFY: CPT | Performed by: HOSPITALIST

## 2024-05-09 PROCEDURE — 85730 THROMBOPLASTIN TIME PARTIAL: CPT

## 2024-05-09 PROCEDURE — 63600000 HC DRUGS/DETAIL CODE: Performed by: HOSPITALIST

## 2024-05-09 RX ORDER — OXYCODONE HYDROCHLORIDE 5 MG/1
5 TABLET ORAL EVERY 4 HOURS PRN
Status: DISCONTINUED | OUTPATIENT
Start: 2024-05-09 | End: 2024-05-13 | Stop reason: HOSPADM

## 2024-05-09 RX ORDER — MORPHINE SULFATE 2 MG/ML
2 INJECTION, SOLUTION INTRAMUSCULAR; INTRAVENOUS EVERY 4 HOURS PRN
Status: DISCONTINUED | OUTPATIENT
Start: 2024-05-09 | End: 2024-05-13 | Stop reason: HOSPADM

## 2024-05-09 RX ORDER — IBUPROFEN 200 MG
16-32 TABLET ORAL AS NEEDED
Status: DISCONTINUED | OUTPATIENT
Start: 2024-05-09 | End: 2024-05-11

## 2024-05-09 RX ORDER — DIAZEPAM 2 MG/1
2 TABLET ORAL EVERY 8 HOURS PRN
Status: DISCONTINUED | OUTPATIENT
Start: 2024-05-09 | End: 2024-05-13 | Stop reason: HOSPADM

## 2024-05-09 RX ORDER — DIAZEPAM 10 MG/2ML
2.5 INJECTION INTRAMUSCULAR ONCE
Status: COMPLETED | OUTPATIENT
Start: 2024-05-09 | End: 2024-05-09

## 2024-05-09 RX ORDER — METOPROLOL SUCCINATE 25 MG/1
25 TABLET, EXTENDED RELEASE ORAL EVERY MORNING
Status: DISCONTINUED | OUTPATIENT
Start: 2024-05-09 | End: 2024-05-13 | Stop reason: HOSPADM

## 2024-05-09 RX ORDER — SODIUM CHLORIDE 9 MG/ML
INJECTION, SOLUTION INTRAVENOUS CONTINUOUS
Status: DISCONTINUED | OUTPATIENT
Start: 2024-05-09 | End: 2024-05-10

## 2024-05-09 RX ORDER — DEXTROSE 50 % IN WATER (D50W) INTRAVENOUS SYRINGE
25 AS NEEDED
Status: DISCONTINUED | OUTPATIENT
Start: 2024-05-09 | End: 2024-05-11

## 2024-05-09 RX ORDER — VALACYCLOVIR HYDROCHLORIDE 500 MG/1
500 TABLET, FILM COATED ORAL EVERY MORNING
Status: DISCONTINUED | OUTPATIENT
Start: 2024-05-09 | End: 2024-05-13 | Stop reason: HOSPADM

## 2024-05-09 RX ORDER — DEXTROSE 40 %
15-30 GEL (GRAM) ORAL AS NEEDED
Status: DISCONTINUED | OUTPATIENT
Start: 2024-05-09 | End: 2024-05-11

## 2024-05-09 RX ADMIN — SODIUM CHLORIDE: 9 INJECTION, SOLUTION INTRAVENOUS at 18:53

## 2024-05-09 RX ADMIN — OXYCODONE HYDROCHLORIDE 5 MG: 5 TABLET ORAL at 11:38

## 2024-05-09 RX ADMIN — METOPROLOL SUCCINATE 25 MG: 25 TABLET, FILM COATED, EXTENDED RELEASE ORAL at 09:21

## 2024-05-09 RX ADMIN — SODIUM CHLORIDE: 9 INJECTION, SOLUTION INTRAVENOUS at 05:56

## 2024-05-09 RX ADMIN — VALACYCLOVIR 500 MG: 500 TABLET, FILM COATED ORAL at 09:21

## 2024-05-09 RX ADMIN — DIAZEPAM 2.5 MG: 10 INJECTION, SOLUTION INTRAMUSCULAR; INTRAVENOUS at 01:58

## 2024-05-09 ASSESSMENT — COGNITIVE AND FUNCTIONAL STATUS - GENERAL
WALKING IN HOSPITAL ROOM: 1 - TOTAL
CLIMB 3 TO 5 STEPS WITH RAILING: 1 - TOTAL
CLIMB 3 TO 5 STEPS WITH RAILING: 1 - TOTAL
STANDING UP FROM CHAIR USING ARMS: 2 - A LOT
MOVING TO AND FROM BED TO CHAIR: 2 - A LOT
MOVING TO AND FROM BED TO CHAIR: 2 - A LOT
CLIMB 3 TO 5 STEPS WITH RAILING: 1 - TOTAL
STANDING UP FROM CHAIR USING ARMS: 2 - A LOT
WALKING IN HOSPITAL ROOM: 1 - TOTAL
WALKING IN HOSPITAL ROOM: 1 - TOTAL
MOVING TO AND FROM BED TO CHAIR: 2 - A LOT
STANDING UP FROM CHAIR USING ARMS: 2 - A LOT

## 2024-05-09 NOTE — ASSESSMENT & PLAN NOTE
5/9  Based on lab work today.  Recheck labs tomorrow.    5/10  WBCs have improved.  See separate section for anemia and thrombocytopenia.

## 2024-05-09 NOTE — ASSESSMENT & PLAN NOTE
- coronary calcium score in 2017:  Total coronary calcium score is 127.1. Total calcium volume is 104.1. Total number of coronary artery calcifications is 7. For a 84  year old Male, this corresponds to between the 25th and 50th percentile

## 2024-05-09 NOTE — PLAN OF CARE
Problem: Adult Inpatient Plan of Care  Goal: Plan of Care Review  Outcome: Progressing  Flowsheets (Taken 5/9/2024 1831)  Progress: no change  Outcome Evaluation: Pt aao x 4, [place], VSS, NSR 60's, NPO after midnight for possible sx, IVF's continued.  Plan of Care Reviewed With: patient   Plan of Care Review  Plan of Care Reviewed With: patient  Progress: no change  Outcome Evaluation: Pt aao x 4, [place], VSS, NSR 60's, NPO after midnight for possible sx, IVF's continued.

## 2024-05-09 NOTE — ASSESSMENT & PLAN NOTE
- Orthopedics consulted with recommendations for an MRI to determine need for surgical intervention  - Pain medicines ordered  - N.p.o. at midnight  - Cardiology c/s for pre-op eval given reported increased NIETO/unresponsive episode

## 2024-05-09 NOTE — PROGRESS NOTES
Notified by MRI that patient unable to have MRI at [place] due to a metal that he has. He would be able to be scanned at [place] or exton outpatient.   Discussed with Dr. Chan and will check CT right hip instead.  MRI order removed  Keep NPO until CT resulted to r/o extension of fracture into IT

## 2024-05-09 NOTE — ASSESSMENT & PLAN NOTE
- Currently managed with diet  - Glucose on arrival 178  - Last A1c in December of 7.5  - Will place on 4 times daily Accu-Cheks and as needed sliding scale insulin

## 2024-05-09 NOTE — ASSESSMENT & PLAN NOTE
- Currently in sinus rhythm with episodes of sinus bradycardia into the high 50s  - Monitor on telemetry  - Continue metoprolol. monitor for any significant bradycardia  - Hold Eliquis until orthopedic evaluation for possible surgical intervention

## 2024-05-09 NOTE — PROGRESS NOTES
Hospital Medicine Service -  Daily Progress Note       SUBJECTIVE   Interval History:   5/9  Patient complaining of pain in his hip.  No other complaints.  He was trying to help his wife, who was falling, to prevent her from hurting herself.  However, he landed on his side, and ended up fracturing his femur.     OBJECTIVE      Vital signs in last 24 hours:  Temp:  [35.9 °C (96.6 °F)-36.7 °C (98.1 °F)] 36.7 °C (98.1 °F)  Heart Rate:  [51-71] 62  Resp:  [13-19] 16  BP: (116-167)/(54-74) 154/66  No intake or output data in the 24 hours ending 05/09/24 1019    PHYSICAL EXAMINATION        Constitutional: Awake, alert.  Moderate distress.  HEMNT: Mucous membranes moist.  Head: Normocephalic, atraumatic.  Eyes: EOM normal.  Positive pallor seen.  Neck: Supple. No thyromegaly present.  Cardiovascular: Normal rate and regular rhythm. No murmur heard.  Pulmonary/Chest: Effort normal, breath sounds normal. No respiratory distress. No wheezes. No rales.  Abdominal: Soft. No tenderness. No rebound.  Musculoskeletal: No edema. Positive tenderness.  Neurological: Awake, alert, oriented x 3. No focal deficits. No obvious cranial nerve deficits.  Skin: Positive pallor. No rashes.     LINES, CATHETERS, DRAINS, AIRWAYS, AND WOUNDS   Lines, Drains, and Airways:  Wounds (agree with documentation and present on admission):  Peripheral IV (Adult) 05/08/24 Anterior;Left Forearm (Active)   Number of days: 1            LABS / IMAGING / TELE      Labs  BMP Results         05/09/24 05/08/24 11/02/23     0515 1840 0620     136 140    K 4.3 4.2 4.1    Cl 109 106 112    CO2 26 23 24    Glucose 149 178 143    BUN 25 28 26    Creatinine 0.8 0.9 0.9    Calcium 8.7 9.0 8.3    Anion Gap 5 7 4    EGFR >60.0 >60.0 >60.0           Comment for K at 1840 on 05/08/24: Results obtained on plasma. Plasma Potassium values may be up to 0.4 mEQ/L less than serum values. The differences may be greater for patients with high platelet or white cell  counts.    Comment for EGFR at 0515 on 05/09/24: Calculation based on the Chronic Kidney Disease Epidemiology Collaboration (CKD-EPI) equation refit without adjustment for race.    Comment for EGFR at 1840 on 05/08/24: Calculation based on the Chronic Kidney Disease Epidemiology Collaboration (CKD-EPI) equation refit without adjustment for race.            CBC Results         05/09/24 05/08/24 11/02/23     0515 1840 0620    WBC 3.76 7.11 3.68    RBC 3.03 3.46 3.12    HGB 9.7 11.1 10.1    HCT 29.3 33.0 30.8    MCV 96.7 95.4 98.7    MCH 32.0 32.1 32.4    MCHC 33.1 33.6 32.8     152 112              Lab work reviewed by myself      Imaging  Transthoracic Echo (TTE) Complete   Final Result      X-RAY FEMUR RIGHT 2+ VIEW   Final Result   IMPRESSION:  Closed, acute, displaced fracture through the base of the greater   trochanter.      COMPARISON: Right hip and pelvis 5/8/2024.      COMMENT:  AP and lateral views of the right femur again show a fracture through   the base of the greater trochanter, with proximal retraction of the fracture   fragment. The right femur is otherwise intact and normally articulated at the   knee.      X-RAY CHEST 1 VIEW   Final Result   IMPRESSION:  No definite evidence of active cardiopulmonary disease.      COMPARISON: Chest studies dating from August 2015 through October 2023.      COMMENT:  AP upright imaging of the chest is completed at 2010 hours. Lordotic   positioning noted.   Lung fields are grossly clear allowing for projection. No visible pleural fluid   or air.   Cardiac size is similar to earlier studies allowing for differences in   projection.. Normal pulmonary vascularity. Normal hilar and mediastinal   contours.   The imaged upper abdomen is unremarkable.   The bony thorax is grossly intact as demonstrated.      X-RAY HIP WITH OR WITHOUT PELVIS 2-3 VW RIGHT   Final Result   IMPRESSION:  There is an acute, distracted fracture through the base of the   right greater  trochanter.      COMPARISON: CT abdomen/pelvis 10/31/2023.      COMMENT:  An AP pelvis, AP and frog lateral views of the right hip are   submitted.   The bony pelvis and sacrum appear intact as indicated.   Intact proximal left hip.   Multilevel degenerative disc changes.      **There is a fracture through the base of the greater trochanter, with proximal   retraction of the fracture fragment. Findings support an avulsion injury. The   right femoral neck and lesser of trochanter intact. There is no visible fracture   line extending to the trochanteric region.   Surgical clips in the pelvis consistent with prior prostatectomy. Penile   prosthesis.      CT HEAD WITHOUT IV CONTRAST   Final Result   IMPRESSION:   No acute intracranial abnormality.            ECG 12 lead         MRI PELVIS WITHOUT CONTRAST    (Results Pending)       Radiology reviewed by myself    ECG/Telemetry  Sinus rhythm seen on telemetry.    ASSESSMENT AND PLAN      * Closed fracture of greater trochanter of right femur (CMS/HCC)  Assessment & Plan  - Orthopedics consulted with recommendations for an MRI to determine need for surgical intervention  - Pain medicines ordered  - N.p.o. at midnight  - Cardiology c/s for pre-op eval given reported increased NIETO/unresponsive episode     5/9  Nonsyncopal fall, patient was trying to save his wife, and ended up falling himself.  Appreciate orthopedic input.  Plan for MRI to look at extent of the fracture.  Cardiology consulted for preoperative clearance.    Pancytopenia (CMS/HCC)  Assessment & Plan  5/9  Based on lab work today.  Recheck labs tomorrow.    ABLA (acute blood loss anemia)  Assessment & Plan  5/9  Even prior to any intervention, the patient has lost some blood.  Monitor hemoglobin closely.    CAD (coronary artery disease)  Assessment & Plan  - coronary calcium score in 2017:  Total coronary calcium score is 127.1. Total calcium volume is 104.1. Total number of coronary artery calcifications is  7. For a 84  year old Male, this corresponds to between the 25th and 50th percentile    Type 2 diabetes mellitus, without long-term current use of insulin (CMS/[facility])  Assessment & Plan  - Currently managed with diet  - Glucose on arrival 178  - Last A1c in  7.5  - Will place on 4 times daily Accu-Cheks and as needed sliding scale insulin      Diet controlled at this time.  Continue Accu-Cheks and sliding scale.    Paroxysmal atrial fibrillation (CMS/[facility])  Assessment & Plan  - Currently in sinus rhythm with episodes of sinus bradycardia into the high 50s  - Monitor on telemetry  - Continue metoprolol. monitor for any significant bradycardia  - Hold Eliquis until orthopedic evaluation for possible surgical intervention      Continue metoprolol.    Still in sinus rhythm.  Holding Eliquis in anticipation for possible orthopedic intervention.         VTE Assessment: [place] VTE Score: 6  VTE Prophylaxis:  Current anticoagulants:  [Provider Managed Hold] apixaban (ELIQUIS) tablet 5 mg, oral, BID      Code Status: Full Code      Estimated Discharge Date: 5/10/2024   Disposition [place]/9  Right femur greater trochanter fracture.  Appreciate orthopedic input.  Plan is for MRI of the hip today to look at the extent.  Cardiology to evaluate the patient for preoperative clearance.    Total Time Spent in Patient's Care:  More than 50 minutes were spent at the time of this dictation, which included: reviewing HPI, PMHx, Social Hx, Fhx, VTE prophylaxis, medications, allergies, pertinent diagnostic studies in electronic medical record since this hospitalization; time spent in evaluating the patient and documenting in the medical record. Reviewed plan of care with nursing personnel. Time included counseling, coordinating care, discussing progress, prognosis and further plan of care with the patient.     Ancelmo Gutierrez MD  2024

## 2024-05-09 NOTE — H&P
Hospital Medicine Service -  History & Physical        CHIEF COMPLAINT   Unresponsive episode     HISTORY OF PRESENT ILLNESS      Jose Fuentes is a 89 y.o. male with a past medical history of nonobstructive coronary disease, exercise-induced ventricular tachycardia, atrial fibrillation on Eliquis, chronic thrombocytopenia, diabetes type 2 who presented to the emergency room from home via ambulance after an unresponsive episode.  Prior to this episode the patient was at a store with his wife when she started to fall and he tried to grab her.  They both ended up falling on the ground.  He landed on his right hip.  He denies any head trauma or loss of consciousness.  He was able to get back up and ambulate.  They went home and he states that he fell asleep on a chair.  He states he was very tired and has been for a few days now.  His wife tried to arouse him and could not and she called EMS.  The patient apparently woke up within 1 to 2 minutes and does remember EMS responding.  He is currently at his baseline.  He denies any chest discomfort.  He does note dyspnea on exertion that has developed since a cruise in January where he developed a viral pneumonia.  Since then he has not been feeling well.  During that infection he did have some atrial fibrillation that was caught via his smart watch.  He required an increased dose of metoprolol but has since come back down to his regular dose of metoprolol succinate 25 mg daily.  He continues to have generalized fatigue, poor p.o. intake.  He notes feeling lightheaded at times.  He denies any further bouts of atrial fibrillation per his smart watch.  He denies any significant bradycardia.  In the emergency room hip x-ray imaging shows a greater trochanteric fracture on the right.     1) Hypertension   2) Hyperlipidemia with target LDL less than 70  3) Ventricular tachycardia - exercise induced - Exercise induced   4) CAD (coronary artery disease) - mild luminals on  cath  5) Right-sided sensorineural hearing loss - Viral; MRI 2016  6) Coronary calcium score 127.1 (25-50th percentile) - 1/2017 Coronary Calcium Score 121  7) Diabetes mellitus type 2, controlled, without complications (CMS-HCC) - Results 11/19/2019 7/27/2020 Hgb A1C 6.3 (H) 6.5 (H)   8) PAF (paroxysmal atrial fibrillation) (CMS-HCC)  9) Thrombocytopenia (CMS-HCC)  10) Prostate cancer       PAST MEDICAL AND SURGICAL HISTORY        PCP: Grace Stroud MD    MEDICATIONS      Prior to Admission medications    Medication Sig Start Date End Date Taking? Authorizing Provider   apixaban (ELIQUIS) 5 mg tablet Take 5 mg by mouth 2 (two) times a day.   Yes ProviderShavonne MD   metoprolol succinate XL (TOPROL-XL) 25 mg 24 hr tablet Take 25 mg by mouth every morning.   Yes ProviderShavonne MD   mv-min/folic/K1/lycopen/lutein (CENTRUM SILVER MEN ORAL) Take 1 tablet by mouth every morning.   Yes ProviderShavonne MD   valACYclovir (VALTREX) 500 mg tablet Take 500 mg by mouth every morning. 1/4/22  Yes Paula Manley MD   docusate sodium (COLACE) 100 mg capsule Take 2 capsules (200 mg total) by mouth daily. 11/3/23 12/3/23  London Alicea MD       ALLERGIES      Sulfa (sulfonamide antibiotics), Ace inhibitors, and Niacin    FAMILY HISTORY      History reviewed. No pertinent family history.    SOCIAL HISTORY      Social History     Socioeconomic History    Marital status:      Spouse name: None    Number of children: None    Years of education: None    Highest education level: None   Tobacco Use    Smoking status: Never    Smokeless tobacco: Never   Vaping Use    Vaping Use: Never used   Substance and Sexual Activity    Alcohol use: Yes     Comment: socially    Drug use: Never    Sexual activity: Defer     Social Determinants of Health     Food Insecurity: No Food Insecurity (5/8/2024)    Hunger Vital Sign     Worried About Running Out of Food in the Last Year: Never true     Ran Out of  Food in the Last Year: Never true   Transportation Needs: No Transportation Needs (10/31/2023)    PRAPARE - Transportation     Lack of Transportation (Medical): No     Lack of Transportation (Non-Medical): No       REVIEW OF SYSTEMS      All other systems reviewed and negative except as noted in HPI    PHYSICAL EXAMINATION      Temp:  [35.9 °C (96.6 °F)] 35.9 °C (96.6 °F)  Heart Rate:  [51-57] 57  Resp:  [13-14] 13  BP: (153-163)/(67-70) 163/67  Body mass index is 26.41 kg/m².    Physical Exam  Constitutional:       Appearance: Normal appearance. He is normal weight.   HENT:      Head: Normocephalic and atraumatic.      Nose: Nose normal.      Mouth/Throat:      Mouth: Mucous membranes are moist.      Pharynx: Oropharynx is clear.   Eyes:      General: No scleral icterus.        Right eye: No discharge.         Left eye: No discharge.      Conjunctiva/sclera: Conjunctivae normal.   Cardiovascular:      Rate and Rhythm: Normal rate and regular rhythm.      Pulses: Normal pulses.      Heart sounds: Normal heart sounds.   Pulmonary:      Effort: Pulmonary effort is normal. No respiratory distress.      Breath sounds: Normal breath sounds. No wheezing.   Abdominal:      General: Abdomen is flat. Bowel sounds are normal.      Palpations: Abdomen is soft.   Musculoskeletal:      Right lower leg: No edema.      Left lower leg: No edema.   Skin:     Findings: Bruising present.      Comments: Some mild bruising over R hip but no significant swelling    Neurological:      General: No focal deficit present.      Mental Status: He is alert. Mental status is at baseline.   Psychiatric:         Mood and Affect: Mood normal.         Behavior: Behavior normal.         Thought Content: Thought content normal.         Judgment: Judgment normal.         LABS / IMAGING / EKG        Labs  CBC Results         05/08/24 11/02/23 11/01/23     1840 0620 0403    WBC 7.11 3.68 4.76    RBC 3.46 3.12 3.35    HGB 11.1 10.1 10.6    HCT 33.0 30.8  32.2    MCV 95.4 98.7 96.1    MCH 32.1 32.4 31.6    MCHC 33.6 32.8 32.9     112 113          CMP Results         05/08/24 11/02/23 11/01/23     1840 0620 0403     140 139    K 4.2 4.1 4.1    Cl 106 112 110    CO2 23 24 23    Glucose 178 143 133    BUN 28 26 30    Creatinine 0.9 0.9 0.9    Calcium 9.0 8.3 8.5    Anion Gap 7 4 6    AST 16 -- --    ALT 15 -- --    Albumin 3.7 -- --    EGFR >60.0 >60.0 >60.0           Comment for K at 1840 on 05/08/24: Results obtained on plasma. Plasma Potassium values may be up to 0.4 mEQ/L less than serum values. The differences may be greater for patients with high platelet or white cell counts.    Comment for EGFR at 1840 on 05/08/24: Calculation based on the Chronic Kidney Disease Epidemiology Collaboration (CKD-EPI) equation refit without adjustment for race.              Imaging  CTH:  CT DOSE:  One or more dose reduction techniques (e.g. automated exposure  control, adjustment of the mA and/or kV according to patient size, use of  iterative reconstruction technique) utilized for this examination.     There is no evidence of acute intracranial hemorrhage, midline shift, or mass  effect.  There is no evidence of territorial infarction.  There is no  extra-axial collection.  There is periventricular and deep white matter  hypodensity, similar to previous study, which is compatible with chronic small  vessel ischemic disease.. There is no evidence of hydrocephalus..  --  IMPRESSION:  No acute intracranial abnormality.         ECG/Telemetry  I have independently reviewed his EKG which shows sinus rhythm, no significant ST changes.  No Q waves.  QT is mildly prolonged at 43.    ASSESSMENT AND PLAN           Episode of unresponsiveness  Assessment & Plan  - Etiology unclear but the episode was short lived.  The patient did not have a syncopal episode as initially described by the ED team.  Rather he was sleeping in a chair and was unresponsive for 1 to 2 minutes.  - Labs  are unremarkable with normal glucose  - Patient with some mild bradycardia at times but is on metoprolol and his bradycardia does not appear significant enough to cause this issue.  However will monitor on telemetry.  Troponin has been normal x 2.  - He reports increasing fatigue and dyspnea on exertion over the last several months.  Will check an echocardiogram.  - He reports increasing malaise specifically over the last several days.  Will check a UA and viral studies.    Closed fracture of greater trochanter of right femur (CMS/HCC)  Assessment & Plan  - Orthopedics consulted with recommendations for an MRI to determine need for surgical intervention  - Pain medicines ordered  - N.p.o. at midnight  - Cardiology c/s for pre-op eval given reported increased NIETO/unresponsive episode     CAD (coronary artery disease)  Assessment & Plan  - coronary calcium score in 2017:  Total coronary calcium score is 127.1. Total calcium volume is 104.1. Total number of coronary artery calcifications is 7. For a 84  year old Male, this corresponds to between the 25th and 50th percentile    Type 2 diabetes mellitus, without long-term current use of insulin (CMS/[facility])  Assessment & Plan  - Currently managed with diet  - Glucose on arrival 178  - Last A1c in December of 7.5  - Will place on 4 times daily Accu-Cheks and as needed sliding scale insulin    Paroxysmal atrial fibrillation (CMS/[facility])  Assessment & Plan  - Currently in sinus rhythm with episodes of sinus bradycardia into the high 50s  - Monitor on telemetry  - Continue metoprolol. monitor for any significant bradycardia  - Hold Eliquis until orthopedic evaluation for possible surgical intervention         VTE Assessment: Padua  6  VTE Prophylaxis: holding eliquis. Restart when able   Code Status: FULL code-confirmed       Disposition Planning: pending clinical course      Miranda Antonio MD  5/8/2024

## 2024-05-09 NOTE — PROGRESS NOTES
Patient: Jose Fuentes  Location: [facility] 3A 3008  MRN:  [id number]  Today's date:  5/9/2024    Attempted to see patient for therapy. Unable due to medical hold (Patient currently with strict bed rest orders in. Per ortho may need surgical Intervention. OT to follow.).

## 2024-05-09 NOTE — CONSULTS
REASON FOR CONSULT: preop, unresponsive episode, dyspnea    CONSULT FROM: Ancelmo Gutierrez MD    PRIMARY CARDIOLOGIST: Dr. Hendrix ([place])    ------------------------------------------------------------------------------------------------------------------------------------------  HISTORY OF PRESENTING ILLNESS  ------------------------------------------------------------------------------------------------------------------------------------------  Jose Fuentes is a 89 y.o. male who is admitted with right hip fracture    # cardiac risk factors: age, HTN, HLD, prediabetes  # no CHF  # mild CAD - .1 (2017)  # Paroxysmal atrial fibrillation (7/2023)  # Ventricular tachycardia - exercise induced   # Atherosclerosis of left carotid artery     Nuclear stress test 2019: Stress ECG positive for ischemic changes at sub-maximal heart rate with normal myocardial perfusion and normal left ventricular systolic function. Failure to achieve target heart rate limits the sensitivity of this test to detect exercise induced ischemia.     Echo 5/9/24: EF 70%. Ddx1. Mildly dilated left atrium. Trace AR. Trace MR. Trace TR.    At baseline, the patient denies exertional chest pain, shortness of breath, orthopnea, PND, ankle edema, palpitations, or syncope.     The patient is now admitted with hip fracture. Patient had mechanical fall yesterday when he tried to catch his wife who was falling. He landed on right hip but was able to get up. He fell asleep when they got home and his wife was unable to wake him up for 1-2 minutes. Patient denies syncope.    He reports exertional dyspnea since pneumonia diagnosis in January which has not worsened since that time. No chest pain, dizziness, palpitations, or syncope. Patient was evaluated in ED and found to have right greater trochanteric fracture. Cardiology consulted for preop risk assessment. Patient admits to 5/10 discomfort in right hip. Denies any chest pain. EKG nonischemic.  Troponin negative x 2. No arrhythmias on monitor.    ------------------------------------------------------------------------------------------------------------------------------------------  PAST MEDICAL HISTORY  ------------------------------------------------------------------------------------------------------------------------------------------  Past Medical History:   Diagnosis Date    CAD (coronary artery disease)     Hx of ventricular tachycardia     Prostate cancer (CMS/HCC)      History reviewed. No pertinent surgical history.    ------------------------------------------------------------------------------------------------------------------------------------------  MEDICATIONS  ------------------------------------------------------------------------------------------------------------------------------------------  Home medications      apixaban, Take 5 mg by mouth 2 (two) times a day.    metoprolol succinate XL, Take 25 mg by mouth every morning.    mv-min/folic/K1/lycopen/lutein (CENTRUM SILVER MEN ORAL), Take 1 tablet by mouth every morning.    valACYclovir, Take 500 mg by mouth every morning.    docusate sodium, Take 2 capsules (200 mg total) by mouth daily.    Inpatient Medications      [Provider Managed Hold] apixaban, 5 mg, oral, BID    glucose, 16-32 g of dextrose, oral, PRN **OR** dextrose, 15-30 g of dextrose, oral, PRN **OR** glucagon, 1 mg, intramuscular, PRN **OR** dextrose 50 % in water (D50), 25 mL, intravenous, PRN    diazePAM, 2 mg, oral, q8h PRN    metoprolol succinate XL, 25 mg, oral, q AM    oxyCODONE, 5 mg, oral, q4h PRN **AND** morphine, 2 mg, intravenous, q4h PRN    sodium chloride 0.9 %, , intravenous, Continuous    valACYclovir, 500 mg, oral, q  AM    ------------------------------------------------------------------------------------------------------------------------------------------  ALLERGIES  ------------------------------------------------------------------------------------------------------------------------------------------  Sulfa (sulfonamide antibiotics), Ace inhibitors, and Niacin    ------------------------------------------------------------------------------------------------------------------------------------------  SOCIAL HISTORY  ------------------------------------------------------------------------------------------------------------------------------------------  Never smoker  Social alcohol  No drugs    ------------------------------------------------------------------------------------------------------------------------------------------  FAMILY HISTORY  ------------------------------------------------------------------------------------------------------------------------------------------  No premature CAD    ------------------------------------------------------------------------------------------------------------------------------------------  REVIEW OF SYSTEMS  ------------------------------------------------------------------------------------------------------------------------------------------  Constitutional: - fever, - chills, - weakness, - weight loss  HEENT: - blurred vision, - sore throat, - hoarseness  Respiratory: - dyspnea, - cough, - hemoptysis  Cardiovascular: - chest pain, - dyspnea, - orthopnea, - PND, - edema, - palpitations, - syncope  Gastrointestinal: - nausea, - vomiting, - diarrhea, - hematemesis, - melena  Genitourinary: - dysuria, - frequency  Integument: - rash, - itching  Hematologic/lymphatic:  - bruising, - petechiae  Musculoskeletal: - arthalgias, - myalgias  Neurological: - vertigo, - tremors, - headache, - speech deficit, - focal weakness  Behavioral/Psych: - anxiety, - depression  Endocrine:  - cold intolerance, - heat intolerance, - weight change    ------------------------------------------------------------------------------------------------------------------------------------------  PHYSICAL EXAM  ------------------------------------------------------------------------------------------------------------------------------------------  VITAL SIGNS:  Temp:  [35.9 °C (96.6 °F)-36.7 °C (98.1 °F)] 36.7 °C (98.1 °F)  Heart Rate:  [51-71] 62  Resp:  [13-19] 16  BP: (116-167)/(54-74) 154/66  SaO2: 100%  No intake or output data in the 24 hours ending 05/09/24 0911    PHYSICAL EXAM:  General appearance: alert and cooperative  Head: without obvious abnormality  Eyes: PERRLA, extraocular movements intact  Neck: No JVD, carotid bruits, thyromegaly  Lungs: clear to auscultation bilaterally, no crackles or wheezing  Heart: regular rate and rhythm, S1-S2 normal, no murmurs, clicks, rubs or gallops  Abdomen: soft, non-tender, bowel sounds normal  Extremities: no edema, peripheral pulses present  Skin: Skin color, texture, turgor normal. No rashes or lesions  Neurologic: Alert and oriented X 3, no focal deficits    ------------------------------------------------------------------------------------------------------------------------------------------  LABS / IMAGING / EKG / TELEMETRY  ------------------------------------------------------------------------------------------------------------------------------------------  LABS:  Results from last 7 days   Lab Units 05/09/24 0515 05/08/24 2101 05/08/24  1840   SODIUM mEQ/L 140  --  136   POTASSIUM mEQ/L 4.3  --  4.2   CHLORIDE mEQ/L 109*  --  106   CO2 mEQ/L 26  --  23   BUN mg/dL 25  --  28*   CREATININE mg/dL 0.8  --  0.9   AST IU/L  --   --  16   ALT IU/L  --   --  15   HIGH SENSITIVE TROPONIN I pg/mL  --  3.2 3.4     Results from last 7 days   Lab Units 05/09/24 0515 05/08/24  1840   WBC K/uL 3.76* 7.11   HEMOGLOBIN g/dL 9.7* 11.1*   HEMATOCRIT % 29.3* 33.0*  "  PLATELETS K/uL 109* 152     Lab Results   Component Value Date    HGBA1C 7.5 (H) 12/07/2023    TSH 0.77 05/08/2024     No results found for: \"CHOL\", \"LDLCALC\", \"HDL\", \"TRIG\"  No results found for: \"BNP\"    IMAGING:  Right hip XR: Closed, acute, displaced fracture through the base of the greater trochanter.    CXR: NAD. Lung fields are grossly clear allowing for projection. No visible pleural fluid  or air. Cardiac size is similar to earlier studies allowing for differences in projection.    ECG: NSR, rate 63, normal UT, narrow QRS, no ischemic ST changes    TELEMETRY: NSR, no events      ------------------------------------------------------------------------------------------------------------------------------------------  ASSESSMENT AND PLAN  ------------------------------------------------------------------------------------------------------------------------------------------    1) Preop evaluation: history of mild CAD. He is off statin and aspirin per his preference and PCP recommendation given age. No chest pain. EKG nonischemic. Patient is active without change in the past few month. No cardiac contraindication to planned hip surgery. Eliquis held prior to surgery and should be restarted as soon as able per surgeon.    2) Unresponsive episode: patient reports sleeping in a chair and his wife was unable to wake him up for 1-2 minutes. History inconsistent with true syncope. Labs WNL. No arrhythmias on monitor. TTE with preserved EF, mild diastolic dysfunction and mild valvular disease.    3) HTN: BP elevated. Continue metoprolol and pain management.    4) PAF: no recent episodes. Eliquis held temporarily for OR.          MAITE Patel  5/9/2024    Primary Care Doctor: Grace Stroud MD    "

## 2024-05-09 NOTE — CONSULTS
Orthopedic Consult Note    Subjective     Jose Fuentes is a 89 y.o. male with history of CAD, paroxysmal atrial fibrillation on Eliquis who presents after a syncopal fall earlier today. Denies any head injury. Endorsing right hip pain. Denies any numbness, tingling, other injuries. Orthopedics was consulted for evaluation and recommendations.     Medical History:   Past Medical History:   Diagnosis Date    CAD (coronary artery disease)     Hx of ventricular tachycardia     Prostate cancer (CMS/HCC)        Surgical History: History reviewed. No pertinent surgical history.    Social History:   Social History     Social History Narrative    Not on file       Family History: Family history non-contributory.    Allergies: Sulfa (sulfonamide antibiotics), Ace inhibitors, and Niacin    No current facility-administered medications for this encounter.        Medication List        ASK your doctor about these medications      apixaban 5 mg tablet  Commonly known as: ELIQUIS  Take 5 mg by mouth 2 (two) times a day.  Dose: 5 mg     CENTRUM SILVER MEN ORAL  Take 1 tablet by mouth every morning.  Dose: 1 tablet     docusate sodium 100 mg capsule  Commonly known as: COLACE  Take 2 capsules (200 mg total) by mouth daily.  Dose: 200 mg     metoprolol succinate XL 25 mg 24 hr tablet  Commonly known as: TOPROL-XL  Take 25 mg by mouth every morning.  Dose: 25 mg     valACYclovir 500 mg tablet  Commonly known as: VALTREX  Take 500 mg by mouth every morning.  Dose: 500 mg            Review of Systems  All other systems reviewed and negative except as noted in the HPI.    Objective     Imaging  Xray right hip demonstrates displaced greater trochanter fracture    Physical Exam  Gen: awake and alert, no acute distress  HEENT: normocephalic, atraumatic  Cards: regular rate, bcr  Pulm: no increased work of breathing, no audible wheezing    LUE:    Skin intact  No deformity  NTTP at wrist elbow and shoulder  No crepitus with ROM,  Nonpainful P/A ROM wrist elbow shoulder  Sensation intact axillary rad ulnar median  Distal motor intact AIN PIN ulnar  +2 radial pulse  Cap refill < 2 secs    RUE:   Skin intact  No deformity  NTTP at wrist elbow and shoulder  No crepitus with ROM, Nonpainful P/A ROM wrist elbow shoulder  Sensation intact axillary rad ulnar median  Distal motor intact AIN PIN ulnar  +2 radial pulse  Cap refill < 2 secs              LLE:  Skin intact  No deformity  NTTP at ankle and knee  No pain log roll of hip  No crepitus with ROM, Nonpainful P/A ROM ankle knee hip  Sensation intact saph, anny, sper, dper, tib  Tib ant, EHL, FHL, gastrocsoleus, peroneals motor intact  +2 DP/PT pulse  Cap refill < 2 secs    RLE:   Skin intact  No deformity  NTTP at ankle and knee, over greater trochanter  No pain log roll of hip, heel strike  Pain with adduction/abduction of RLE  Sensation intact saph, anny, sper, dper, tib  Tib ant, EHL, FHL, gastrocsoleus, peroneals motor intact  +2 DP/PT pulse  Cap refill < 2 secs        Assessment   89 y.o. male presenting with right greater trochanter fracture after mechanical fall     Plan   -Will need MRI to rule out occult intertrochanteric extension of fracture  -NPO at midnight pending MRI  -Bedrest  -Pain control prn  -Appreciate preoperative medical risk stratification in case patient requires surgical fixation  -Please hold eliquis  -Rest of care per primary      I personally examined the patient and performed a comprehensive physical exam after reviewing the radiographs and CT scan of the right hip which demonstrates a right greater trochanteric avulsion fracture without extension into the intertrochanteric region.    I discussed options for treatment for this fracture and answered patient questions.  This will not require surgical treatment.  The plan will be for gait training with physical therapy at 50% weightbearing on the right lower extremity he will need skilled nursing facility placement versus  home with physical therapy.  I would like to see him in the office in 4 weeks for follow-up radiographs and reevaluation

## 2024-05-09 NOTE — ASSESSMENT & PLAN NOTE
- Etiology unclear but the episode was short lived.  The patient did not have a syncopal episode as initially described by the ED team.  Rather he was sleeping in a chair and was unresponsive for 1 to 2 minutes.  - Labs are unremarkable with normal glucose  - Patient with some mild bradycardia at times but is on metoprolol and his bradycardia does not appear significant enough to cause this issue.  However will monitor on telemetry.  Troponin has been normal x 2.  - He reports increasing fatigue and dyspnea on exertion over the last several months.  Will check an echocardiogram.  - He reports increasing malaise specifically over the last several days.  Will check a UA and viral studies.

## 2024-05-09 NOTE — ASSESSMENT & PLAN NOTE
- coronary calcium score in 2017:  Total coronary calcium score is 127.1. Total calcium volume is 104.1. Total number of coronary artery calcifications is 7  Stable  Continue current medications
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
## 2024-05-09 NOTE — PROGRESS NOTES
Patient: Jose Fuentes  Location: [facility] 3A 3008  MRN:  [id number]  Today's date:  5/9/2024    Attempted to see patient for therapy. Unable due to medical hold (Patient currently with strict bed rest orders in. Per ortho may need surgical Intervention. PT to follow.).

## 2024-05-09 NOTE — PLAN OF CARE
Care Coordination Admission Assessment Note    General Information:  Readmission Within the last 30 days: no previous admission in last 30 days  Does patient have a : No  Patient-Specific Goals (include timeframe): Return home with spouse where he previously resdied.    Living Arrangements:  Arrived From: home  Current Living Arrangements: home  People in Home: spouse  Home Accessibility: stairs to enter home (Group), stairs within home (Group)  Living Arrangement Comments: Patient resides in [address] [place] [address] with 3 jimena. Patient and spouse in process of relocating to new home with first floor living.    Housing Stability and Utility Access (SDOH):  In the last 12 months, was there a time when you were not able to pay the mortgage or rent on time?: No  In the last 12 months, how many places have you lived?: 1  In the last 12 months, was there a time when you did not have a steady place to sleep or slept in a shelter (including now)?: No  In the past 12 months has the electric, gas, oil, or water company threatened to shut off services in your home?: No    Functional Status Prior to Admission:   Assistive Device/Animal Currently Used at Home: none  Functional Status Comments: I with ADLs  IADL Comments: I with IADLs has cleaning service     Supports and Services:  Current Outpatient/Agency/Support Group: none  Type of Current Home Care Services:    History of home care episode or rehab stay: none    Discharge Needs Assessment:   Concerns to be Addressed: discharge planning  Current Discharge Risk: physical impairment  Anticipated Changes Related to Illness:      Patient/Family Anticipated Discharge Plan:  Patient/Family Anticipates Transition To: home with family, skilled nursing facility, inpatient rehabilitation facility  Patient/Family Anticipated Services at Transition: skilled nursing, home health care, rehabilitation services    Connection to Community  Patient declined offered  resources.    Patient Choice:   Offered/Gave Vendor List: no  Patient's Choice of Community Agency(s):         Anticipated Discharge Plan:  Met with patient. Provided education and contact information for Care Coordination services.: yes  Anticipated Discharge Disposition: home with home health, skilled nursing facility, acute rehab/Inpatient Rehab Facility     Transportation Needs (SDOH):  Transportation Concerns: no car  Transportation Anticipated: car, drives self  Is Out of Hospital DNR needed at discharge?: no    In the past 12 months, has lack of transportation kept you from medical appointments or from getting medications?: No  In the past 12 months, has lack of transportation kept you from meetings, work, or from getting things needed for daily living?: No    Concerns - comments: MSW CC met with patient who is laying in bed. MSW CC reviewed cc role, confirmed demographics, pcp, and pharmacy. MSW CC reviewed discharge planning process, and patient's goals for discharge. Patient hopeful to return home with spouse where he previously resided. Patient resides in [address] [facility] with 3 SIOBHAN and full flight to bedroom and bathroom. Patient and spouse in process moving to home with first floor set-up. Patient I with ADLs and IADLs and driving prior to fall and hospitalization.  Patient awaiting MRI of Pelvis to determine next steps for treatment. CC will assist with discharge planning.

## 2024-05-09 NOTE — ASSESSMENT & PLAN NOTE
Due to fracture  Hgb has been stable past few days back on eliquis  Can repeat cbc in a few days at snf

## 2024-05-10 LAB
ABO + RH BLD: NORMAL
ANION GAP SERPL CALC-SCNC: 5 MEQ/L (ref 3–15)
APTT PPP: 32 SEC (ref 23–35)
ATRIAL RATE: 63
BASOPHILS # BLD: 0 K/UL (ref 0.01–0.1)
BASOPHILS NFR BLD: 0 %
BLD GP AB SCN SERPL QL: NEGATIVE
BUN SERPL-MCNC: 22 MG/DL (ref 7–25)
CALCIUM SERPL-MCNC: 8.3 MG/DL (ref 8.6–10.3)
CHLORIDE SERPL-SCNC: 107 MEQ/L (ref 98–107)
CO2 SERPL-SCNC: 26 MEQ/L (ref 21–31)
CREAT SERPL-MCNC: 0.8 MG/DL (ref 0.7–1.3)
D AG BLD QL: POSITIVE
DIFFERENTIAL METHOD BLD: ABNORMAL
EGFRCR SERPLBLD CKD-EPI 2021: >60 ML/MIN/1.73M*2
EOSINOPHIL # BLD: 0.04 K/UL (ref 0.04–0.54)
EOSINOPHIL NFR BLD: 0.9 %
ERYTHROCYTE [DISTWIDTH] IN BLOOD BY AUTOMATED COUNT: 12.8 % (ref 11.6–14.4)
GLUCOSE SERPL-MCNC: 137 MG/DL (ref 70–99)
HCT VFR BLD AUTO: 28.2 % (ref 40.1–51)
HGB BLD-MCNC: 9.4 G/DL (ref 13.7–17.5)
IMM GRANULOCYTES # BLD AUTO: 0.01 K/UL (ref 0–0.08)
IMM GRANULOCYTES NFR BLD AUTO: 0.2 %
INR PPP: 1.2
LABORATORY COMMENT REPORT: NORMAL
LABORATORY COMMENT REPORT: NORMAL
LYMPHOCYTES # BLD: 0.82 K/UL (ref 1.2–3.5)
LYMPHOCYTES NFR BLD: 19.2 %
MAGNESIUM SERPL-MCNC: 1.8 MG/DL (ref 1.8–2.5)
MCH RBC QN AUTO: 32 PG (ref 28–33.2)
MCHC RBC AUTO-ENTMCNC: 33.3 G/DL (ref 32.2–36.5)
MCV RBC AUTO: 95.9 FL (ref 83–98)
MONOCYTES # BLD: 0.56 K/UL (ref 0.3–1)
MONOCYTES NFR BLD: 13.1 %
NEUTROPHILS # BLD: 2.83 K/UL (ref 1.7–7)
NEUTS SEG NFR BLD: 66.6 %
NRBC BLD-RTO: 0 %
P AXIS: 44
PDW BLD AUTO: 11 FL (ref 9.4–12.4)
PLATELET # BLD AUTO: 116 K/UL (ref 150–350)
POTASSIUM SERPL-SCNC: 4.3 MEQ/L (ref 3.5–5.1)
PR INTERVAL: 174
PROTHROMBIN TIME: 15.4 SEC (ref 12.2–14.5)
QRS DURATION: 90
QT INTERVAL: 472
QTC CALCULATION(BAZETT): 483
R AXIS: 19
RBC # BLD AUTO: 2.94 M/UL (ref 4.5–5.8)
SODIUM SERPL-SCNC: 138 MEQ/L (ref 136–145)
SPECIMEN EXP DATE BLD: NORMAL
T WAVE AXIS: 41
VENTRICULAR RATE: 63
WBC # BLD AUTO: 4.26 K/UL (ref 3.8–10.5)

## 2024-05-10 PROCEDURE — 63700000 HC SELF-ADMINISTRABLE DRUG: Performed by: HOSPITALIST

## 2024-05-10 PROCEDURE — 97530 THERAPEUTIC ACTIVITIES: CPT | Mod: GP

## 2024-05-10 PROCEDURE — 20600000 HC ROOM AND CARE INTERMEDIATE/TELEMETRY

## 2024-05-10 PROCEDURE — 99233 SBSQ HOSP IP/OBS HIGH 50: CPT | Performed by: INTERNAL MEDICINE

## 2024-05-10 PROCEDURE — 97162 PT EVAL MOD COMPLEX 30 MIN: CPT | Mod: GP

## 2024-05-10 PROCEDURE — 97530 THERAPEUTIC ACTIVITIES: CPT | Mod: GO

## 2024-05-10 PROCEDURE — 63700000 HC SELF-ADMINISTRABLE DRUG: Performed by: NURSE PRACTITIONER

## 2024-05-10 PROCEDURE — 85025 COMPLETE CBC W/AUTO DIFF WBC: CPT | Performed by: INTERNAL MEDICINE

## 2024-05-10 PROCEDURE — 97166 OT EVAL MOD COMPLEX 45 MIN: CPT | Mod: GO

## 2024-05-10 PROCEDURE — 36415 COLL VENOUS BLD VENIPUNCTURE: CPT | Performed by: INTERNAL MEDICINE

## 2024-05-10 PROCEDURE — 80048 BASIC METABOLIC PNL TOTAL CA: CPT | Performed by: INTERNAL MEDICINE

## 2024-05-10 PROCEDURE — 83735 ASSAY OF MAGNESIUM: CPT | Performed by: INTERNAL MEDICINE

## 2024-05-10 RX ADMIN — OXYCODONE HYDROCHLORIDE 5 MG: 5 TABLET ORAL at 04:08

## 2024-05-10 RX ADMIN — OXYCODONE HYDROCHLORIDE 5 MG: 5 TABLET ORAL at 23:37

## 2024-05-10 RX ADMIN — METOPROLOL SUCCINATE 25 MG: 25 TABLET, FILM COATED, EXTENDED RELEASE ORAL at 08:43

## 2024-05-10 RX ADMIN — APIXABAN 5 MG: 5 TABLET, FILM COATED ORAL at 21:31

## 2024-05-10 RX ADMIN — VALACYCLOVIR 500 MG: 500 TABLET, FILM COATED ORAL at 08:43

## 2024-05-10 RX ADMIN — APIXABAN 5 MG: 5 TABLET, FILM COATED ORAL at 10:12

## 2024-05-10 RX ADMIN — OXYCODONE HYDROCHLORIDE 5 MG: 5 TABLET ORAL at 12:39

## 2024-05-10 ASSESSMENT — COGNITIVE AND FUNCTIONAL STATUS - GENERAL
HELP NEEDED FOR PERSONAL GROOMING: 4 - NONE
TOILETING: 2 - A LOT
WALKING IN HOSPITAL ROOM: 3 - A LITTLE
DRESSING REGULAR LOWER BODY CLOTHING: 1 - TOTAL
CLIMB 3 TO 5 STEPS WITH RAILING: 1 - TOTAL
EATING MEALS: 4 - NONE
AFFECT: WFL
MOVING TO AND FROM BED TO CHAIR: 2 - A LOT
WALKING IN HOSPITAL ROOM: 3 - A LITTLE
AFFECT: WFL
WALKING IN HOSPITAL ROOM: 1 - TOTAL
MOVING TO AND FROM BED TO CHAIR: 3 - A LITTLE
CLIMB 3 TO 5 STEPS WITH RAILING: 2 - A LOT
DRESSING REGULAR UPPER BODY CLOTHING: 4 - NONE
STANDING UP FROM CHAIR USING ARMS: 3 - A LITTLE
CLIMB 3 TO 5 STEPS WITH RAILING: 2 - A LOT
STANDING UP FROM CHAIR USING ARMS: 2 - A LOT
HELP NEEDED FOR BATHING: 1 - TOTAL
MOVING TO AND FROM BED TO CHAIR: 3 - A LITTLE
STANDING UP FROM CHAIR USING ARMS: 3 - A LITTLE

## 2024-05-10 NOTE — PLAN OF CARE
Plan of Care Review  Plan of Care Reviewed With: patient  Progress: improving  Outcome Evaluation: Pt aao x 4, [place], VSS, NSR 60's, surgery cancelled, 50% weight bearing status LLE, OOB w/ walker and ambulated in halls x 1 assist w/ PT, pain 2-5/10 relieved by Oxycodone PRN, for possible d/c tomorrow.

## 2024-05-10 NOTE — PLAN OF CARE
Care Coordination Discharge Plan Note     Discharge Needs Assessment  Concerns to be Addressed: discharge planning  Current Discharge Risk: physical impairment    Anticipated Discharge Plan  Anticipated Discharge Disposition: skilled nursing facility  Type of Skilled Nursing Care Services: OT, PT, nursing      Patient Choice  Offered/Gave Vendor List: yes  Patient's Choice of Community Agency(s):      Patient and/or patient guardian/advocate was made aware of their right to choose a provider. A list of eligible providers was presented and reviewed with the patient and/or patient guardian/advocate in written and/or verbal form. The list delineates providers in the patient’s desired geographic area who are participating in the Medicare program and/or providers contracted with the patient’s primary insurance. The Medicare list and quality ratings were obtained from the Medicare.gov [medicare.gov] website.    ---------------------------------------------------------------------------------------------------------------------    Interdisciplinary Discharge Plan Review:  Participants:patient, family/lay caregiver, physician, , nursing    Concerns Comments: Patient reviewed in rounds. Patient not in need of surgery for right femur fx. Patient seen by therapy and recommended for SNF. PAPITO SWANSON met with patient and his spouse. PAPITO CC provided support and education on SNF. MSMARK CC provided SNF list and patient and spouse requested referrals be sent to [facility]. Referrals sent via [facility]. CC will continue to assist with discharg eplanning.    Discharge Plan:   Disposition/Destination: Skilled Nursing Facility - Other  / Skilled Nursing Facility  Discharge Facility:    Community Resources:      Discharge Transportation:  Is Out of Hospital DNR needed at Discharge: no  Does patient need discharge transport? Yes

## 2024-05-10 NOTE — PLAN OF CARE
Problem: Adult Inpatient Plan of Care  Goal: Plan of Care Review  Outcome: Progressing  Flowsheets (Taken 5/10/2024 8805)  Progress: improving  Outcome Evaluation: OT IE completed  Plan of Care Reviewed With: patient     Problem: Self-Care Deficit  Goal: Improved Ability to Complete Activities of Daily Living  Outcome: Progressing

## 2024-05-10 NOTE — PROGRESS NOTES
"Awaiting operative intervention for right hip fracture.    Please see detailed cardiac preprocedural consultation from yesterday 5/9/24.    Resting comfortably.    Stable hemodynamics.  No supplemental oxygen requirement.    Telemetry reviewed, overall unremarkable.    1) preop cardiac clearance-acceptable cardiac risk for planned orthopedic intervention. Morbidity/mortality extremely high if patient remains in bed following hip fracture.  Troponin negative despite physiologic stress of trauma.  No concerning cardiac review of systems. Notably he has mild exertional dyspnea that has steadily improved since pneumonia diagnosis and recovery in January.  Lungs clear on exam, no supplemental oxygen requirement.  EKG nonischemic.     2) questionable unresponsive episode prior to arrival-the patient insists that he was simply sleeping in a chair when his wife was \"unable to wake him.\"  This history does not seem consistent with syncope.  No concerning arrhythmias on monitor thus far.  Preserved ejection fraction on recent echo.  Troponin negative.  The patient should not drive until outpatient follow-up with his primary cardiologist.     3) hypertension-blood pressure noted however patient at times experiencing significant hip discomfort when moved in bed.  Will continue to monitor.  At times systolic blood pressures below 120.     4) PAF-no recent episodes, Eliquis on hold for OR.    Our team will see the patient on Saturday 5/11/24, or sooner if necessary.  "
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
## 2024-05-10 NOTE — DISCHARGE INSTRUCTIONS
ORTHOPEDIC DISCHARGE INSTRUCTIONS  -Imaging has revealed a fracture of your right greater trochanter  -This is being treated conservatively without surgery at this time  -Maintain 50% to right leg until cleared to advance weight bearing by Dr. Chan  -Call now for an appointment with Dr. Chan in 3-4 weeks, [phone]

## 2024-05-10 NOTE — PROGRESS NOTES
CT reviewed by Dr. Chan>no extension of greater troch fracture into IT so can be managed conservatively  50%WB RLE  Diet ordered  Eliquis unheld  PT/OT, pain control
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
## 2024-05-10 NOTE — PLAN OF CARE
Problem: Adult Inpatient Plan of Care  Goal: Plan of Care Review  Outcome: Progressing  Flowsheets (Taken 5/10/2024 1517)  Progress: improving  Outcome Evaluation: PT eval complete  Plan of Care Reviewed With: patient

## 2024-05-10 NOTE — PROGRESS NOTES
Patient: Jose Fuentes  Location: [facility] 3A 3008  MRN:  [id number]  Today's date:  5/10/2024    Attempted to see patient for therapy. Unable due to medical hold (pt with strict bedrest orders in place, may require surgical intervention. OT will need new orders post-op, will continue to follow).
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
## 2024-05-10 NOTE — PLAN OF CARE
Problem: Adult Inpatient Plan of Care  Goal: Plan of Care Review  Outcome: Progressing  Flowsheets (Taken 5/10/2024 0314)  Progress: no change  Outcome Evaluation: PT NPO after midnight  maintained,  plan for R femur open reduction internal fixation.  Plan of Care Reviewed With: patient

## 2024-05-11 PROBLEM — I95.1 ORTHOSTASIS: Status: ACTIVE | Noted: 2024-05-11

## 2024-05-11 PROBLEM — R82.71 ASYMPTOMATIC BACTERIURIA: Status: ACTIVE | Noted: 2024-05-11

## 2024-05-11 PROCEDURE — 99232 SBSQ HOSP IP/OBS MODERATE 35: CPT | Performed by: INTERNAL MEDICINE

## 2024-05-11 PROCEDURE — 20600000 HC ROOM AND CARE INTERMEDIATE/TELEMETRY

## 2024-05-11 PROCEDURE — 63700000 HC SELF-ADMINISTRABLE DRUG: Performed by: NURSE PRACTITIONER

## 2024-05-11 PROCEDURE — 63700000 HC SELF-ADMINISTRABLE DRUG: Performed by: HOSPITALIST

## 2024-05-11 PROCEDURE — 97530 THERAPEUTIC ACTIVITIES: CPT | Mod: GO

## 2024-05-11 PROCEDURE — 25800000 HC PHARMACY IV SOLUTIONS: Performed by: INTERNAL MEDICINE

## 2024-05-11 PROCEDURE — 63700000 HC SELF-ADMINISTRABLE DRUG: Performed by: INTERNAL MEDICINE

## 2024-05-11 PROCEDURE — 97535 SELF CARE MNGMENT TRAINING: CPT | Mod: GO

## 2024-05-11 RX ORDER — POLYETHYLENE GLYCOL 3350 17 G/17G
17 POWDER, FOR SOLUTION ORAL DAILY
Status: DISCONTINUED | OUTPATIENT
Start: 2024-05-11 | End: 2024-05-13 | Stop reason: HOSPADM

## 2024-05-11 RX ORDER — SODIUM CHLORIDE 9 MG/ML
INJECTION, SOLUTION INTRAVENOUS CONTINUOUS
Status: DISCONTINUED | OUTPATIENT
Start: 2024-05-11 | End: 2024-05-12

## 2024-05-11 RX ORDER — SENNOSIDES 8.6 MG/1
2 TABLET ORAL 2 TIMES DAILY
Status: DISCONTINUED | OUTPATIENT
Start: 2024-05-11 | End: 2024-05-13 | Stop reason: HOSPADM

## 2024-05-11 RX ADMIN — METOPROLOL SUCCINATE 25 MG: 25 TABLET, FILM COATED, EXTENDED RELEASE ORAL at 08:35

## 2024-05-11 RX ADMIN — SODIUM CHLORIDE: 9 INJECTION, SOLUTION INTRAVENOUS at 10:55

## 2024-05-11 RX ADMIN — VALACYCLOVIR 500 MG: 500 TABLET, FILM COATED ORAL at 08:35

## 2024-05-11 RX ADMIN — APIXABAN 5 MG: 5 TABLET, FILM COATED ORAL at 08:35

## 2024-05-11 RX ADMIN — POLYETHYLENE GLYCOL 3350 17 G: 17 POWDER, FOR SOLUTION ORAL at 21:50

## 2024-05-11 RX ADMIN — OXYCODONE HYDROCHLORIDE 5 MG: 5 TABLET ORAL at 17:17

## 2024-05-11 RX ADMIN — SENNOSIDES 2 TABLET: 8.6 TABLET, FILM COATED ORAL at 21:50

## 2024-05-11 RX ADMIN — SODIUM CHLORIDE: 9 INJECTION, SOLUTION INTRAVENOUS at 23:41

## 2024-05-11 RX ADMIN — APIXABAN 5 MG: 5 TABLET, FILM COATED ORAL at 21:50

## 2024-05-11 ASSESSMENT — COGNITIVE AND FUNCTIONAL STATUS - GENERAL
HELP NEEDED FOR PERSONAL GROOMING: 4 - NONE
CLIMB 3 TO 5 STEPS WITH RAILING: 2 - A LOT
CLIMB 3 TO 5 STEPS WITH RAILING: 2 - A LOT
DRESSING REGULAR UPPER BODY CLOTHING: 3 - A LITTLE
STANDING UP FROM CHAIR USING ARMS: 3 - A LITTLE
EATING MEALS: 4 - NONE
AFFECT: WFL
TOILETING: 2 - A LOT
HELP NEEDED FOR BATHING: 2 - A LOT
MOVING TO AND FROM BED TO CHAIR: 3 - A LITTLE
STANDING UP FROM CHAIR USING ARMS: 3 - A LITTLE
WALKING IN HOSPITAL ROOM: 3 - A LITTLE
WALKING IN HOSPITAL ROOM: 3 - A LITTLE
MOVING TO AND FROM BED TO CHAIR: 3 - A LITTLE
DRESSING REGULAR LOWER BODY CLOTHING: 1 - TOTAL

## 2024-05-11 NOTE — PROGRESS NOTES
SUBJECTIVE     Interval history: Greater trochanteric fracture to be managed operatively per orthopedic notes 5/10/2024.    Review of systems: Patient denies chest pain, shortness of breath, orthopnea, lightheadedness, or palpitations.  Adequate pain control.  Patient is sitting up comfortably in the bedside chair playing Scrabble presumably with his daughter.    Evaluated today in the company of family.    Discussed with nursing, no specific cardiovascular concerns reported.    OBJECTIVE     VITAL SIGNS:  Temp:  [36.7 °C (98.1 °F)-37.3 °C (99.1 °F)] 36.7 °C (98.1 °F)  Heart Rate:  [62-80] 77  Resp:  [16-18] 18  BP: (120-170)/(57-71) 129/63  SPO2 97%    Intake/Output Summary (Last 24 hours) at 5/11/2024 1015  Last data filed at 5/11/2024 0400  Gross per 24 hour   Intake 240 ml   Output 550 ml   Net -310 ml     PHYSICAL EXAM:  General Appearance: No apparent distress.  Cooperative.  Neck: No JVD  Lungs: Clear to auscultation bilaterally  Heart: Regular S1 and S2 without new / changing murmur, gallop or rub  Abdomen: Bowel sounds are present.  Extremities: No significant peripheral edema is appreciated.  Skin: Warm.  Neurologic: Alert and oriented.    LABS / IMAGING / EKG / TELEMETRY     LABS:  Results from last 7 days   Lab Units 05/10/24  0332 05/09/24  0515 05/08/24  1840   SODIUM mEQ/L 138 140 136   POTASSIUM mEQ/L 4.3 4.3 4.2   MAGNESIUM mg/dL 1.8  --   --    CALCIUM mg/dL 8.3* 8.7 9.0   CHLORIDE mEQ/L 107 109* 106   CO2 mEQ/L 26 26 23   BUN mg/dL 22 25 28*   CREATININE mg/dL 0.8 0.8 0.9   AST IU/L  --   --  16   ALT IU/L  --   --  15     Results from last 7 days   Lab Units 05/10/24  0332 05/09/24  2359 05/09/24  0515 05/08/24  1840   WBC K/uL 4.26  --  3.76* 7.11   HEMOGLOBIN g/dL 9.4*  --  9.7* 11.1*   HEMATOCRIT % 28.2*  --  29.3* 33.0*   PLATELETS K/uL 116*  --  109* 152   INR   --  1.2  --   --      Lab Results   Component Value Date    HGBA1C 7.5 (H) 12/07/2023    TSH 0.77 05/08/2024     No results  "found for: \"CHOL\", \"LDLCALC\", \"HDL\", \"TRIG\"  No results found for: \"BNP\"  Results from last 7 days   Lab Units 05/08/24  2101 05/08/24  1840   HIGH SENSITIVE TROPONIN I pg/mL 3.2 3.4       TELEMETRY:  Sinus rhythm, short taz of nonsustained PAT otherwise no sustained tachyarrhythmia or bradyarrhythmia    Cardiac Imaging    TRANSTHORACIC ECHO (TTE) COMPLETE 05/09/2024    Interpretation Summary    Left Ventricle: Normal ventricle size. Normal wall thickness. Estimated EF 70%. Grade I diastolic dysfunction.    Right Ventricle: Normal ventricle size. Normal systolic function.    Left Atrium: Mildly dilated atrium.    Right Atrium: Normal sized atrium.    Aortic Valve: Tricuspid valve.  Sclerotic leaflets. Trace regurgitation. No stenosis.    Mitral Valve: Sclerotic mitral valve. Trace regurgitation.    Tricuspid Valve: Structure is grossly normal. Jet is insufficient to calculate pulmonary artery pressure. Trace regurgitation.    Pulmonic Valve: Valve not well visualized. Grossly normal structure.    Aorta: Aortic root grossly normal. Sinuses of Valsalva grossly normal. Ascending aorta grossly normal.    IVC/SVC: Normal sized inferior vena cava. Inferior vena cava demonstrates normal respiratory collapse.    Pericardium: Normal structure. No evidence of pericardial effusion.      MEDICATIONS         apixaban  5 mg oral BID    metoprolol succinate XL  25 mg oral q AM    valACYclovir  500 mg oral q AM       ASSESSMENT AND PLAN     In summary, the patient is a 89-year-old female admitted for evaluation and management of mechanical fall without syncope and right hip fracture.  He was evaluated by Dr. Escoto who saw patient for Dr. Marin who had previously followed this patient.    The patient has a history of mild to moderate coronary calcification, paroxysmal atrial fibrillation, use of systemic anticoagulation with apixaban 5 mg twice daily, exercise-induced ventricular tachycardia, atherosclerosis of left carotid " artery, hypertension, hyperlipidemia and prediabetes.    CV status appears stable and compensated.  Patient denies symptoms or signs of myocardial ischemia, cerebrovascular ischemia, congestive heart failure or dysrhythmia.  On examination he is euvolemic with no adventitious cardiopulmonary findings.  Blood pressures are labile however given the circumstances of increased physiologic stress from pain, trauma and hospitalization, reasonably well-controlled.  I have made no alterations in the patient's cardiovascular medical regimen at the time of this encounter nor have I ordered any new/additional cardiac testing.    The patient and family are pleased that the management of the right intertrochanteric fracture is nonoperative.  Continue management per orthopedics as well as optimizing pain management.    At this time cardiology will sign off and see again if requested.  Please reconsult to see again or call with questions.  Outpatient follow-up with his primary cardiologist is recommended.    Thank you for involving our group in the patient's care    Arnulfo Russo MD  5/11/2024    Primary Care Doctor: Grace Stroud MD

## 2024-05-11 NOTE — ASSESSMENT & PLAN NOTE
Patient has a urethral sphincter.  Hence, patient has asymptomatic bacteriuria.  Patient is asymptomatic.  No antibiotics needed at this time.
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
## 2024-05-12 LAB
ANION GAP SERPL CALC-SCNC: 4 MEQ/L (ref 3–15)
BACTERIA UR CULT: ABNORMAL
BASOPHILS # BLD: 0 K/UL (ref 0.01–0.1)
BASOPHILS NFR BLD: 0 %
BUN SERPL-MCNC: 17 MG/DL (ref 7–25)
CALCIUM SERPL-MCNC: 8.1 MG/DL (ref 8.6–10.3)
CHLORIDE SERPL-SCNC: 106 MEQ/L (ref 98–107)
CO2 SERPL-SCNC: 27 MEQ/L (ref 21–31)
CREAT SERPL-MCNC: 0.7 MG/DL (ref 0.7–1.3)
DIFFERENTIAL METHOD BLD: ABNORMAL
EGFRCR SERPLBLD CKD-EPI 2021: >60 ML/MIN/1.73M*2
EOSINOPHIL # BLD: 0.07 K/UL (ref 0.04–0.54)
EOSINOPHIL NFR BLD: 1.7 %
ERYTHROCYTE [DISTWIDTH] IN BLOOD BY AUTOMATED COUNT: 12.8 % (ref 11.6–14.4)
GLUCOSE SERPL-MCNC: 154 MG/DL (ref 70–99)
HCT VFR BLD AUTO: 27.9 % (ref 40.1–51)
HGB BLD-MCNC: 9.6 G/DL (ref 13.7–17.5)
IMM GRANULOCYTES # BLD AUTO: 0.02 K/UL (ref 0–0.08)
IMM GRANULOCYTES NFR BLD AUTO: 0.5 %
LYMPHOCYTES # BLD: 0.89 K/UL (ref 1.2–3.5)
LYMPHOCYTES NFR BLD: 22.2 %
MAGNESIUM SERPL-MCNC: 1.7 MG/DL (ref 1.8–2.5)
MCH RBC QN AUTO: 32.2 PG (ref 28–33.2)
MCHC RBC AUTO-ENTMCNC: 34.4 G/DL (ref 32.2–36.5)
MCV RBC AUTO: 93.6 FL (ref 83–98)
MONOCYTES # BLD: 0.46 K/UL (ref 0.3–1)
MONOCYTES NFR BLD: 11.5 %
NEUTROPHILS # BLD: 2.57 K/UL (ref 1.7–7)
NEUTS SEG NFR BLD: 64.1 %
NRBC BLD-RTO: 0 %
PDW BLD AUTO: 10.9 FL (ref 9.4–12.4)
PLATELET # BLD AUTO: 113 K/UL (ref 150–350)
POTASSIUM SERPL-SCNC: 4.1 MEQ/L (ref 3.5–5.1)
RBC # BLD AUTO: 2.98 M/UL (ref 4.5–5.8)
SODIUM SERPL-SCNC: 137 MEQ/L (ref 136–145)
WBC # BLD AUTO: 4.01 K/UL (ref 3.8–10.5)

## 2024-05-12 PROCEDURE — 99232 SBSQ HOSP IP/OBS MODERATE 35: CPT | Performed by: INTERNAL MEDICINE

## 2024-05-12 PROCEDURE — 63700000 HC SELF-ADMINISTRABLE DRUG

## 2024-05-12 PROCEDURE — 63700000 HC SELF-ADMINISTRABLE DRUG: Performed by: HOSPITALIST

## 2024-05-12 PROCEDURE — 20600000 HC ROOM AND CARE INTERMEDIATE/TELEMETRY

## 2024-05-12 PROCEDURE — 85025 COMPLETE CBC W/AUTO DIFF WBC: CPT | Performed by: INTERNAL MEDICINE

## 2024-05-12 PROCEDURE — 83735 ASSAY OF MAGNESIUM: CPT | Performed by: INTERNAL MEDICINE

## 2024-05-12 PROCEDURE — 82310 ASSAY OF CALCIUM: CPT | Performed by: INTERNAL MEDICINE

## 2024-05-12 PROCEDURE — 36415 COLL VENOUS BLD VENIPUNCTURE: CPT | Performed by: INTERNAL MEDICINE

## 2024-05-12 PROCEDURE — 63700000 HC SELF-ADMINISTRABLE DRUG: Performed by: INTERNAL MEDICINE

## 2024-05-12 PROCEDURE — 63700000 HC SELF-ADMINISTRABLE DRUG: Performed by: NURSE PRACTITIONER

## 2024-05-12 RX ORDER — ACETAMINOPHEN 325 MG/1
650 TABLET ORAL EVERY 6 HOURS PRN
Status: DISCONTINUED | OUTPATIENT
Start: 2024-05-12 | End: 2024-05-13 | Stop reason: HOSPADM

## 2024-05-12 RX ADMIN — APIXABAN 5 MG: 5 TABLET, FILM COATED ORAL at 20:47

## 2024-05-12 RX ADMIN — METOPROLOL SUCCINATE 25 MG: 25 TABLET, FILM COATED, EXTENDED RELEASE ORAL at 09:26

## 2024-05-12 RX ADMIN — VALACYCLOVIR 500 MG: 500 TABLET, FILM COATED ORAL at 09:25

## 2024-05-12 RX ADMIN — ACETAMINOPHEN 650 MG: 325 TABLET, FILM COATED ORAL at 23:46

## 2024-05-12 RX ADMIN — OXYCODONE HYDROCHLORIDE 5 MG: 5 TABLET ORAL at 15:03

## 2024-05-12 RX ADMIN — SENNOSIDES 2 TABLET: 8.6 TABLET, FILM COATED ORAL at 09:25

## 2024-05-12 RX ADMIN — OXYCODONE HYDROCHLORIDE 5 MG: 5 TABLET ORAL at 06:58

## 2024-05-12 RX ADMIN — APIXABAN 5 MG: 5 TABLET, FILM COATED ORAL at 09:25

## 2024-05-12 RX ADMIN — POLYETHYLENE GLYCOL 3350 17 G: 17 POWDER, FOR SOLUTION ORAL at 09:24

## 2024-05-12 ASSESSMENT — COGNITIVE AND FUNCTIONAL STATUS - GENERAL
WALKING IN HOSPITAL ROOM: 3 - A LITTLE
MOVING TO AND FROM BED TO CHAIR: 3 - A LITTLE
CLIMB 3 TO 5 STEPS WITH RAILING: 2 - A LOT
CLIMB 3 TO 5 STEPS WITH RAILING: 2 - A LOT
WALKING IN HOSPITAL ROOM: 3 - A LITTLE
MOVING TO AND FROM BED TO CHAIR: 3 - A LITTLE
STANDING UP FROM CHAIR USING ARMS: 3 - A LITTLE
STANDING UP FROM CHAIR USING ARMS: 3 - A LITTLE
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
## 2024-05-13 VITALS
HEART RATE: 68 BPM | DIASTOLIC BLOOD PRESSURE: 47 MMHG | TEMPERATURE: 97.4 F | OXYGEN SATURATION: 94 % | SYSTOLIC BLOOD PRESSURE: 104 MMHG | HEIGHT: 72 IN | BODY MASS INDEX: 25.89 KG/M2 | WEIGHT: 191.14 LBS | RESPIRATION RATE: 16 BRPM

## 2024-05-13 PROBLEM — D61.818 PANCYTOPENIA (CMS/HCC): Status: RESOLVED | Noted: 2024-05-09 | Resolved: 2024-05-13

## 2024-05-13 PROCEDURE — 97530 THERAPEUTIC ACTIVITIES: CPT | Mod: GP

## 2024-05-13 PROCEDURE — 63700000 HC SELF-ADMINISTRABLE DRUG: Performed by: NURSE PRACTITIONER

## 2024-05-13 PROCEDURE — 97535 SELF CARE MNGMENT TRAINING: CPT | Mod: GO

## 2024-05-13 PROCEDURE — 63700000 HC SELF-ADMINISTRABLE DRUG: Performed by: HOSPITALIST

## 2024-05-13 PROCEDURE — 63700000 HC SELF-ADMINISTRABLE DRUG

## 2024-05-13 PROCEDURE — 63700000 HC SELF-ADMINISTRABLE DRUG: Performed by: INTERNAL MEDICINE

## 2024-05-13 PROCEDURE — 99239 HOSP IP/OBS DSCHRG MGMT >30: CPT | Performed by: STUDENT IN AN ORGANIZED HEALTH CARE EDUCATION/TRAINING PROGRAM

## 2024-05-13 RX ORDER — POLYETHYLENE GLYCOL 3350 17 G/17G
17 POWDER, FOR SOLUTION ORAL DAILY
Start: 2024-05-14 | End: 2024-05-17

## 2024-05-13 RX ORDER — AMOXICILLIN 250 MG
2 CAPSULE ORAL EVERY 12 HOURS
Start: 2024-05-13 | End: 2024-05-20

## 2024-05-13 RX ORDER — ACETAMINOPHEN 500 MG
1000 TABLET ORAL EVERY 8 HOURS
Start: 2024-05-13 | End: 2024-05-27

## 2024-05-13 RX ORDER — OXYCODONE HYDROCHLORIDE 5 MG/1
5 TABLET ORAL EVERY 6 HOURS PRN
Qty: 28 TABLET | Refills: 0 | Status: SHIPPED | OUTPATIENT
Start: 2024-05-13 | End: 2024-05-20

## 2024-05-13 RX ADMIN — METOPROLOL SUCCINATE 25 MG: 25 TABLET, FILM COATED, EXTENDED RELEASE ORAL at 08:44

## 2024-05-13 RX ADMIN — VALACYCLOVIR 500 MG: 500 TABLET, FILM COATED ORAL at 08:39

## 2024-05-13 RX ADMIN — ACETAMINOPHEN 650 MG: 325 TABLET, FILM COATED ORAL at 08:39

## 2024-05-13 RX ADMIN — APIXABAN 5 MG: 5 TABLET, FILM COATED ORAL at 08:41

## 2024-05-13 RX ADMIN — SENNOSIDES 2 TABLET: 8.6 TABLET, FILM COATED ORAL at 08:39

## 2024-05-13 ASSESSMENT — COGNITIVE AND FUNCTIONAL STATUS - GENERAL
WALKING IN HOSPITAL ROOM: 3 - A LITTLE
DRESSING REGULAR UPPER BODY CLOTHING: 3 - A LITTLE
STANDING UP FROM CHAIR USING ARMS: 3 - A LITTLE
DRESSING REGULAR LOWER BODY CLOTHING: 2 - A LOT
MOVING TO AND FROM BED TO CHAIR: 3 - A LITTLE
AFFECT: WFL
STANDING UP FROM CHAIR USING ARMS: 3 - A LITTLE
MOVING TO AND FROM BED TO CHAIR: 3 - A LITTLE
HELP NEEDED FOR BATHING: 2 - A LOT
EATING MEALS: 4 - NONE
WALKING IN HOSPITAL ROOM: 3 - A LITTLE
AFFECT: WFL
CLIMB 3 TO 5 STEPS WITH RAILING: 2 - A LOT
TOILETING: 3 - A LITTLE
HELP NEEDED FOR PERSONAL GROOMING: 3 - A LITTLE
CLIMB 3 TO 5 STEPS WITH RAILING: 2 - A LOT

## 2024-05-13 NOTE — PLAN OF CARE
Care Coordination Discharge Plan Summary    Admission Assessment Summary    General Information  Readmission Within the last 30 days: no previous admission in last 30 days  Does patient have a : No  Patient-Specific Goals (include timeframe): Return home with spouse where he previously resdied.    Living Arrangements  Arrived From: home  Current Living Arrangements: home  People in Home: spouse  Home Accessibility: stairs to enter home (Group), stairs within home (Group)  Living Arrangement Comments: Patient resides in [address] [place] [address] with 3 jimena. Patient and spouse in process of relocating to new home with first floor living.    Social Determinants of Health - Screenings  Housing Stability  In the last 12 months, was there a time when you were not able to pay the mortgage or rent on time?: No  In the last 12 months, how many places have you lived?: 1  In the last 12 months, was there a time when you did not have a steady place to sleep or slept in a shelter (including now)?: No  Utility Access  In the past 12 months has the electric, gas, oil, or water company threatened to shut off services in your home?: No  Transportation Needs  In the past 12 months, has lack of transportation kept you from medical appointments or from getting medications?: No  In the past 12 months, has lack of transportation kept you from meetings, work, or from getting things needed for daily living?: No    Functional Status Prior to Admission  Assistive Device/Animal Currently Used at Home: grab bar  Functional Status Comments: I with ADLs  IADL Comments: I with IADLs has cleaning service    Discharge Needs Assessment    Concerns to be Addressed: discharge planning  Current Discharge Risk: physical impairment  Anticipated Changes Related to Illness: inability to care for self    Discharge Plan Summary    Patient Choice  Offered/Gave Vendor List: yes  Patient's Choice of Community Agency(s): [facility]  Patient and/or patient  guardian/advocate was made aware of their right to choose a provider. A list of eligible providers was presented and reviewed with the patient and/or patient guardian/advocate in written and/or verbal form. The list delineates providers in the patient’s desired geographic area who are participating in the Medicare program and/or providers contracted with the patient’s primary insurance. The Medicare list and quality ratings were obtained from the Medicare.gov [medicare.gov] website.    Concerns / Comments: Patient is medically stable for discharge today. Patient rec'ed for SNF,and accepted SNF bed at [facility]. PAPITO SWANSON met with patient and his daughter who is at bedside. They are both in agreement with discharge today. PAPITO SWANSON reviewed IMM with patient, which he signed.    Discharge Plan:  Disposition/Destination: Skilled Nursing Facility - Other  / Skilled Nursing Facility  Destination - Admitted Since 5/8/2024       Service Provider Selected Services Address Phone Fax Patient Preferred Last Updated    [facility] [address] [facility] [address] [phone] -- Brittany Warner MSW 5/13/2024 1131       Internal Comment last updated by Brittany Warner MSW 5/13/2024 1131    Report number [phone]                        Connection to Community  Patient declined offered resources.  Community Resources:      Discharge Transportation:  Is Out of Hospital DNR needed at Discharge: no  Does patient need discharge transport? Yes  Discharge Transportation Vendor: [employer] ([phone])  Type of Transportation: wheelchair van  What day is the transport expected?: 05/13/24  What time is the transport expected?: 3451    Care Coordinator explained the costs associated w/ [facility] transport (based on vendor, payment method, and current mileage rates). Pt expressed understanding and stated they are agreeable to paying for [facility] transport.
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
## 2024-05-13 NOTE — PROGRESS NOTES
Occupational Therapy -  Daily Treatment/Progress Note     Patient: Jose Fuentes  Location: [facility] 3A 3008  MRN: [id number]  Today's date: 5/13/2024    HISTORY OF PRESENT ILLNESS     Jose is a 89 y.o. male admitted on 5/8/2024 with NIETO (dyspnea on exertion) [R06.09]  Closed fracture of right hip, initial encounter (CMS/[facility]) [S72.001A]  Syncope, unspecified syncope type [R55]. Principal problem is Closed fracture of greater trochanter of right femur (CMS/[facility]).    Past Medical History  Jose has a past medical history of CAD (coronary artery disease), ventricular tachycardia, and Prostate cancer (CMS/[facility]).    History of Present Illness   Presented to the emergency room from home via ambulance after an unresponsive episode.  Prior to this episode the patient was at a store with his wife when she started to fall and he tried to grab her.  They both ended up falling on the ground.  He landed on his right hip.  He denies any head trauma or loss of consciousness.  He was able to get back up and ambulate.  They went home and he states that he fell asleep on a chair.  He states he was very tired and has been for a few days now.  His wife tried to arouse him and could not and she called EMS.  The patient apparently woke up within 1 to 2 minutes and does remember EMS responding.  He is currently at his baseline.  He denies any chest discomfort.  He does note dyspnea on exertion that has developed since a cruise in January where he developed a viral pneumonia.  Since then he has not been feeling well.    Xray Right Femur 5/8: Closed, acute, displaced fracture through the base of the greater trochanter      PRIOR LEVEL OF FUNCTION AND LIVING ENVIRONMENT     Prior Level of Function      Flowsheet Row Most Recent Value   Dominant Hand right   Ambulation independent   Transferring independent   Toileting independent   Bathing independent   Dressing independent   Eating independent   IADLs independent    Driving/Transportation    Prior Level of Function Comment IND w/ ADL and mobility   Assistive Device Currently Used at Home grab bar          Prior Living Environment      Flowsheet Row Most Recent Value   People in Home spouse   Current Living Arrangements home   Home Accessibility stairs to enter home (Group), stairs within home (Group)   Living Environment Comment Pt and spouse soon moving with no stairs. Currently live in multiple story [facility], 1+1+1 SIOBHAN. FF to bed/bath with WIS and tub, -SC +GB          Occupational Profile      Flowsheet Row Most Recent Value   Successful Occupations IND ADL   Occupational History/Life Experiences Enjoys traveling , has been to >90 countries   Environmental Supports and Barriers Supportive wife, also has health issues          VITALS AND PAIN     OT Vitals      Date/Time Pulse HR Source SpO2 Pt Activity O2 Therapy BP BP Location BP Method Pt Position [facility]   05/13/24 0911 72 Monitor 96 % At rest None (Room air) 174/77 Right upper arm Automatic Lying PER   05/13/24 0915 92 Monitor 94 % At rest None (Room air) 165/73 Right upper arm Automatic Sitting PER          OT Pain      Date/Time Pain Type Side/Orientation Location Rating: Rest Rating: Activity [facility]   05/13/24 0911 Pain Assessment right leg 0 2 PER                 Objective   OBJECTIVE     Start time:  0909  End time:  0927  Session Length: 18 min  Mode of Treatment: co-treatment, occupational therapy (Poor activity tolerance; scheduled for D/C later today)    General Observations  Patient received reclined, in bed. He was agreeable to therapy, no issues or concerns identified by nurse prior to session. No new complaints; aware of discharge today    Precautions: fall, weight bearing  Extremity Weight-Bearing Status: right lower extremity  Right LE Weight-Bearing Status: partial weight-bearing (PWB) (50% weight-bearing)    Limitations/Impairments: safety/cognitive      OT Eval and Treat - 05/13/24 6067           Cognition    Orientation Status oriented x 3     Affect/Mental Status WFL     Follows Commands follows one-step commands;75-90% accuracy;repetition of directions required     Cognitive Function executive function deficit;safety deficit     Executive Function Deficit minimal deficit;insight/awareness of deficits;self-monitoring/self-correction     Safety Deficit minimal deficit;safety precautions awareness;safety precautions follow-through/compliance     Comment, Cognition Pleasant, cooperative; aware of weight-bearing restrictions but requires ongoing cues for technique of implementation; appears to lack some insight into effects of deficits on safety        Bed Mobility    Bed Mobility Activities left;supine to sit     [place] close supervision     Safety/Cues increased time to complete     Assistive Device bed rails;head of bed elevated     Comment HOB = 20*        Mobility Belt    Mobility Belt Used for All Out of Bed Activity yes        Sit/Stand Transfer    Surface edge of bed;chair with arm rests     [place] minimum assist (75% or more patient effort)     Safety/Cues increased time to complete;minimal;verbal cues;maintaining precautions;technique     Assistive Device walker, front-wheeled     Transfer Comments From edge of bed, to/from low height toilet, to/from low height recliner w/ cues for safe hand placement        Toilet Transfer    Transfer Technique sit/stand     [place] minimum assist (75% or more patient effort)     Safety/Cues minimal;verbal cues;hand placement;sequencing;technique     Assistive Device grab bars/safety frame;walker, front-wheeled        Functional Mobility    Distance in room/bathroom;hallway     Functional Mobility [place] minimum assist (75% or more patient effort)     Safety/Cues increased time to complete;moderate;verbal cues;maintaining precautions;proper use of assistive device     Assistive Device walker, front-wheeled     Functional Mobility  Comments Facilitated by PT in room/hallway w/ RW and cues for integration of 50% RLE weight-bearing. Reports onset of lightheadedness while ambulating in hallway, requiring education on rationale behind returning to room for assessment of vitals, as patient requests to continue mobility        Lower Body Dressing    Tasks doff;don;socks     [place] moderate assist (50-74% patient effort)     Safety/Cues increased time to complete;minimal;verbal cues;compensatory techniques     Position supported sitting     Adaptive Equipment none     Comment Achieves LLE crossed-leg technique and full distal LE reach; limited participation/reach of RLE due to pain        Grooming    Tasks washes, rinses and dries hands     [place] close supervision     Position unsupported standing;sink side     Adaptive Equipment none        Toileting    Tasks adjust/manage clothing;perform bowel hygiene     [place] minimum assist (75% or more patient effort)     Position unsupported sitting;unsupported standing     Adaptive Equipment catheter, external        Balance    Static Sitting Balance WFL;sitting, edge of bed     Dynamic Sitting Balance WFL;sitting, edge of bed     Sit to Stand Dynamic Balance mild impairment;supported     Static Standing Balance WFL;supported     Dynamic Standing Balance mild impairment;supported     Comment, Balance Support = RW        Impairments/Safety Issues    Impairments Affecting Function balance;strength;pain;endurance/activity tolerance     Functional Endurance Improving; reports lightheadedness w/ activity - BP slightly elevated     Safety Issues Affecting Function insight into deficits/self-awareness;awareness of need for assistance;safety precautions follow-through/compliance                                       Session Outcome  Patient upright, in chair, leg(s) elevated at end of session, chair alarm on, all needs met, personal items in reach. Nursing notified about patient's performance,  patient's position, and patient's response to therapy/activity.    AM-PAC™ - ADL (Current Function)     Putting on/taking off regular lower body clothing 2 - A Lot   Bathing 2 - A Lot   Toileting 3 - A Little   Putting on/taking off regular upper body clothing 3 - A Little   Help for taking care of personal grooming 3 - A Little   Eating meals 4 - None   AM-PAC™ ADL Score 17      ASSESSMENT AND PLAN     Progress Summary  OT follow-up treatment completed. Jose is progressing well w/ therapy, although continues to have some difficulty integrating partial weight-bearing restrictions of RLE. He requires min (A) for sit >> stand transfers and ambulation w/ RW, mod (A) for LBD and min (A)/close supervision for sitting/standing grooming and toileting. He reports lightheadedness w/ activity although vitals are stable. Recommend SNF at discharge.    Patient/Family Therapy Goal Statement: open to rehab if needed    OT Plan      Flowsheet Row Most Recent Value   Rehab Potential good, to achieve stated therapy goals at 05/10/2024 1237   Therapy Frequency 4 times/wk at 05/10/2024 1237   Planned Therapy Interventions functional balance retraining, activity tolerance training, strengthening exercise, transfer/mobility retraining, BADL retraining, patient/caregiver education/training, occupation/activity based interventions, ROM/therapeutic exercise at 05/10/2024 1237            OT Discharge Recommendations      Flowsheet Row Most Recent Value   OT Recommended Discharge Disposition skilled nursing facility at 05/13/2024 1105   Anticipated Equipment Needs if Discharged Home (OT) walker, front-wheeled, shower chair, dressing equipment at 05/13/2024 1105                 OT Goals      Flowsheet Row Most Recent Value   Bed Mobility Goal 1    Activity/Assistive Device bed mobility activities, all at 05/10/2024 1237   [place] modified independence at 05/10/2024 1237   Time Frame by discharge at 05/10/2024 1237   Progress/Outcome  goal ongoing at 05/10/2024 1237   Transfer Goal 1    Activity/Assistive Device all transfers, walker, front-wheeled at 05/10/2024 1237   [place] modified independence at 05/10/2024 1237   Time Frame by discharge at 05/10/2024 1237   Progress/Outcome goal ongoing at 05/10/2024 1237   Dressing Goal 1    Activity/Adaptive Equipment lower body dressing at 05/10/2024 1237   [place] modified independence at 05/10/2024 1237   Time Frame by discharge at 05/10/2024 1237   Strategies/Barriers LB AE (?) at 05/10/2024 1237   Progress/Outcome goal ongoing at 05/10/2024 1237   Toileting Goal 1    Activity/Assistive Device toileting skills, all at 05/10/2024 1237   [place] modified independence at 05/10/2024 1237   Time Frame by discharge at 05/10/2024 1237   Progress/Outcome goal ongoing at 05/10/2024 1237

## 2024-05-13 NOTE — PLAN OF CARE
Plan of Care Review  Outcome Evaluation: Patient is AAOx4 and aware of pending discharge to [place] via wheelchair van. IV removed, and AVS reviewed with patient.

## 2024-05-13 NOTE — PROGRESS NOTES
Physical Therapy -  Daily Treatment/Progress Note     Patient: Jose Fuentes  Location: [facility] 3A 3008  MRN: [id number]  Today's date: 5/13/2024    HISTORY OF PRESENT ILLNESS     Jose is a 89 y.o. male admitted on 5/8/2024 with NIETO (dyspnea on exertion) [R06.09]  Closed fracture of right hip, initial encounter (CMS/[facility]) [S72.001A]  Syncope, unspecified syncope type [R55]. Principal problem is Closed fracture of greater trochanter of right femur (CMS/[facility]).    Past Medical History  Jose has a past medical history of CAD (coronary artery disease), ventricular tachycardia, and Prostate cancer (CMS/[facility]).    History of Present Illness   Presented to the emergency room from home via ambulance after an unresponsive episode.  Prior to this episode the patient was at a store with his wife when she started to fall and he tried to grab her.  They both ended up falling on the ground.  He landed on his right hip.  He denies any head trauma or loss of consciousness.  He was able to get back up and ambulate.  They went home and he states that he fell asleep on a chair.  He states he was very tired and has been for a few days now.  His wife tried to arouse him and could not and she called EMS.  The patient apparently woke up within 1 to 2 minutes and does remember EMS responding.  He is currently at his baseline.  He denies any chest discomfort.  He does note dyspnea on exertion that has developed since a cruise in January where he developed a viral pneumonia.  Since then he has not been feeling well.    Xray Right Femur 5/8: Closed, acute, displaced fracture through the base of the greater trochanter      PRIOR LEVEL OF FUNCTION AND LIVING ENVIRONMENT     Prior Level of Function      Flowsheet Row Most Recent Value   Dominant Hand right   Ambulation independent   Transferring independent   Toileting independent   Bathing independent   Dressing independent   Eating independent   IADLs independent    Driving/Transportation    Prior Level of Function Comment IND w/ ADL and mobility   Assistive Device Currently Used at Home grab bar          Prior Living Environment      Flowsheet Row Most Recent Value   People in Home spouse   Current Living Arrangements home   Home Accessibility stairs to enter home (Group), stairs within home (Group)   Living Environment Comment Pt and spouse soon moving with no stairs. Currently live in [facility], 1+1+1 SIOBHAN. FF to bed/bath with WIS and tub, -SC +GB          VITALS AND PAIN     PT Vitals      Date/Time Pulse HR Source SpO2 Pt Activity O2 Therapy BP BP Location BP Method Pt Position [facility]   05/13/24 0911 72 Monitor 96 % At rest None (Room air) 174/77 Right upper arm Automatic Lying PER   05/13/24 0915 92 Monitor 94 % At rest None (Room air) 165/73 Right upper arm Automatic Sitting PER          PT Pain      Date/Time Pain Type Side/Orientation Location Rating: Rest Rating: Activity [facility]   05/13/24 0911 Pain Assessment right leg 0 2 PER             Objective   OBJECTIVE     Start time:  0908  End time:  0928  Session Length: 20 min  Mode of Treatment: co-treatment, physical therapy (Decreased tolerance. PT focus on mobility)    General Observations  Patient received supine, in bed. He was no issues or concerns identified by nurse prior to session, agreeable to therapy.      Precautions: fall, weight bearing  Extremity Weight-Bearing Status: right lower extremity  Right LE Weight-Bearing Status: partial weight-bearing (PWB) (50%)           PT Eval and Treat - 05/13/24 0908          Cognition    Orientation Status oriented x 3     Affect/Mental Status WFL     Follows Commands follows one-step commands;75-90% accuracy;repetition of directions required     Cognitive Function safety deficit     Safety Deficit safety precautions follow-through/compliance;insight into deficits/self-awareness;safety precautions awareness     Comment, Cognition Pleasant and  "cooperative. Patient with understanding of current WB status however has difficulty maintaining and needs  frequent cueing throughout session.        Bed Mobility    Bed Mobility Activities left;supine to sit     [place] close supervision     Safety/Cues increased time to complete     Assistive Device head of bed elevated;bed rails        Mobility Belt    Mobility Belt Used for All Out of Bed Activity yes        Sit/Stand Transfer    Surface edge of bed     [place] minimum assist (75% or more patient effort);1 person assist     Safety/Cues increased time to complete;verbal cues;hand placement;sequencing     Assistive Device walker, front-wheeled     Transfer Comments MinAx1 for lift assist and cueing for proper hand placement        Toilet Transfer    Transfer Technique sit/stand     [place] minimum assist (75% or more patient effort);1 person assist     Safety/Cues maintaining precautions;proper use of assistive device     Assistive Device grab bars/safety frame;walker, front-wheeled     Comment Grab bars to L        Gait Training    [place], Gait minimum assist (75% or more patient effort);1 person assist     Safety/Cues increased time to complete;verbal cues;proper use of assistive device;maintaining precautions     Assistive Device walker, front-wheeled     Distance in Feet 35 feet     Pattern step-through;step-to     Deviations/Abnormal Patterns gait speed decreased;step length decreased;stride length decreased;base of support, narrow     Comment (Gait/Stairs) Ambulating to bathroom and short distance in the gagnon with MinAx1 for steadying. Patient does well with WB status when using step to pattern and cueing however prefers to use step through pattern and needs increased cueing to keep 50% WB and says \"I'm at give or take 50%\". Despite cueing does not revert back to step to pattern. Distance limited by reports of lightheadedness, VSS        Stairs Training    [place], Stairs not tested "        Balance    Static Sitting Balance WFL;sitting, edge of bed     Dynamic Sitting Balance WFL;sitting, edge of bed     Sit to Stand Dynamic Balance mild impairment;supported     Static Standing Balance WFL;supported     Dynamic Standing Balance mild impairment;supported     Comment, Balance w/ RW in stance        Impairments/Safety Issues    Impairments Affecting Function balance;strength;pain;endurance/activity tolerance     Functional Endurance Fair, RPE 1/10     Safety Issues Affecting Function insight into deficits/self-awareness;awareness of need for assistance;safety precautions follow-through/compliance                                    Education Documentation  Assistive/Adaptive Devices, taught by Brown Dave, PT at 5/13/2024  9:42 AM.  Learner: Patient  Readiness: Acceptance  Method: Explanation  Response: Verbalizes Understanding  Comment: PT POC, role of PT, and progression towards goals. Educated on WB status, AD use, and importance of mobility.    Activity, taught by Brown Dave, PT at 5/13/2024  9:42 AM.  Learner: Patient  Readiness: Acceptance  Method: Explanation  Response: Verbalizes Understanding  Comment: PT POC, role of PT, and progression towards goals. Educated on WB status, AD use, and importance of mobility.        Session Outcome  Patient upright, in chair at end of session, chair alarm on, call light in reach, personal items in reach. Nursing notified about patient's performance and patient's position.    AM-PAC™ - Mobility (Current Function)     Turning form your back to your side while in flat bed without using bedrails 3 - A Little   Moving from lying on your back to sitting on the side of a flat bed without using bedrails 3 - A Little   Moving to and from a bed to a chair 3 - A Little   Standing up from a chair using your arms 3 - A Little   To walk in a hospital room 3 - A Little   Climbing 3-5 steps with a railing 2 - A Lot   AM-PAC™ Mobility Score 17  "     ASSESSMENT AND PLAN     Progress Summary  Patient seen for PT follow up session. Patient completes bed mobility with close supervision and elevated HOB. Functional transfers done with RW and MinAx1 for lift assist and cues for hand placement. Ambulating with RW and MiNAx1. When using step to pattern patient doing well with 50% WB however towards end of session using step through and reports \"It's give or take 50%\". Despite cueing patient not reverting back to step to pattern and with poor adherence to WB status. Continued deficits in safety precaution follow through, strength, and endurance. Continue skilled PT to address these deficits to maximize IND. Recommend d/c to SNF once medically able. [facility] 17.    Patient/Family Therapy Goals Statement: To go home    PT Plan      Flowsheet Row Most Recent Value   Rehab Potential good, to achieve stated therapy goals at 05/10/2024 1236   Therapy Frequency 4 times/wk at 05/10/2024 1236   Planned Therapy Interventions balance training, bed mobility training, gait training, strengthening, transfer training, patient/family education, stair training at 05/13/2024 0908            PT Discharge Recommendations      Flowsheet Row Most Recent Value   PT Recommended Discharge Disposition skilled nursing facility at 05/13/2024 0908   Anticipated Equipment Needs if Discharged Home (PT) none at 05/13/2024 0908                 PT Goals      Flowsheet Row Most Recent Value   Bed Mobility Goal 1    Activity/Assistive Device bed mobility activities, all at 05/10/2024 1236   [place] independent at 05/10/2024 1236   Time Frame by discharge at 05/10/2024 1236   Progress/Outcome goal ongoing at 05/10/2024 1236   Transfer Goal 1    Activity/Assistive Device sit-to-stand/stand-to-sit, bed-to-chair/chair-to-bed, walker, front-wheeled at 05/10/2024 1236   [place] modified independence at 05/10/2024 1236   Time Frame by discharge at 05/10/2024 1236   Progress/Outcome goal ongoing at " 05/10/2024 1236   Gait Training Goal 1    Activity/Assistive Device gait (walking locomotion), walker, front-wheeled at 05/10/2024 1236   [place] modified independence at 05/10/2024 1236   Distance 50 at 05/10/2024 1236   Time Frame by discharge at 05/10/2024 1236   Progress/Outcome goal ongoing at 05/10/2024 1236   Stairs Goal 1    Activity/Assistive Device stairs, all skills at 05/10/2024 1236   [place] supervision required at 05/10/2024 1236   Number of Stairs 10 at 05/10/2024 1236   Time Frame by discharge at 05/10/2024 1236   Progress/Outcome goal ongoing at 05/10/2024 1236

## 2024-05-13 NOTE — PLAN OF CARE
Plan of Care Review  Outcome Evaluation: Patient is AAOx4 and aware of pending discharge to [place] via wheelchair van. IV removed, and AVS reviewed with patient.

## 2024-05-15 ENCOUNTER — CARE COORDINATION (OUTPATIENT)
Dept: OTHER | Facility: CLINIC | Age: 89
End: 2024-05-15

## 2024-05-15 NOTE — CARE COORDINATION
Care Transitions Note    Initial Call - Call within 2 business days of discharge: Yes     Attempted to reach patient for transitions of care follow up. Unable to reach patient.    Outreach Attempts:   HIPAA compliant voicemail left for patient.     Patient: Noam Tanner    Patient : 1932   MRN: [id number]    Reason for Admission: Syncope and Collaps  Discharge Date:    RURS:       Was this an external facility discharge? Yes. Discharge Date: 24. Facility Name: [facility]  Pt was admitted at [facility] from 24 - 24, discharged to SNF on 24        Plan for follow-up on next business day.      Mary Kay Nelson RN  Ambulatory Care Manager  [phone]  [email or web address]

## 2024-05-16 ENCOUNTER — CARE COORDINATION (OUTPATIENT)
Dept: OTHER | Facility: CLINIC | Age: 89
End: 2024-05-16

## 2024-05-16 RX ORDER — METOPROLOL SUCCINATE 25 MG/1
25 TABLET, EXTENDED RELEASE ORAL DAILY
COMMUNITY

## 2024-05-16 RX ORDER — VALACYCLOVIR HYDROCHLORIDE 500 MG/1
500 TABLET, FILM COATED ORAL 2 TIMES DAILY
COMMUNITY

## 2024-05-16 SDOH — SOCIAL STABILITY: SOCIAL NETWORK
DO YOU BELONG TO ANY CLUBS OR ORGANIZATIONS SUCH AS CHURCH GROUPS UNIONS, FRATERNAL OR ATHLETIC GROUPS, OR SCHOOL GROUPS?: YES

## 2024-05-16 SDOH — HEALTH STABILITY: MENTAL HEALTH: HOW MANY STANDARD DRINKS CONTAINING ALCOHOL DO YOU HAVE ON A TYPICAL DAY?: PATIENT DOES NOT DRINK

## 2024-05-16 SDOH — HEALTH STABILITY: MENTAL HEALTH: HOW OFTEN DO YOU HAVE A DRINK CONTAINING ALCOHOL?: NEVER

## 2024-05-16 SDOH — ECONOMIC STABILITY: FOOD INSECURITY: WITHIN THE PAST 12 MONTHS, YOU WORRIED THAT YOUR FOOD WOULD RUN OUT BEFORE YOU GOT MONEY TO BUY MORE.: NEVER TRUE

## 2024-05-16 SDOH — SOCIAL STABILITY: SOCIAL NETWORK: HOW OFTEN DO YOU ATTENT MEETINGS OF THE CLUB OR ORGANIZATION YOU BELONG TO?: NEVER

## 2024-05-16 SDOH — ECONOMIC STABILITY: FOOD INSECURITY: WITHIN THE PAST 12 MONTHS, THE FOOD YOU BOUGHT JUST DIDN'T LAST AND YOU DIDN'T HAVE MONEY TO GET MORE.: NEVER TRUE

## 2024-05-16 SDOH — ECONOMIC STABILITY: HOUSING INSECURITY: IN THE LAST 12 MONTHS, HOW MANY PLACES HAVE YOU LIVED?: 2

## 2024-05-16 SDOH — ECONOMIC STABILITY: HOUSING INSECURITY
IN THE LAST 12 MONTHS, WAS THERE A TIME WHEN YOU DID NOT HAVE A STEADY PLACE TO SLEEP OR SLEPT IN A SHELTER (INCLUDING NOW)?: NO

## 2024-05-16 SDOH — SOCIAL STABILITY: SOCIAL NETWORK: HOW OFTEN DO YOU ATTEND CHURCH OR RELIGIOUS SERVICES?: 1 TO 4 TIMES PER YEAR

## 2024-05-16 SDOH — SOCIAL STABILITY: SOCIAL NETWORK: ARE YOU MARRIED, WIDOWED, DIVORCED, SEPARATED, NEVER MARRIED, OR LIVING WITH A PARTNER?: MARRIED

## 2024-05-16 SDOH — SOCIAL STABILITY: SOCIAL NETWORK: HOW OFTEN DO YOU GET TOGETHER WITH FRIENDS OR RELATIVES?: ONCE A WEEK

## 2024-05-16 SDOH — ECONOMIC STABILITY: INCOME INSECURITY: IN THE LAST 12 MONTHS, WAS THERE A TIME WHEN YOU WERE NOT ABLE TO PAY THE MORTGAGE OR RENT ON TIME?: NO

## 2024-05-16 SDOH — SOCIAL STABILITY: SOCIAL NETWORK
IN A TYPICAL WEEK, HOW MANY TIMES DO YOU TALK ON THE PHONE WITH FAMILY, FRIENDS, OR NEIGHBORS?: MORE THAN THREE TIMES A WEEK

## 2024-05-16 SDOH — ECONOMIC STABILITY: TRANSPORTATION INSECURITY
IN THE PAST 12 MONTHS, HAS LACK OF TRANSPORTATION KEPT YOU FROM MEETINGS, WORK, OR FROM GETTING THINGS NEEDED FOR DAILY LIVING?: NO

## 2024-05-16 SDOH — ECONOMIC STABILITY: TRANSPORTATION INSECURITY
IN THE PAST 12 MONTHS, HAS THE LACK OF TRANSPORTATION KEPT YOU FROM MEDICAL APPOINTMENTS OR FROM GETTING MEDICATIONS?: NO

## 2024-05-16 NOTE — CARE COORDINATION
management-Pain management, mobility  follow-up appointment-PCP follow up      Mary Kay Nelson RN  Ambulatory Care Manager  [phone]  [email or web address]

## 2024-05-21 ENCOUNTER — CARE COORDINATION (OUTPATIENT)
Dept: OTHER | Facility: CLINIC | Age: 89
End: 2024-05-21

## 2024-05-21 RX ORDER — ACETAMINOPHEN 325 MG/1
650 TABLET ORAL EVERY 6 HOURS PRN
COMMUNITY

## 2024-05-21 SDOH — SOCIAL STABILITY: SOCIAL INSECURITY: WITHIN THE LAST YEAR, HAVE YOU BEEN HUMILIATED OR EMOTIONALLY ABUSED IN OTHER WAYS BY YOUR PARTNER OR EX-PARTNER?: NO

## 2024-05-21 SDOH — HEALTH STABILITY: PHYSICAL HEALTH: ON AVERAGE, HOW MANY DAYS PER WEEK DO YOU ENGAGE IN MODERATE TO STRENUOUS EXERCISE (LIKE A BRISK WALK)?: 0 DAYS

## 2024-05-21 SDOH — SOCIAL STABILITY: SOCIAL INSECURITY: WITHIN THE LAST YEAR, HAVE YOU BEEN AFRAID OF YOUR PARTNER OR EX-PARTNER?: NO

## 2024-05-21 SDOH — SOCIAL STABILITY: SOCIAL INSECURITY
WITHIN THE LAST YEAR, HAVE YOU BEEN KICKED, HIT, SLAPPED, OR OTHERWISE PHYSICALLY HURT BY YOUR PARTNER OR EX-PARTNER?: NO

## 2024-05-21 SDOH — SOCIAL STABILITY: SOCIAL INSECURITY
WITHIN THE LAST YEAR, HAVE TO BEEN RAPED OR FORCED TO HAVE ANY KIND OF SEXUAL ACTIVITY BY YOUR PARTNER OR EX-PARTNER?: NO

## 2024-05-21 SDOH — HEALTH STABILITY: MENTAL HEALTH
STRESS IS WHEN SOMEONE FEELS TENSE, NERVOUS, ANXIOUS, OR CAN'T SLEEP AT NIGHT BECAUSE THEIR MIND IS TROUBLED. HOW STRESSED ARE YOU?: NOT AT ALL

## 2024-05-21 SDOH — HEALTH STABILITY: PHYSICAL HEALTH: ON AVERAGE, HOW MANY MINUTES DO YOU ENGAGE IN EXERCISE AT THIS LEVEL?: 0 MIN

## 2024-05-21 SDOH — ECONOMIC STABILITY: INCOME INSECURITY: HOW HARD IS IT FOR YOU TO PAY FOR THE VERY BASICS LIKE FOOD, HOUSING, MEDICAL CARE, AND HEATING?: NOT HARD AT ALL

## 2024-05-21 NOTE — CARE COORDINATION
Care Transitions Note    Follow Up Call      Patient Current Location:  [place]    Care Transition Nurse contacted the patient by telephone. Verified name and  as identifiers.    Additional needs identified to be addressed with provider   No needs identified                 Method of communication with provider: none.    Care Summary Note: Follow up call to patient.  He states that he has had a couple of visits from the physical therapist, but it is slow going with improvement.  They have him walking with a walker, using only 50% WB on the right leg, and also doing seated chair exercises.  He states that he is able to get around on the one level, but starting to get anxious because he wants to be able to get upstairs and just be able to climb the stairs again.  We talked about how the therapy would be a slow process due to the injury sustained to get back to his baseline.  He states that his pain is manageable at this time, that he is able to control it with Tylenol two times a day.  Does state that he has a prescription for Oxycodone, but does not want to go that route.  Educated patient on as long as the pain is tolerable and controlled with Tylenol then that is ok, however if the pain starts to exceed that he may need to consider the prescription so that therapy is not stalled.  Pt agreeable to the plan.  Pt has the first follow up with Ortho on .  Will continue to follow for any needs until then.    Plan of care updates since last contact:  Review of patient management of conditions/medications: Fractured right femur  Home Health: PT/OT/Nursing services   DME: Walker       Advance Care Planning:   Does patient have an Advance Directive: deferred at this time, will discuss on future follow up. .    Medication Review:  Medications changed since last call, reviewed today.     Remote Patient Monitoring:  Offered patient enrollment in the Remote Patient Monitoring (RPM) program for in-home

## 2024-06-04 ENCOUNTER — CARE COORDINATION (OUTPATIENT)
Dept: OTHER | Facility: CLINIC | Age: 89
End: 2024-06-04

## 2024-06-04 NOTE — CARE COORDINATION
Care Transitions Note    Follow Up Call      Patient Current Location:  [place]    Care Transition Nurse contacted the patient by telephone. Verified name and  as identifiers.    Additional needs identified to be addressed with provider   No needs identified                 Method of communication with provider: none.    Care Summary Note: Pt states that he is doing well and very pleased with the progress with therapy at this time.  Denies any pain to his right leg (right femur fracture).  Reviewed his upcoming appointment with Dr. Lugo (ortho) on 2024.  Pt denies any needs from CTN at this time.  Agreeable to one last follow up call after appointment next week, then will sign off if no needs.    Plan of care updates since last contact:  Review of patient management of conditions/medications: Pain is well controlled and therapy improving  Home Health: Continuing to work with PT/OT/RN        Advance Care Planning:   Does patient have an Advance Directive: patient declined education.    Medication Review:  No changes since last call.     Remote Patient Monitoring:  Offered patient enrollment in the Remote Patient Monitoring (RPM) program for in-home monitoring: Patient is not eligible for RPM program because: affiliate provider.    Assessments:  Care Transitions 24 Hour Call    Do you have any ongoing symptoms?: No  Do you have all of your prescriptions and are they filled?: Yes  Were you discharged with any Home Care or Post Acute Services: Yes  Post Acute Services: Home Health  Do you have support at home?: Partner/Spouse/SO  Are you an active caregiver in your home?: No  Care Transitions Interventions    Physical Therapy: Completed Other Services: Completed (Comment: nursing, OT)           Care Transitions Subsequent and Final Call    Subsequent and Final Calls  Do you have any ongoing symptoms?: No  Have your medications changed?: No  Do you have any questions related to your medications?:

## 2024-06-10 ENCOUNTER — TRANSCRIBE ORDERS (OUTPATIENT)
Dept: SCHEDULING | Facility: REHABILITATION | Age: 89
End: 2024-06-10

## 2024-06-10 DIAGNOSIS — S72.111A DISPLACED FRACTURE OF GREATER TROCHANTER OF RIGHT FEMUR, INITIAL ENCOUNTER FOR CLOSED FRACTURE (CMS/HCC): ICD-10-CM

## 2024-06-10 DIAGNOSIS — Z78.9 DECREASED ACTIVITIES OF DAILY LIVING (ADL): Primary | ICD-10-CM

## 2024-06-12 ENCOUNTER — CARE COORDINATION (OUTPATIENT)
Dept: OTHER | Facility: CLINIC | Age: 89
End: 2024-06-12

## 2024-06-12 ASSESSMENT — PATIENT HEALTH QUESTIONNAIRE - PHQ9
1. LITTLE INTEREST OR PLEASURE IN DOING THINGS: NOT AT ALL
2. FEELING DOWN, DEPRESSED OR HOPELESS: NOT AT ALL
SUM OF ALL RESPONSES TO PHQ9 QUESTIONS 1 & 2: 0

## 2024-06-12 NOTE — CARE COORDINATION
Care Transitions Note  Final Call     Patient Current Location:  [place]    Care Transition Nurse contacted the patient by telephone. Verified name and  as identifiers.    Patient graduated from the Care Transitions program on 2024.  Patient/family verbalizes confidence in the ability to self-manage at this time. has the ability to self manage at this time..      Advance Care Planning:   Does patient have an Advance Directive: patient declined education.    Handoff:   Patient was not referred to the ACM team due to no additional needs identified.       Care Summary Note: Final outreach successful with patient.  Pt continues to be doing well after his right femur fracture and continues to work with [facility]/PT/OT 3 times a week.  No further needs identified at this time.  Will sign off.    Assessments:  Care Transitions Subsequent and Final Call    Subsequent and Final Calls  Do you have any ongoing symptoms?: No  Have your medications changed?: No  Do you have any questions related to your medications?: No  Do you currently have any active services?: Yes  Are you currently active with any services?: Home Health  Do you have any needs or concerns that I can assist you with?: No  Identified Barriers: None  Care Transitions Interventions    Physical Therapy: Completed Other Services: Completed (Comment: nursing, OT)   Other Interventions:             Goals Addressed                      This Visit's Progress      Community Resource Goal   On track      I will work with the [facility] agency to improve my mobility.    Barriers: none  Plan for overcoming my barriers: N/A  Confidence: 10/10  Anticipated Goal Completion Date: 24        Conditions and Symptoms (pt-stated)   On track      I will schedule office visits, as directed by my provider.  I will keep my appointment or reschedule if I have to cancel.  I will notify my provider of any barriers to my plan of care.  I will notify my provider of any symptoms that

## 2024-07-01 ENCOUNTER — TRANSCRIBE ORDERS (OUTPATIENT)
Dept: REGISTRATION | Facility: REHABILITATION | Age: 89
End: 2024-07-01

## 2024-07-01 ENCOUNTER — HOSPITAL ENCOUNTER (OUTPATIENT)
Dept: PHYSICAL THERAPY | Facility: REHABILITATION | Age: 89
Setting detail: THERAPIES SERIES
Discharge: HOME | End: 2024-07-01
Attending: SPECIALIST
Payer: MEDICARE

## 2024-07-01 DIAGNOSIS — S72.111A DISPLACED FRACTURE OF GREATER TROCHANTER OF RIGHT FEMUR, INITIAL ENCOUNTER FOR CLOSED FRACTURE (CMS/HCC): ICD-10-CM

## 2024-07-01 DIAGNOSIS — Z78.9 DECREASED ACTIVITIES OF DAILY LIVING (ADL): Primary | ICD-10-CM

## 2024-07-01 DIAGNOSIS — R26.2 DIFFICULTY IN WALKING: Primary | ICD-10-CM

## 2024-07-01 PROCEDURE — 97162 PT EVAL MOD COMPLEX 30 MIN: CPT | Mod: GP

## 2024-07-01 PROCEDURE — 97110 THERAPEUTIC EXERCISES: CPT | Mod: GP

## 2024-07-01 NOTE — LETTER
Dear DR. Chan,    Thank you for this referral. Please review the attached notes and plan of care for your approval.  Please contact our department with any questions.     Sincerely,     Christine Blair Mulvihill, PT  [address] [place] [address]  Phone [phone]  Fax  [phone]    By co-signing this Plan of Care (POC) you agree to the following:  I have reviewed the the Plan of Care established by the therapist within this document and certify that the services are skilled and medically necessary. I have reviewed the plan and recommend that these services continue to meet the goals stated in this document.    PHYSICIAN SIGNATURE: __________________________________     DATE: ___________________  TIME: _____________           Physical Therapy Plan of Care 24   Effective from: 2024  Effective to: 10/1/2024    Plan ID: [id number]            Participants as of Finalize on 2024    Name Type Comments Contact Info    Sj Chan MD Surgeon  [phone]    Christine Blair Mulvihill, PT Physical Therapist         Last Progress Notes Note     Author: Mulvihill, Christine Blair, MICHELLE Status: Signed Last edited: 2024  3:00 PM       Physical Therapy Evaluation    [facility] Fax: [phone]    PT EVALUATION FOR OUTPATIENT THERAPY    Patient: Joes Fuentes    MRN: [id number]  : 1932 89 y.o.     Referring Physician: Sj Chan MD  Date of Visit: 2024      Certification Dates:  24 through 10/01/24         Recommended Frequency & Duration:  3 times/week for up to 3 months     Diagnosis:   1. Difficulty in walking    2. Displaced fracture of greater trochanter of right femur, initial encounter for closed fracture (CMS/[facility])        Chief Complaints:   Chief Complaint   Patient presents with   • Dec ROM   • Difficulty Walking   • Pain   • Decreased recreational/play activity   •  Imbalance   • Decreased Mobility   • Abnormality Of Gait   • Balance Deficits   • Dec Strength    • Decreased Endurance       Precautions: cardiac  Precautions additional comments: DM, THN, tachycardia    Past Medical History:   Past Medical History:   Diagnosis Date   • CAD (coronary artery disease)    • Hx of ventricular tachycardia    • Prostate cancer (CMS/HCC)        Past Surgical History: No past surgical history on file.      LEARNING ASSESSMENT    Assessment completed:  Yes    Learner name:  Jose    Learner: Patient    Learning Barriers:  Learning barriers: No Barriers    Preferred Language: English     Needed: No    Education Provided:   Method: Discussion, Handout, and Demonstration  Readiness: eager  Response: Needs reinforcement      CO-LEARNER ASSESSMENT:    Completed: Yes:   Co-Learner name:  Tracy Burns    Learner: Family    Learning Barriers:  Learning barriers: No Barriers    Preferred Language: English     Needed: No    Education Provided:   Method:  Discussion, Handout, and Demonstration  Readiness:   eager  Response:   Needs reinforcement  Comments: daughter very supportive and lives with pt      Welcome letter discussed: Yes Patient provided with Welcome Letter, which includes attendance policy. Provided education regarding cancellation and no-show policy. Education regarding the importance of participation and regular attendance to maximize goal attainment.       OBJECTIVE MEASUREMENTS/DATA:    Time In Session:  Start Time: 1503  Stop Time: 1600  Time Calculation (min): 57 min   Assessment and Plan - 07/01/24 1449          Assessment    Plan of Care reviewed and patient/family in agreement Yes     System Pathology/Pathophysiology Noted musculoskeletal     Functional Limitations in Following Categories (PT Eval) home management;self-care;community/leisure     Rehab Potential/Prognosis good, to achieve stated therapy goals     Problem List pain;impaired balance;decreased ROM;decreased endurance;decreased flexibility;decreased strength     Demonstrates Need for  "Referral to Another Service occupational therapist     Actions taken IE, TA, TE     Clinical Assessment Jose presents s/p right hip fracture 5/13/24 and presents with chief complaints of functional strength deficits, gait deviations with dependence on a SPC and rollator, low grade pain in his left LE.  Objectively his testing was limited as his fracture was sustained only 7.5 weeks ago.  Objective measurements revealed decreased AROM and PROM of right LE, decreased quad tone B,        Jose is 15% functioing per PSFS and 33% per LEFS.  At this time he will benefit from skilled PT intervention to address these issues and progress towards attainment of goals as set at IE.     Plan and Recommendations Initiate PT     Planned Services CPT 77351 Gait training;CPT 46195 Manual therapy;CPT 57026 Neuromuscular Reeducation;CPT 76883 Therapeutic activities;CPT 45213 Therapeutic exercises;CPT 08012 Self-care/Home management training                    General Information - 07/01/24 1449          Session Details    Document Type initial evaluation     Mode of Treatment individual therapy        General Information    Referring Physician Dr. Sj Chan MD     History of present illness/functional impairment Per pt notes fracturing his right hip 4 weeks prior to June 10th after falling while landing on his right hip.  Per pt per MD, \"the chip was not back in place but it was stable and just leave it alone\".   Post hospitalization pt was in subacute rehab for 1 night and decided to continue at home with home care services.  Pt d/c home care services this past Friday.  adls:  difficulties with stairs must go sideways and use 1-2 railings or railing and cane with step to pattern,  Difficulties getting out of low seat and must use arms,  dependent on rollator and SPC in home and community.  Pt has handgrips positioned around home to assist him with ambulation through doorways.   Per pts daughter he has been able to shower I " now however he must use a shower bench (started doing it 2 days ago) and has a railing near the toilet.  Unable to ambulate on uneven terrain such as grass to look at garden.  Pts goal:  Will be going on vacation and would like [place] be able [place].     Existing Precautions/Restrictions cardiac     Precautions comments DM, THN, tachycardia                    Pain/Vitals - 07/01/24 1449          Pain Assessment    Currently in pain Yes     Preferred Pain Scale number (Numeric Rating Pain Scale)     Pain Side/Orientation right     Pain: Body location Leg     Pain Rating (0-10): Pre Activity 1   soreness in entire right leg    Pain Rating (0-10): Post Activity 1        Pre Activity Vital Signs    Pulse 77     SpO2 94 %     Oxygen Therapy None (Room air)     /70     BP Location Left upper arm     BP Method Manual        Pain Intervention    Intervention  monitored     Post Intervention Comments No increase sx post visit                    Falls/Food Screening - 07/01/24 1449          Initial Falls Assessment    One or more falls in the last year Yes     How many times 1     Was the patient injured in any fall Yes     Fall prevention interventions recommended Keep personal items within reach;Use assistive and mobility devices;Educate and re-educate the patient on safety strategies        Food Insecurity    Within the past 12 months, you worried that your food would run out before you got the money to buy more. Never true     Within the past 12 months, the food you bought just didn't last and you didn't have money to get more. Never true                    PT - 07/01/24 1449          Physical Therapy    Physical Therapy [facility] PT        PT Plan    Frequency of treatment 3 times/week     PT Duration 3 months     PT Cert From 07/01/24     PT Cert To 10/01/24     Date PT POC was sent to provider 07/03/24     Signed PT Plan of Care received?  No                        ROM    Range of Motion - 07/01/24 1600          RIGHT: Lower Extremity PROM Assessment    Hip Flexion  34 degrees   (-) SLR       LEFT: Lower Extremity PROM Assessment    Hip Flexion  38 degrees   SLR (-)       RIGHT: Lower Extremity AROM Assessment    Hip Flexion   95 degrees     Hip Abduction   15 degrees     Hip Adduction   0 degrees     Knee Flexion   120     Knee Extension   -8        LEFT: Lower Extremity AROM Assessment    Hip Flexion   108 degrees     Hip Abduction   16 degrees     Hip Adduction   0 degrees     Knee Flexion   120     Knee Extension   -7                   Palpation   Gait and Mobility decreased step length and slight decrease wb on R LE, SPC no bouts of LOB noted  Outcome Measures    PT Outcome Measures - 07/01/24 1449          Objective Outcome Measures    2 Minute Walk Test 313 feet with SPC and close S     30 Second Sit to Stand Test 4 repetitions   with use of B UEs and unable to perform repetitions past 25 seconds       Subjective Outcome Measures    LEFS 26/80 =  33% functioning        NEW (2/6/23):  PSFS     PSFS ACTIVITY 1 carrying objects while walking     PSFS ANSWER 1 0     PSFS ACTIVITY 2 Exercise walking     PSFS ANSWER 2 2     PSFS ACTIVITY 3 difficulties donning and doffing shoes and socks     PSFS ANSWER 3 2     PSFS ACTIVITY 4 stair climbing     PSFS ANSWER 4 2     PSFS Sum of Activity Scores 6     PSFS Number of Activities 4     PSFS Percent Score 15 %                      Goals       •  <enter goal here> (pt-stated)     •  Mutually agreed upon pain goal       Mutually agreed upon pain goal: Will be going on vacation and would like to be [place].       •  OP PT RIGHT HIP FRACTURE LONG TERM GOALS       Long term goals  - hip fracture  Long Term Goals Time Frame Result Comment/Progress   Pt will increase right LE  MMT into flexion, abduction, ER and IR >/= full grade 8-12 weeks     Pt with AROM of right hip to wfls to allow  for return to all preexisting adls.  8-12 weeks     Pt with ability to ambulate moderate community distances  without AD or with B walking sticks with minimal difficulties and brief rest periods.   8-12 weeks     Increase LEFS >/= to 60-70% functioning to indicate overall  increase functioning 8-12 weeks     Improve 2 MWT to 492 feet to meet age related norms to indicate increased endurance and gait speed. 8-12 weeks     Increase TUG to 13.5 sec or greater  to decrease fall risk 8-12 weeks     Pt will be able to transfer sit <-> stand from a normal chair without use of B Ues to indicate overall improvement in LE strength 8-12 weeks     Pt will have complaints of 1/10 pain decreased by 75% or greater. 8-12 weeks     Pt will be able to ascend/descent 12 steps with reciprocal pattern with 1 railing 8-12 weeks     Pt will be able to ambulate with B walking sticks on uneven terrain without difficulties. 8-12 weeks         •  OP PT RIGHT HIP SHORT TERM GOALS 2024       Short term goals {[facility] PT    Short Term Goals Time Frame Result Comment/Progress   TUG test      Pt will increase R LE  MMT into flexion, abduction, ER and IR >/= ½ grade 4-6 weeks     Pt will increase right knee ROM >/= 10 degrees into flexion, extension to improve functional mobility 4-6 weeks     Pt will be able to ambulate short community distances with SPC with close S without difficulties. 4-6 weeks     Increase LEFS >/= 9 points to increase function   4-6 weeks     Pt will improve 2 MWT by 48 feet to meet MDC. 4-6 weeks     Increase TUG </= 13.5 sec to decrease fall risk 4-6 weeks     Pt will be able to ascend 6 steps with SPC and one railing forward with reciprocal pattern without noted difficulties. 4-6 weeks      Pt will be able to safely ambulate household and limited community distances with SPC with modified I.    4-6 weeks     Pt with ability to transfer from a lower seat with use of one UE for assistance with minimal difficulties. 4-6 weeks      Pt will be independent with HEP to increase carryover at home 4-6 weeks                   TREATMENT PLAN:    ORTHO PT FLOWSHEET    Y/N Exercises Current Session Time    MODALITIES- HEAT/ICE  CPT 81457 TOTAL TIME FOR SESSION Not performed    Heat     Ice/Vasocompression     Mechanical Traction     THER ACT  CPT 40676 TOTAL TIME FOR SESSION 8-22 Minutes   Y Pt Education Pt educated re: POC, objective findings, importance of attendance and performance of HEP.    Transfer training     Body Mechanics/Work Training     THER EX  CPT 66286 TOTAL TIME FOR SESSION 8-22 Minutes    CARDIOVASCULAR     Y HEP INSTRUCTION Instructed in primary HEP consisting of bridtes, bkfo, quad sets, glut sets.  Given exercise pro sheet and emailed to pts daughter.  See copy  below flow sheet.   Nv? Nu Step No restrictions?    Stationary Bike     Elliptical     Treadmill     STRENGTHENING      Nv  Nv  Nv   Supine ther-ex       Bridges       Bkfo       Saq            Seated ther-ex      Nv  Nv  Nv  Nv  nv Standing ther-ex     Hip flex     Hip ext     Hip abd     Heel raises     Mini squats     STRETCHING     LE stretching     Other stretching     REPEATED MOVEMENTS     NEURO RE-ED  CPT 76516 TOTAL TIME FOR SESSION Not performed    COORDINATION     POSTURAL RE-ED        PRE-GAIT ACTIVITIES      BALANCE TRAINING      Sitting balance      Nv  nv Standing balance       SLS       Tandem stance     GAIT TRAINING  CPT 13455 TOTAL TIME FOR SESSION Not performed     Nv  nv Ambulation        Even terrain with SPC and no AD       Uneven terrain w/SPC     Dynamic gait     nv Stair negotiation     Curb negotiation     Ramp negotiation     Outdoor ambulation     BWS/vector training     MANUAL  CPT 37726 TOTAL TIME FOR SESSION Not performed    Stretching     Mobilization      Massage     Taping          ASSESSMENT:    This 89 y.o. year old male presents to PT with above stated diagnosis. Physical Therapy evaluation reveals pain, impaired balance, decreased  ROM, decreased endurance, decreased flexibility, decreased strength resulting in home management, self-care, community/leisure limitations. Jose Fuentes will benefit from skilled PT services to address limitation, work towards rehab and patient goals and maximize PLOF of chosen ADLs.     Planned Services: The patient's treatment will include CPT 32156 Gait training, CPT 09774 Manual therapy, CPT 49143 Neuromuscular Reeducation, CPT 50751 Therapeutic activities, CPT 10920 Therapeutic exercises, CPT 49151 Self-care/Home management training, .     Christine Blair Mulvihill, PT                           Current Participants as of 7/4/2024    Name Type Comments Contact Info    Sj Chan MD Surgeon  [phone]    Signature pending    Christine Blair Mulvihill, PT Physical Therapist      Electronically signed by Christine Blair Mulvihill, PT at 7/4/2024 0336 EDT

## 2024-07-01 NOTE — OP PT TREATMENT LOG
ORTHO PT FLOWSHEET    Y/N Exercises Current Session Time    MODALITIES- HEAT/ICE  CPT 87704 TOTAL TIME FOR SESSION Not performed    Heat     Ice/Vasocompression     Mechanical Traction     THER ACT  CPT 62229 TOTAL TIME FOR SESSION 8-22 Minutes   Y Pt Education Pt educated re: POC, objective findings, importance of attendance and performance of HEP.    Transfer training     Body Mechanics/Work Training     THER EX  CPT 79101 TOTAL TIME FOR SESSION 8-22 Minutes    CARDIOVASCULAR     Y HEP INSTRUCTION Instructed in primary HEP consisting of bridtes, bkfo, quad sets, glut sets.  Given exercise pro sheet and emailed to pts daughter.  See copy  below flow sheet.   Nv? Nu Step No restrictions?    Stationary Bike     Elliptical     Treadmill     STRENGTHENING      Nv  Nv  Nv   Supine ther-ex       Bridges       Bkfo       Saq            Seated ther-ex      Nv  Nv  Nv  Nv  nv Standing ther-ex     Hip flex     Hip ext     Hip abd     Heel raises     Mini squats     STRETCHING     LE stretching     Other stretching     REPEATED MOVEMENTS     NEURO RE-ED  CPT 65641 TOTAL TIME FOR SESSION Not performed    COORDINATION     POSTURAL RE-ED        PRE-GAIT ACTIVITIES      BALANCE TRAINING      Sitting balance      Nv  nv Standing balance       SLS       Tandem stance     GAIT TRAINING  CPT 00537 TOTAL TIME FOR SESSION Not performed     Nv  nv Ambulation        Even terrain with SPC and no AD       Uneven terrain w/SPC     Dynamic gait     nv Stair negotiation     Curb negotiation     Ramp negotiation     Outdoor ambulation     BWS/vector training     MANUAL  CPT 21985 TOTAL TIME FOR SESSION Not performed    Stretching     Mobilization      Massage     Taping

## 2024-07-05 NOTE — PROGRESS NOTES
Physical Therapy Evaluation    [facility] Fax: [phone]    PT EVALUATION FOR OUTPATIENT THERAPY    Patient: Jose Fuentes    MRN: [id number]  : 1932 89 y.o.     Referring Physician: Sj Chan MD  Date of Visit: 2024      Certification Dates:  24 through 10/01/24         Recommended Frequency & Duration:  3 times/week for up to 3 months     Diagnosis:   1. Difficulty in walking    2. Displaced fracture of greater trochanter of right femur, initial encounter for closed fracture (CMS/[facility])        Chief Complaints:   Chief Complaint   Patient presents with    Dec ROM    Difficulty Walking    Pain    Decreased recreational/play activity     Imbalance    Decreased Mobility    Abnormality Of Gait    Balance Deficits    Dec Strength    Decreased Endurance       Precautions: cardiac  Precautions additional comments: DM, THN, tachycardia    Past Medical History:   Past Medical History:   Diagnosis Date    CAD (coronary artery disease)     Hx of ventricular tachycardia     Prostate cancer (CMS/HCC)        Past Surgical History: No past surgical history on file.      LEARNING ASSESSMENT    Assessment completed:  Yes    Learner name:  Jose    Learner: Patient    Learning Barriers:  Learning barriers: No Barriers    Preferred Language: English     Needed: No    Education Provided:   Method: Discussion, Handout, and Demonstration  Readiness: eager  Response: Needs reinforcement      CO-LEARNER ASSESSMENT:    Completed: Yes:   Co-Learner name:  Tracy Burns    Learner: Family    Learning Barriers:  Learning barriers: No Barriers    Preferred Language: English     Needed: No    Education Provided:   Method:  Discussion, Handout, and Demonstration  Readiness:   eager  Response:   Needs reinforcement  Comments: daughter very supportive and lives with pt      Welcome letter discussed: Yes Patient provided with Welcome Letter, which includes attendance policy. Provided  education regarding cancellation and no-show policy. Education regarding the importance of participation and regular attendance to maximize goal attainment.       OBJECTIVE MEASUREMENTS/DATA:    Time In Session:  Start Time: 1503  Stop Time: 1600  Time Calculation (min): 57 min   Assessment and Plan - 07/01/24 1449          Assessment    Plan of Care reviewed and patient/family in agreement Yes     System Pathology/Pathophysiology Noted musculoskeletal     Functional Limitations in Following Categories (PT Eval) home management;self-care;community/leisure     Rehab Potential/Prognosis good, to achieve stated therapy goals     Problem List pain;impaired balance;decreased ROM;decreased endurance;decreased flexibility;decreased strength     Demonstrates Need for Referral to Another Service occupational therapist     Actions taken IE, TA, TE     Clinical Assessment Jose presents s/p right hip fracture 5/13/24 and presents with chief complaints of functional strength deficits, gait deviations with dependence on a SPC and rollator, low grade pain in his left LE.  Objectively his testing was limited as his fracture was sustained only 7.5 weeks ago.  Objective measurements revealed decreased AROM and PROM of right LE, decreased quad tone B,        Jose is 15% functioing per PSFS and 33% per LEFS.  At this time he will benefit from skilled PT intervention to address these issues and progress towards attainment of goals as set at IE.     Plan and Recommendations Initiate PT     Planned Services CPT 89879 Gait training;CPT 54182 Manual therapy;CPT 03353 Neuromuscular Reeducation;CPT 94698 Therapeutic activities;CPT 89963 Therapeutic exercises;CPT 88635 Self-care/Home management training                    General Information - 07/01/24 1442          Session Details    Document Type initial evaluation     Mode of Treatment individual therapy        General Information    Referring Physician Dr. Sj Chan MD      "History of present illness/functional impairment Per pt notes fracturing his right hip 4 weeks prior to June 10th after falling while landing on his right hip.  Per pt per MD, \"the chip was not back in place but it was stable and just leave it alone\".   Post hospitalization pt was in subacute rehab for 1 night and decided to continue at home with home care services.  Pt d/c home care services this past Friday.  adls:  difficulties with stairs must go sideways and use 1-2 railings or railing and cane with step to pattern,  Difficulties getting out of low seat and must use arms,  dependent on rollator and SPC in home and community.  Pt has handgrips positioned around home to assist him with ambulation through doorways.   Per pts daughter he has been able to shower I now however he must use a shower bench (started doing it 2 days ago) and has a railing near the toilet.  Unable to ambulate on uneven terrain such as grass to look at garden.  Pts goal:  Will be going on vacation and would like [place] be [place]     Existing Precautions/Restrictions cardiac     Precautions comments DM, THN, tachycardia                    Pain/Vitals - 07/01/24 1449          Pain Assessment    Currently in pain Yes     Preferred Pain Scale number (Numeric Rating Pain Scale)     Pain Side/Orientation right     Pain: Body location Leg     Pain Rating (0-10): Pre Activity 1   soreness in entire right leg    Pain Rating (0-10): Post Activity 1        Pre Activity Vital Signs    Pulse 77     SpO2 94 %     Oxygen Therapy None (Room air)     /70     BP Location Left upper arm     BP Method Manual        Pain Intervention    Intervention  monitored     Post Intervention Comments No increase sx post visit                    Falls/Food Screening - 07/01/24 1449          Initial Falls Assessment    One or more falls in the last year Yes     How many times 1     Was the patient injured in any fall Yes     " Fall prevention interventions recommended Keep personal items within reach;Use assistive and mobility devices;Educate and re-educate the patient on safety strategies        Food Insecurity    Within the past 12 months, you worried that your food would run out before you got the money to buy more. Never true     Within the past 12 months, the food you bought just didn't last and you didn't have money to get more. Never true                    PT - 07/01/24 1449          Physical Therapy    Physical Therapy [facility] PT        PT Plan    Frequency of treatment 3 times/week     PT Duration 3 months     PT Cert From 07/01/24     PT Cert To 10/01/24     Date PT POC was sent to provider 07/03/24     Signed PT Plan of Care received?  No                       ROM    Range of Motion - 07/01/24 1600          RIGHT: Lower Extremity PROM Assessment    Hip Flexion  34 degrees   (-) SLR       LEFT: Lower Extremity PROM Assessment    Hip Flexion  38 degrees   SLR (-)       RIGHT: Lower Extremity AROM Assessment    Hip Flexion   95 degrees     Hip Abduction   15 degrees     Hip Adduction   0 degrees     Knee Flexion   120     Knee Extension   -8        LEFT: Lower Extremity AROM Assessment    Hip Flexion   108 degrees     Hip Abduction   16 degrees     Hip Adduction   0 degrees     Knee Flexion   120     Knee Extension   -7                   Palpation   Gait and Mobility decreased step length and slight decrease wb on R LE, SPC no bouts of LOB noted  Outcome Measures    PT Outcome Measures - 07/01/24 1449          Objective Outcome Measures    2 Minute Walk Test 313 feet with SPC and close S     30 Second Sit to Stand Test 4 repetitions   with use of B UEs and unable to perform repetitions past 25 seconds       Subjective Outcome Measures    LEFS 26/80 =  33% functioning        NEW (2/6/23):  PSFS     PSFS ACTIVITY 1 carrying objects while walking     PSFS ANSWER 1 0     PSFS ACTIVITY 2 Exercise walking     PSFS  ANSWER 2 2     PSFS ACTIVITY 3 difficulties donning and doffing shoes and socks     PSFS ANSWER 3 2     PSFS ACTIVITY 4 stair climbing     PSFS ANSWER 4 2     PSFS Sum of Activity Scores 6     PSFS Number of Activities 4     PSFS Percent Score 15 %                      Goals         <enter goal here> (pt-stated)       Mutually agreed upon pain goal       Mutually agreed upon pain goal: Will be going on vacation and would like to be [place].         OP PT RIGHT HIP FRACTURE LONG TERM GOALS       Long term goals  - hip fracture  Long Term Goals Time Frame Result Comment/Progress   Pt will increase right LE  MMT into flexion, abduction, ER and IR >/= full grade 8-12 weeks     Pt with AROM of right hip to wfls to allow for return to all preexisting adls.  8-12 weeks     Pt with ability to ambulate moderate community distances  without AD or with B walking sticks with minimal difficulties and brief rest periods.   8-12 weeks     Increase LEFS >/= to 60-70% functioning to indicate overall  increase functioning 8-12 weeks     Improve 2 MWT to 492 feet to meet age related norms to indicate increased endurance and gait speed. 8-12 weeks     Increase TUG to 13.5 sec or greater  to decrease fall risk 8-12 weeks     Pt will be able to transfer sit <-> stand from a normal chair without use of B Ues to indicate overall improvement in LE strength 8-12 weeks     Pt will have complaints of 1/10 pain decreased by 75% or greater. 8-12 weeks     Pt will be able to ascend/descent 12 steps with reciprocal pattern with 1 railing 8-12 weeks     Pt will be able to ambulate with B walking sticks on uneven terrain without difficulties. 8-12 weeks           OP PT RIGHT HIP SHORT TERM GOALS 2024       Short term goals {[facility] PT    Short Term Goals Time Frame Result Comment/Progress   TUG test      Pt will increase R LE  MMT into flexion, abduction, ER and IR >/= ½ grade 4-6 weeks     Pt will increase  right knee ROM >/= 10 degrees into flexion, extension to improve functional mobility 4-6 weeks     Pt will be able to ambulate short community distances with SPC with close S without difficulties. 4-6 weeks     Increase LEFS >/= 9 points to increase function   4-6 weeks     Pt will improve 2 MWT by 48 feet to meet MDC. 4-6 weeks     Increase TUG </= 13.5 sec to decrease fall risk 4-6 weeks     Pt will be able to ascend 6 steps with SPC and one railing forward with reciprocal pattern without noted difficulties. 4-6 weeks      Pt will be able to safely ambulate household and limited community distances with SPC with modified I.    4-6 weeks     Pt with ability to transfer from a lower seat with use of one UE for assistance with minimal difficulties. 4-6 weeks     Pt will be independent with HEP to increase carryover at home 4-6 weeks                   TREATMENT PLAN:    ORTHO PT FLOWSHEET    Y/N Exercises Current Session Time    MODALITIES- HEAT/ICE  CPT 92211 TOTAL TIME FOR SESSION Not performed    Heat     Ice/Vasocompression     Mechanical Traction     THER ACT  CPT 13109 TOTAL TIME FOR SESSION 8-22 Minutes   Y Pt Education Pt educated re: POC, objective findings, importance of attendance and performance of HEP.    Transfer training     Body Mechanics/Work Training     THER EX  CPT 53251 TOTAL TIME FOR SESSION 8-22 Minutes    CARDIOVASCULAR     Y HEP INSTRUCTION Instructed in primary HEP consisting of bridtes, bkfo, quad sets, glut sets.  Given exercise pro sheet and emailed to pts daughter.  See copy  below flow sheet.   Nv? Nu Step No restrictions?    Stationary Bike     Elliptical     Treadmill     STRENGTHENING      Nv  Nv  Nv   Supine ther-ex       Bridges       Bkfo       Saq            Seated ther-ex      Nv  Nv  Nv  Nv  nv Standing ther-ex     Hip flex     Hip ext     Hip abd     Heel raises     Mini squats     STRETCHING     LE stretching     Other stretching     REPEATED MOVEMENTS     NEURO RE-ED  CPT  10847 TOTAL TIME FOR SESSION Not performed    COORDINATION     POSTURAL RE-ED        PRE-GAIT ACTIVITIES      BALANCE TRAINING      Sitting balance      Nv  nv Standing balance       SLS       Tandem stance     GAIT TRAINING  CPT 89727 TOTAL TIME FOR SESSION Not performed     Nv  nv Ambulation        Even terrain with SPC and no AD       Uneven terrain w/SPC     Dynamic gait     nv Stair negotiation     Curb negotiation     Ramp negotiation     Outdoor ambulation     BWS/vector training     MANUAL  CPT 68281 TOTAL TIME FOR SESSION Not performed    Stretching     Mobilization      Massage     Taping          ASSESSMENT:    This 89 y.o. year old male presents to PT with above stated diagnosis. Physical Therapy evaluation reveals pain, impaired balance, decreased ROM, decreased endurance, decreased flexibility, decreased strength resulting in home management, self-care, community/leisure limitations. Jose Fuentes will benefit from skilled PT services to address limitation, work towards rehab and patient goals and maximize PLOF of chosen ADLs.     Planned Services: The patient's treatment will include CPT 84534 Gait training, CPT 18266 Manual therapy, CPT 32063 Neuromuscular Reeducation, CPT 33546 Therapeutic activities, CPT 42100 Therapeutic exercises, CPT 41909 Self-care/Home management training, .     Christine Blair Mulvihill, PT

## 2024-07-09 ENCOUNTER — TELEPHONE (OUTPATIENT)
Dept: PHYSICAL THERAPY | Facility: REHABILITATION | Age: 89
End: 2024-07-09
Payer: MEDICARE

## 2024-07-09 ENCOUNTER — HOSPITAL ENCOUNTER (OUTPATIENT)
Dept: OCCUPATIONAL THERAPY | Facility: REHABILITATION | Age: 89
Setting detail: THERAPIES SERIES
Discharge: HOME | End: 2024-07-09
Attending: INTERNAL MEDICINE
Payer: MEDICARE

## 2024-07-09 ENCOUNTER — HOSPITAL ENCOUNTER (OUTPATIENT)
Dept: PHYSICAL THERAPY | Facility: REHABILITATION | Age: 89
Setting detail: THERAPIES SERIES
Discharge: HOME | End: 2024-07-09
Attending: SPECIALIST
Payer: MEDICARE

## 2024-07-09 DIAGNOSIS — Z78.9 IMPAIRED INSTRUMENTAL ACTIVITIES OF DAILY LIVING (IADL): ICD-10-CM

## 2024-07-09 DIAGNOSIS — Z78.9 DECREASED ACTIVITIES OF DAILY LIVING (ADL): Primary | ICD-10-CM

## 2024-07-09 DIAGNOSIS — S72.111A DISPLACED FRACTURE OF GREATER TROCHANTER OF RIGHT FEMUR, INITIAL ENCOUNTER FOR CLOSED FRACTURE (CMS/HCC): Primary | ICD-10-CM

## 2024-07-09 DIAGNOSIS — R26.2 DIFFICULTY IN WALKING: ICD-10-CM

## 2024-07-09 PROCEDURE — 97110 THERAPEUTIC EXERCISES: CPT | Mod: GP

## 2024-07-09 PROCEDURE — 97116 GAIT TRAINING THERAPY: CPT | Mod: GP

## 2024-07-09 PROCEDURE — 97167 OT EVAL HIGH COMPLEX 60 MIN: CPT | Mod: GO

## 2024-07-09 PROCEDURE — 97530 THERAPEUTIC ACTIVITIES: CPT | Mod: GP

## 2024-07-09 NOTE — OP OT TREATMENT LOG
OT NEURO FLOW SHEET    EXERCISES CURRENT SESSION TIME   NEURO RE-ED  CPT 92361 TOTAL TIME FOR SESSION Not performed   AAROM/AROM     Strengthening      Strength     Gross Motor Control     Fine Motor Control     Sitting Margareth/Balance     Standing Margareth/Balance      Retraining     THERE ACT  CPT 50337 TOTAL TIME FOR SESSION Not performed   Pain, Vitals, Meds, Etc.     HEP     Functional Mobility     Transfers     THERE EX  CPT 45780 TOTAL TIME FOR SESSION Not performed   PROM     Activity Tolerance     SELF-CARE  CPT 55090 TOTAL TIME FOR SESSION 0-7 Minutes   Pt Education/Safety Discussed  rehab program here at [facility] as an option if desired. Pt with goal to return to driving eventually although aware he does not meat readiness criteria at this time, license not reported to [facility]. Discussed referral criteria and process as well as prerequisite requirements. Pt and daughter appreciative.    ADL Training     IADL Training     MODALITIES  CPT 60415 TOTAL TIME FOR SESSION Not performed   Ice/Heat     ATTENDED E-STIM  CPT 57696 TOTAL TIME FOR SESSION Not performed   Attended E-Stim     SPLINTING  CPT 84837 TOTAL TIME FOR SESSION Not performed   Fabrication/Check Out     Fit     Training     GROUP  CPT 03479 TOTAL TIME FOR SESSION Not performed

## 2024-07-09 NOTE — TELEPHONE ENCOUNTER
Spoke with Miranda at Dr. Sj Chan office ([phone])and asked if ok to progress to low level weights and theraband as tolerated.  Await return call/message on [phone].

## 2024-07-09 NOTE — OP PT TREATMENT LOG
ORTHO PT FLOWSHEET    Y/N Exercises Current Session Time    MODALITIES- HEAT/ICE  CPT 99272 TOTAL TIME FOR SESSION Not performed    Heat     Ice/Vasocompression     Mechanical Traction     THER ACT  CPT 26475 TOTAL TIME FOR SESSION 8-22 Minutes  10 min   Y Pt Education Pt educated re: POC, objective findings, importance of attendance and performance of HEP.    Transfer training     Body Mechanics/Work Training     THER EX  CPT 35040 TOTAL TIME FOR SESSION 8-22 Minutes  35 min    CARDIOVASCULAR     Y HEP INSTRUCTION Instructed in primary HEP consisting of bridtes, bkfo, quad sets, glut sets.  Given exercise pro sheet and emailed to pts daughter.  See copy  below flow sheet.   Nv? Nu Step No restrictions?    Stationary Bike     Elliptical     Treadmill     STRENGTHENING      Y  Y  Y  Y  Y Supine ther-ex       Bridges       Bkfo       Saq       Marching       ER   2/10  3/10  2/10  2/10  2/10     Y Seated ther-ex      Sit to stand   10x     Y  Y  Y  Y  Y Standing ther-ex     Hip flex     Hip ext     Hip abd     Heel raises     Toe raises     Mini squats   2/10  2/10  2/10  2/10    STRETCHING     LE stretching     Other stretching     REPEATED MOVEMENTS     NEURO RE-ED  CPT 26504 TOTAL TIME FOR SESSION Not performed    COORDINATION     POSTURAL RE-ED        PRE-GAIT ACTIVITIES      BALANCE TRAINING      Sitting balance      Nv  nv Standing balance       SLS       Tandem stance     GAIT TRAINING  CPT 57702 TOTAL TIME FOR SESSION 8-22 Minutes  10 min     Nv  nv Ambulation        Even terrain with SPC and no AD       Uneven terrain w/SPC    ft x 2 w/cg due to vertigo    Dynamic gait     Y Stair negotiation 1 flight of steps with B railings and cgx1    Curb negotiation     Ramp negotiation     Outdoor ambulation     BWS/vector training     MANUAL  CPT 59072 TOTAL TIME FOR SESSION Not performed    Stretching     Mobilization      Massage     Taping      7/9/24

## 2024-07-09 NOTE — PROGRESS NOTES
Occupational Therapy Evaluation    [facility] Fax: [phone]    OT EVALUATION FOR OUTPATIENT THERAPY    Patient: Jose Fuentes   MRN: [id number]  : 1932 89 y.o.    Referring Physician: Grace Stroud MD  Date of Visit: 2024    Certification Dates:  24 through 24    Recommended Frequency & Duration:  2 times/week for up to 8 weeks        Diagnosis:   1. Decreased activities of daily living (ADL)    2. Impaired instrumental activities of daily living (IADL)        Chief Complaints:   Chief Complaint   Patient presents with    Dec Coordination    Balance Deficits     Difficulty Performing Work/school Tasks    Memory Loss    Self Care Difficulties    Vertigo    Decreased recreational/play activity    Decreased Endurance    Decreased Mobility    Dec Strength       Precautions: Existing Precautions/Restrictions: fall    Past Medical History:   Past Medical History:   Diagnosis Date    CAD (coronary artery disease)     Hx of ventricular tachycardia     Prostate cancer (CMS/HCC)        Past Surgical History: History reviewed. No pertinent surgical history.    LEARNING ASSESSMENT    Assessment completed: Yes    Learner name:  Jose    Learner: Patient    Learning Barriers:  Learning barriers: No Barriers    Preferred Language: English     Needed: No    Education Provided:   Method: Discussion  Readiness: eager  Response: Demonstrated understanding      CO-LEARNER ASSESSMENT:    Completed: Yes:   Co-Learner name:  Grant    Learner: Family    Learning Barriers:  Learning barriers: No Barriers    Preferred Language: English     Needed: No    Education Provided:   Method:  Discussion  Readiness:   eager  Response:   Demonstrated understanding  Comments: n/a      Welcome letter discussed: Yes Patient provided with Welcome Letter, which includes attendance policy. Provided education regarding cancellation and no-show policy. Education regarding the importance of  participation and regular attendance to maximize goal attainment.     OBJECTIVE MEASUREMENTS/DATA:    Time In Session:  Start Time: 1605  Stop Time: 1700  Time Calculation (min): 55 min   Assessment - 07/09/24 1607          Assessment    Plan of Care reviewed and patient/family in agreement Yes     System Pathology/Pathophysiology Noted musculoskeletal     Functional Limitations in Following Categories self-care;home management;work;community/leisure;other (see comments)   driving    Problem List: Occupational Therapy coordination impaired;motor control impaired;strength decreased;impaired balance     Demonstrates Need for Referral to Another Service: Occupational Therapy  rehabilitation     Actions taken will refer if/when appropriate     Rehab Potential/Prognosis: Occupational Therapy good, to achieve stated therapy goals     Clinical Assessment Jose Fuentes is a 89-year-old male (patient of Dr. Stroud) being seen for OT evaluation today s/p hospitalization at [facility] (5/8/24-5/13/24) after a mechanical fall at home with resulting closed fracture of greater trochanter of right femur. He has a PMH significant for HTN, HL, CAD, OA, a-fib. Seen by orthopedics, determined that he would respond with nonoperative management. He followed up with ortho (Dr. Chan) on 6/10/24. Was originally partial weight bearing, now deemed WBAT. He presents today for his outpatient OT initial evaluation accompanied by his daughter Grant. He reports ongoing independence in basic ADLs although requiring increased assistance/having increased difficulty with iADLs including driving, home management and functional carrying tasks. He ambulates throughout the home with a RW at this time. Quantitative measures assessed in the areas of reed  strength, gross motor coordination and divided attention. Pt demonstrated mixed results comparatively between right (dominant) and left UEs. In , pt’s left UE performed better at  62.4lbs compared to 60.2lbs. Of note, right hand  strength falls outside of expected norms for age/gender. However on Box and Blocks Test, pt’s right UE outperformed his left (R: 53 blocks, L: 46 blocks) although here, both scores fall outside of expected norms. On Trail Making test pt able to complete both parts A and B, however outside of norms as well (A: 44 seconds, 1 error, B: 87 seconds, 0 errors). Pt’s license was not reported to [facility] and he has the goal of return to driving when safe and cleared to do so. For these reasons, pt would benefit from initiation of a skilled outpatient OT POC 2x/week for up to 8 weeks addressing his functional balance, endurance and strength and coordination deficits in order to improve his independence in higher level iADLs and leisure pursuits (travel and hobbies) to improve quality of life. Will continue assessment in MoCA, 9 Hole Peg Test, PSFS and Dynavision in upcoming sessions.     Plan and Recommendations pt would benefit from initiation of a skilled outpatient OT POC 2x/week for up to 8 weeks addressing his functional balance, endurance and strength and coordination deficits in order to improve his independence in higher level iADLs and leisure pursuits (travel and hobbies) to improve quality of life. Will continue assessment in MoCA, 9 Hole Peg Test, PSFS and Dynavision in upcoming sessions.     Planned Services CPT 05815 Manual therapy;CPT 41738 Neuromuscular Reeducation;CPT 58250 Orthotics training (Initial encounter);CPT 05528 Orthotics/Prosthetics Management and training (Subsequent encounters);CPT 96699 Self-care/Home management training;CPT 54443 Therapeutic activities;CPT 50458 Therapeutic exercises;CPT 96662 Hot/Cold Packs therapy;CPT 96765 Paraffin Bath     Comments/Additional Services BITs, Dynavision, UBE SCIFIT, Driving Simulator, Work Hardening dept, Pathable                    General Information - 07/09/24 2596          Session Details     Document Type initial evaluation     Mode of Treatment individual therapy        General Information    Onset of Illness/Injury or Date of Surgery 05/08/24     Referring Physician Grace Stroud MD     History of present illness/functional impairment Jose Fuentes is a 89-year-old male (patient of Dr. Stroud) being seen for OT evaluation today s/p hospitalization at [facility] (5/8/24-5/13/24) after a mechanical fall at home with resulting closed fracture of greater trochanter of right femur. He has a PMH significant for HTN, HL, CAD, OA, a-fib. Patient admitted to the hospital for a period of possible unresponsiveness.  He was reportedly difficult to awaken by his wife, but by his reports he was very tired and was in a deep sleep.  He awoke prior to arrival.  He had also been having hip pain after a fall he sustained while trying to prevent his wife from falling.  He was noted to have right greater trochanteric fracture.  Seen by orthopedics, determined that he would respond with nonoperative management. He followed up with ortho (Dr. Chan) on 6/10/24. Of note, he was admitted to SNF on 5/13/24 and signed himself out AMA one day later. He was evaluated by homecare OT but did not qualify for services.     Patient/Family/Caregiver Comments/Observations pt recevied in ST waiting area accompanied by his daughter Grant     Existing Precautions/Restrictions fall                    Pain/Vitals - 07/09/24 1705          Pain Assessment    Currently in pain No/Denies                    OT - 07/09/24 1607          Occupational Therapy Encounter Type Details    Occupational Therapy Specialty Traditional Neuro Program OT        OT Frequency and Duration    Frequency of treatment 2 times/week     OT Duration 8 weeks     OT Cert From 07/09/24     OT Cert To 09/03/24     Date OT POC was sent to provider 07/09/24                    Falls/Food Screening - 07/09/24 1607          Initial Falls Assessment    One or more falls in  the last year Yes     How many times 1     Was the patient injured in any fall Yes   reason for evaluation, trochanteric fracture    Fall prevention interventions recommended Keep personal items within reach;Use assistive and mobility devices;Educate and re-educate the patient on safety strategies        Food Insecurity    Within the past 12 months, you worried that your food would run out before you got the money to buy more. Never true     Within the past 12 months, the food you bought just didn't last and you didn't have money to get more. Never true                     PLOF:    Prior Level of Function - 07/09/24 1607          OTHER    Previous level of function prior to fall, independent in self-care, working BPPV diagnosis - was not the reason for initial fall. (vertigo started in early July only) - pts daughter (who is an RN - applied epley maneuver). Dr. Stroud diagnosed it                   Living Environment    Living Environment - 07/09/24 1607          Living Environment    People in Home child(jimena), adult   no longer living with spouse, marriage dissolving    Living Environment Comment living with daughter Grant at this time. has a reclining bed. 6 steps to bedroom, reed railings installed going up into bedroom and basement (laundry/workshop)     Transportation Concerns no car        Relationship/Environment    Name(s) of People in Home Grant (daughter)                   Cognition     Cognition - 07/09/24 1607          Cognition/Psychosocial    Comment, Cognitive Interventions pt and daughter deny acute changes, generally reduced memory over time due to normal aging                   Reading and Writing     Reading and Writing - 07/09/24 1607          Reading and Writing Assessment    Reading (comments) enjoys reading - restarted this week (had 6 books started) nonfiction, history, autobiography, aviation/space     Writing (comments) writes stories,                   Transfers     Bed Mobility/Transfers -  07/09/24 1607          OTHER    Comments pt ambulates throughout his home and community with RW vs rollator. with distant supervision/modified independence. goal to progress toward least restrictive device                   BADL/IADL    BADL/IADL - 07/09/24 1607          BADL Interventions Assessment    Upper Body Dressing Modified independence     Upper body dressing comments uses rollator in the home, ambulates with qrollator on first floor and standard RW on second floor. has hand  on all doorways     Lower Body Dressing Modified independence     Lower body dressing comments reports some increased sensitivity on right side with laying. but does not interfere with dressing skills     Bathing Modified independence     Bathing comments has shower bench in bathtub (sit swivel), has a hand hald sprayer - all else within reach. does stand intermittently     Toileting Modified independence     Toileting comments railing by toilet     Grooming Independent     Eating Independent        IADL/Home Interventions Assessment    Care of Others comments not responsible     Driving and community mobility comments has not driven since fall/injury - reports desire to driving although realistically does not feel ready at this time. 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 tutoring in english, model  "ships/airplanes, collections from travels (carvings, masks, primitive art), enjoys reading                   Gross and Fine Motor     Gross and Fine Motor - 07/09/24 1607          Hand  Strength Testing    Left Hand, Setting 2 68.2, 60.5, 58.5lbs average = 62.4lbs (norm =  55lbs)     Right Hand, Setting 2 53.6lbs, 60.2, 67.9 average = 60.5lbs (norm = 65.7lbs)                   Outcome Measures:    Outcome Measures - 07/09/24 1607          Objective Outcome Measures    RIGHT hand: Box and Blocks Assessment 53 blocks (norm = 63 blocks)     LEFT hand: Box and Blocks Assessment 46 blocks (norm = 61 blocks)     9 Hole Peg Test - COMMENTS to be assessed (norms: R = 27.5 seconds, L = 29.5 seconds)     Trail Making Part A - TIME 44     Trail Making Part A - ERRORS 1   (self-corrected haphazard error)    [place] Making Part B - TIME 87   Trial 1: terminated at 48 seconds with multiple errors (first error omitted 3). Trial 2: pt counted aloud with increased accuracy throughout.    Trail Making Part B - ERRORS 0        Visual Reaction Timing    Dynavision To be assessed        [place] Cognitive Assessment (MoCA©)    Comments To be assessed        NEW (2/6/23):  PSFS     PSFS ACTIVITY 1 To be assessed                   Vision     Vision - 07/09/24 1607          Symptoms and Outcome Measures    Symptoms/Comments had contact lenses due to cataracts, hearing aides \"when he remembers them\", denies acute changes to vision                     GOALS:     Goals         OT Goals: STG/LTG       Short Term Goals Time Frame Result Comment/Progress   Continue ongoing assessment of MoCA, Dynavision, 9 Hole Peg Test and PSFS and set goals as appropriate 1-2 weeks     Improve right UE hand strength as measured by 3-5 lb improvement in  to maximize UE integration.  4 weeks  7/9/24  L: 62.4 lbs  R: 60.5lbs   Improve reed UE gross motor coordination scores via Box and Blocks assessment by 3-5 blocks to maximize functional capacities and " functional midline reaching patterns.  4 weeks  7/9/24  R: 53 blocks  L: 46 blocks     Decrease reed UE 9 Hole Peg Test time by TBA seconds to maximize functional capabilities during fine motor tasks.  4 weeks  TBA   Pt will engage in basic driving simulator scenarios targeting pedal and steering reaction time and FOD for determination of risk with return to driving.   4 weeks     Pt will score WFL for 50% of predriving assessments: TMT, MoCA, Dynavision, pedal/steering reaction time, [place]  4 weeks     Improve PSFS scores by 10%, reflecting subjective improvement in functional independence and efficiency 4 weeks  TBA   Improve readiness for return to IADL/community reintegration with improved reaction time via Dynavision B 5 sec assessment to </= TBA sec.  4 weeks  TBA   Pt will be independent with progressively monitored  home exercise program to achieve goal attainment.  4 weeks     Improve functional cognition for IADL/community reintegration participation via increase in [place] Making Test assessment by 5 seconds (A and B).  4 weeks  7/9/24  A: 44 seconds, 1 error  B: 87 seconds, 0 errors      Long Term Goals Time Frame Result Comment/Progress   Improve right UE hand strength as measured by 5-7lb improvement in  to maximize UE integration.  8 weeks  7/9/24  L: 62.4 lbs  R: 60.5lbs   Improve reed UE gross motor coordination scores via Box and Blocks assessment by 5-7 blocks to maximize functional capacities and functional midline reaching patterns.  8 weeks  7/9/24  R: 53 blocks  L: 46 blocks     Decrease reed UE 9 Hole Peg Test time by TBA seconds to maximize functional capabilities during fine motor tasks.  8 weeks  TBA   Pt will engage in advanced driving simulator scenarios targeting naturalistic and hazards levels for determination of risk with return to driving.   8 weeks     Pt will score WFL for 100% of predriving assessments: TMT, MoCA, Dynavision, pedal/steering reaction time, [place]  " 8 weeks     Improve PSFS scores by 25%, reflecting subjective improvement in functional independence and efficiency 8 weeks  TBA   Improve readiness for return to IADL/community reintegration with improved reaction time via Dynavision B 5 sec assessment to </= TBA sec.  8 weeks  TBA           OT: patient stated goal (pt-stated)       \"I just want to be able to do what I need to do: get up and down the stairs into the basement and not have to use the walker\"              TREATMENT PLAN:    OT NEURO FLOW SHEET    EXERCISES CURRENT SESSION TIME   NEURO RE-ED  CPT 77968 TOTAL TIME FOR SESSION Not performed   AAROM/AROM     Strengthening      Strength     Gross Motor Control     Fine Motor Control     Sitting Margareth/Balance     Standing Margareth/Balance      Retraining     THERE ACT  CPT 30569 TOTAL TIME FOR SESSION Not performed   Pain, Vitals, Meds, Etc.     HEP     Functional Mobility     Transfers     THERE EX  CPT 23886 TOTAL TIME FOR SESSION Not performed   PROM     Activity Tolerance     SELF-CARE  CPT 21160 TOTAL TIME FOR SESSION 0-7 Minutes   Pt Education/Safety Discussed  rehab program here at [facility] as an option if desired. Pt with goal to return to driving eventually although aware he does not meat readiness criteria at this time, license not reported to [facility]. Discussed referral criteria and process as well as prerequisite requirements. Pt and daughter appreciative.    ADL Training     IADL Training     MODALITIES  CPT 15606 TOTAL TIME FOR SESSION Not performed   Ice/Heat     ATTENDED E-STIM  CPT 15702 TOTAL TIME FOR SESSION Not performed   Attended E-Stim     SPLINTING  CPT 15856 TOTAL TIME FOR SESSION Not performed   Fabrication/Check Out     Fit     Training     GROUP  CPT 49458 TOTAL TIME FOR SESSION Not performed             This 89 y.o. year old male presents to OT with above stated diagnosis. Occupational Therapy evaluation reveals coordination impaired, motor control impaired, strength " decreased, impaired balance resulting in self-care, home management, work, community/leisure, other (see comments) (driving) limitations. Jose Fuentes will benefit from skilled OT services to address limitation, work towards rehab and patient goals and maximize PLOF of chosen ADLs.     Planned Services: The patient’s treatment will include CPT 92755 Manual therapy, CPT 33631 Neuromuscular Reeducation, CPT 18717 Orthotics training (Initial encounter), CPT 40317 Orthotics/Prosthetics Management and training (Subsequent encounters), CPT 10973 Self-care/Home management training, CPT 34246 Therapeutic activities, CPT 37032 Therapeutic exercises, CPT 05893 Hot/Cold Packs therapy, CPT 79095 Paraffin Bath,BITs, Dynavision, UBE SCIFIT, Driving Simulator, Work Hardening dept, [email or web address] [place].    Morena Bryant, OT

## 2024-07-09 NOTE — LETTER
Dear DR. Stroud,    Thank you for this referral. Please review the attached notes and plan of care for your approval.  Please contact our department with any questions.     Sincerely,     Morena Bryant OT  [address] [address]  Phone [phone]  Fax  [phone]      By co-signing this Plan of Care (POC) you agree to the following:  I have reviewed the the Plan of Care established by the therapist within this document and certify that the services are skilled and medically necessary. I have reviewed the plan and recommend that these services continue to meet the goals stated in this document.    PHYSICIAN SIGNATURE: __________________________________     DATE: ___________________  TIME: _____________           Occupational Therapy Plan of Care 24   Effective from: 2024  Effective to: 9/3/2024    Plan ID: [id number]            Participants as of Finalize on 2024    Name Type Comments Contact Info    Morena Bryant OT Occupational Therapist      Grace Stroud MD PCP - General  [phone]       Last Progress Notes Note     Author: Morena Bryant OT Status: Signed Last edited: 2024  4:00 PM       Occupational Therapy Evaluation    [facility] Fax: [phone]    OT EVALUATION FOR OUTPATIENT THERAPY    Patient: Jose Fuentes   MRN: [id number]  : 1932 89 y.o.    Referring Physician: Grace Stroud MD  Date of Visit: 2024    Certification Dates:  24 through 24    Recommended Frequency & Duration:  2 times/week for up to 8 weeks        Diagnosis:   1. Decreased activities of daily living (ADL)    2. Impaired instrumental activities of daily living (IADL)        Chief Complaints:   Chief Complaint   Patient presents with   • Dec Coordination   • Balance Deficits   •  Difficulty Performing Work/school Tasks   • Memory Loss   • Self Care Difficulties   • Vertigo   • Decreased recreational/play activity   • Decreased Endurance   • Decreased  Mobility   • Dec Strength       Precautions: Existing Precautions/Restrictions: fall    Past Medical History:   Past Medical History:   Diagnosis Date   • CAD (coronary artery disease)    • Hx of ventricular tachycardia    • Prostate cancer (CMS/HCC)        Past Surgical History: History reviewed. No pertinent surgical history.    LEARNING ASSESSMENT    Assessment completed: Yes    Learner name:  Jose    Learner: Patient    Learning Barriers:  Learning barriers: No Barriers    Preferred Language: English     Needed: No    Education Provided:   Method: Discussion  Readiness: eager  Response: Demonstrated understanding      CO-LEARNER ASSESSMENT:    Completed: Yes:   Co-Learner name:  Grant    Learner: Family    Learning Barriers:  Learning barriers: No Barriers    Preferred Language: English     Needed: No    Education Provided:   Method:  Discussion  Readiness:   eager  Response:   Demonstrated understanding  Comments: n/a      Welcome letter discussed: Yes Patient provided with Welcome Letter, which includes attendance policy. Provided education regarding cancellation and no-show policy. Education regarding the importance of participation and regular attendance to maximize goal attainment.     OBJECTIVE MEASUREMENTS/DATA:    Time In Session:  Start Time: 1605  Stop Time: 1700  Time Calculation (min): 55 min   Assessment - 07/09/24 1607          Assessment    Plan of Care reviewed and patient/family in agreement Yes     System Pathology/Pathophysiology Noted musculoskeletal     Functional Limitations in Following Categories self-care;home management;work;community/leisure;other (see comments)   driving    Problem List: Occupational Therapy coordination impaired;motor control impaired;strength decreased;impaired balance     Demonstrates Need for Referral to Another Service: Occupational Therapy  rehabilitation     Actions taken will refer if/when appropriate     Rehab  Potential/Prognosis: Occupational Therapy good, to achieve stated therapy goals     Clinical Assessment Jose Fuentes is a 89-year-old male (patient of Dr. Stroud) being seen for OT evaluation today s/p hospitalization at [facility] (5/8/24-5/13/24) after a mechanical fall at home with resulting closed fracture of greater trochanter of right femur. He has a PMH significant for HTN, HL, CAD, OA, a-fib. Seen by orthopedics, determined that he would respond with nonoperative management. He followed up with ortho (Dr. Chan) on 6/10/24. Was originally partial weight bearing, now deemed WBAT. He presents today for his outpatient OT initial evaluation accompanied by his daughter Grant. He reports ongoing independence in basic ADLs although requiring increased assistance/having increased difficulty with iADLs including driving, home management and functional carrying tasks. He ambulates throughout the home with a RW at this time. Quantitative measures assessed in the areas of reed  strength, gross motor coordination and divided attention. Pt demonstrated mixed results comparatively between right (dominant) and left UEs. In , pt’s left UE performed better at 62.4lbs compared to 60.2lbs. Of note, right hand  strength falls outside of expected norms for age/gender. However on Box and Blocks Test, pt’s right UE outperformed his left (R: 53 blocks, L: 46 blocks) although here, both scores fall outside of expected norms. On Trail Making test pt able to complete both parts A and B, however outside of norms as well (A: 44 seconds, 1 error, B: 87 seconds, 0 errors). Pt’s license was not reported to [facility] and he has the goal of return to driving when safe and cleared to do so. For these reasons, pt would benefit from initiation of a skilled outpatient OT POC 2x/week for up to 8 weeks addressing his functional balance, endurance and strength and coordination deficits in order to improve his independence in higher  level iADLs and leisure pursuits (travel and hobbies) to improve quality of life. Will continue assessment in MoCA, 9 Hole Peg Test, PSFS and Dynavision in upcoming sessions.     Plan and Recommendations pt would benefit from initiation of a skilled outpatient OT POC 2x/week for up to 8 weeks addressing his functional balance, endurance and strength and coordination deficits in order to improve his independence in higher level iADLs and leisure pursuits (travel and hobbies) to improve quality of life. Will continue assessment in MoCA, 9 Hole Peg Test, PSFS and Dynavision in upcoming sessions.     Planned Services CPT 54374 Manual therapy;CPT 76246 Neuromuscular Reeducation;CPT 14244 Orthotics training (Initial encounter);CPT 48215 Orthotics/Prosthetics Management and training (Subsequent encounters);CPT 14416 Self-care/Home management training;CPT 99175 Therapeutic activities;CPT 46675 Therapeutic exercises;CPT 07505 Hot/Cold Packs therapy;CPT 46906 Paraffin Bath     Comments/Additional Services BITs, Dynavision, UBE SCIFIT, Driving Simulator, Work Hardening dept, [employer]                    General Information - 07/09/24 1705          Session Details    Document Type initial evaluation     Mode of Treatment individual therapy        General Information    Onset of Illness/Injury or Date of Surgery 05/08/24     Referring Physician Grace Stroud MD     History of present illness/functional impairment Jose Fuentes is a 89-year-old male (patient of Dr. Stroud) being seen for OT evaluation today s/p hospitalization at [facility] (5/8/24-5/13/24) after a mechanical fall at home with resulting closed fracture of greater trochanter of right femur. He has a PMH significant for HTN, HL, CAD, OA, a-fib. Patient admitted to the hospital for a period of possible unresponsiveness.  He was reportedly difficult to awaken by his wife, but by his reports he was very tired and was in a deep sleep.  He awoke prior to  arrival.  He had also been having hip pain after a fall he sustained while trying to prevent his wife from falling.  He was noted to have right greater trochanteric fracture.  Seen by orthopedics, determined that he would respond with nonoperative management. He followed up with ortho (Dr. Chan) on 6/10/24. Of note, he was admitted to SNF on 5/13/24 and signed himself out AMA one day later. He was evaluated by homecare OT but did not qualify for services.     Patient/Family/Caregiver Comments/Observations pt recevied in ST waiting area accompanied by his daughter Grant     Existing Precautions/Restrictions fall                    Pain/Vitals - 07/09/24 1705          Pain Assessment    Currently in pain No/Denies                    OT - 07/09/24 1607          Occupational Therapy Encounter Type Details    Occupational Therapy Specialty Traditional Neuro Program OT        OT Frequency and Duration    Frequency of treatment 2 times/week     OT Duration 8 weeks     OT Cert From 07/09/24     OT Cert To 09/03/24     Date OT POC was sent to provider 07/09/24                    Falls/Food Screening - 07/09/24 1607          Initial Falls Assessment    One or more falls in the last year Yes     How many times 1     Was the patient injured in any fall Yes   reason for evaluation, trochanteric fracture    Fall prevention interventions recommended Keep personal items within reach;Use assistive and mobility devices;Educate and re-educate the patient on safety strategies        Food Insecurity    Within the past 12 months, you worried that your food would run out before you got the money to buy more. Never true     Within the past 12 months, the food you bought just didn't last and you didn't have money to get more. Never true                     PLOF:    Prior Level of Function - 07/09/24 1607          OTHER    Previous level of function prior to fall, independent in self-care, working BPPV diagnosis - was not the reason for  initial fall. (vertigo started in early July only) - pts daughter (who is an RN - applied epley maneuver). Dr. Stroud diagnosed it                   Living Environment    Living Environment - 07/09/24 1607          Living Environment    People in Home child(jimena), adult   no longer living with spouse, marriage dissolving    Living Environment Comment living with daughter Grant at this time. has a reclining bed. 6 steps to bedroom, reed railings installed going up into bedroom and basement (laundry/workshop)     Transportation Concerns no car        Relationship/Environment    Name(s) of People in Home Grant (daughter)                   Cognition     Cognition - 07/09/24 1607          Cognition/Psychosocial    Comment, Cognitive Interventions pt and daughter deny acute changes, generally reduced memory over time due to normal aging                   Reading and Writing     Reading and Writing - 07/09/24 1607          Reading and Writing Assessment    Reading (comments) enjoys reading - restarted this week (had 6 books started) nonfiction, history, autobiography, aviation/space     Writing (comments) writes stories,                   Transfers     Bed Mobility/Transfers - 07/09/24 1607          OTHER    Comments pt ambulates throughout his home and community with RW vs rollator. with distant supervision/modified independence. goal to progress toward least restrictive device                   BADL/IADL    BADL/IADL - 07/09/24 1607          BADL Interventions Assessment    Upper Body Dressing Modified independence     Upper body dressing comments uses rollator in the home, ambulates with qrollator on first floor and standard RW on second floor. has hand  on all doorways     Lower Body Dressing Modified independence     Lower body dressing comments reports some increased sensitivity on right side with laying. but does not interfere with dressing skills     Bathing Modified independence     Bathing comments has shower  bench in bathtub (sit swivel), has a hand hald sprayer - all else within reach. does stand intermittently     Toileting Modified independence     Toileting comments railing by toilet     Grooming Independent     Eating Independent        IADL/Home Interventions Assessment    Care of Others comments not responsible     Driving and community mobility comments has not driven since fall/injury - reports desire to driving although realistically does not feel ready at this time. 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, [employer] tutoring in english, model ships/airplanes, collections from travels (carvings, masks, primitive art), enjoys reading                   Gross and Fine Motor     Gross and Fine Motor - 07/09/24 1607          Hand  Strength Testing    Left Hand, Setting 2 68.2, 60.5, 58.5lbs average = 62.4lbs (norm =  55lbs)     Right Hand, Setting 2 53.6lbs, 60.2, 67.9 average = 60.5lbs (norm = 65.7lbs)                   Outcome Measures:    Outcome Measures - 07/09/24 1607          Objective Outcome Measures    RIGHT hand: Box and Blocks Assessment 53 blocks (norm = 63 blocks)     LEFT hand: Box and Blocks Assessment 46 blocks (norm = 61 blocks)     9 Hole Peg Test - COMMENTS to be assessed (norms: R = 27.5 seconds, L = 29.5 seconds)     Trail Making Part A - TIME 44     Trail Making Part A - ERRORS  "1   (self-corrected haphazard error)    [place] Making Part B - TIME 87   Trial 1: terminated at 48 seconds with multiple errors (first error omitted 3). Trial 2: pt counted aloud with increased accuracy throughout.    Trail Making Part B - ERRORS 0        Visual Reaction Timing    Dynavision To be assessed        Jame Cognitive Assessment (MoCA©)    Comments To be assessed        NEW (2/6/23):  PSFS     PSFS ACTIVITY 1 To be assessed                   Vision     Vision - 07/09/24 1607          Symptoms and Outcome Measures    Symptoms/Comments had contact lenses due to cataracts, hearing aides \"when he remembers them\", denies acute changes to vision                     GOALS:     Goals       •  OT Goals: STG/LTG       Short Term Goals Time Frame Result Comment/Progress   Continue ongoing assessment of MoCA, Dynavision, 9 Hole Peg Test and PSFS and set goals as appropriate 1-2 weeks     Improve right UE hand strength as measured by 3-5 lb improvement in  to maximize UE integration.  4 weeks  7/9/24  L: 62.4 lbs  R: 60.5lbs   Improve reed UE gross motor coordination scores via Box and Blocks assessment by 3-5 blocks to maximize functional capacities and functional midline reaching patterns.  4 weeks  7/9/24  R: 53 blocks  L: 46 blocks     Decrease reed UE 9 Hole Peg Test time by TBA seconds to maximize functional capabilities during fine motor tasks.  4 weeks  TBA   Pt will engage in basic driving simulator scenarios targeting pedal and steering reaction time and FOD for determination of risk with return to driving.   4 weeks     Pt will score WFL for 50% of predriving assessments: TMT, MoCA, Dynavision, pedal/steering reaction time, [place]  4 weeks     Improve PSFS scores by 10%, reflecting subjective improvement in functional independence and efficiency 4 weeks  TBA   Improve readiness for return to IADL/community reintegration with improved reaction time via Dynavision B 5 sec assessment to </= TBA " "sec.  4 weeks  TBA   Pt will be independent with progressively monitored  home exercise program to achieve goal attainment.  4 weeks     Improve functional cognition for IADL/community reintegration participation via increase in [place] Making Test assessment by 5 seconds (A and B).  4 weeks  7/9/24  A: 44 seconds, 1 error  B: 87 seconds, 0 errors      Long Term Goals Time Frame Result Comment/Progress   Improve right UE hand strength as measured by 5-7lb improvement in  to maximize UE integration.  8 weeks  7/9/24  L: 62.4 lbs  R: 60.5lbs   Improve reed UE gross motor coordination scores via Box and Blocks assessment by 5-7 blocks to maximize functional capacities and functional midline reaching patterns.  8 weeks  7/9/24  R: 53 blocks  L: 46 blocks     Decrease reed UE 9 Hole Peg Test time by TBA seconds to maximize functional capabilities during fine motor tasks.  8 weeks  TBA   Pt will engage in advanced driving simulator scenarios targeting naturalistic and hazards levels for determination of risk with return to driving.   8 weeks     Pt will score WFL for 100% of predriving assessments: TMT, MoCA, Dynavision, pedal/steering reaction time, [place]  8 weeks     Improve PSFS scores by 25%, reflecting subjective improvement in functional independence and efficiency 8 weeks  TBA   Improve readiness for return to IADL/community reintegration with improved reaction time via Dynavision B 5 sec assessment to </= TBA sec.  8 weeks  TBA         •  OT: patient stated goal (pt-stated)       \"I just want to be able to do what I need to do: get up and down the stairs into the basement and not have to use the walker\"              TREATMENT PLAN:    OT NEURO FLOW SHEET    EXERCISES CURRENT SESSION TIME   NEURO RE-ED  CPT 42370 TOTAL TIME FOR SESSION Not performed   AAROM/AROM     Strengthening      Strength     Gross Motor Control     Fine Motor Control     Sitting Margareth/Balance     Standing Margareth/Balance      " Retraining     THERE ACT  CPT 51961 TOTAL TIME FOR SESSION Not performed   Pain, Vitals, Meds, Etc.     HEP     Functional Mobility     Transfers     THERE EX  CPT 82422 TOTAL TIME FOR SESSION Not performed   PROM     Activity Tolerance     SELF-CARE  CPT 38841 TOTAL TIME FOR SESSION 0-7 Minutes   Pt Education/Safety Discussed  rehab program here at [facility] as an option if desired. Pt with goal to return to driving eventually although aware he does not meat readiness criteria at this time, license not reported to [facility]. Discussed referral criteria and process as well as prerequisite requirements. Pt and daughter appreciative.    ADL Training     IADL Training     MODALITIES  CPT 14494 TOTAL TIME FOR SESSION Not performed   Ice/Heat     ATTENDED E-STIM  CPT 87247 TOTAL TIME FOR SESSION Not performed   Attended E-Stim     SPLINTING  CPT 48698 TOTAL TIME FOR SESSION Not performed   Fabrication/Check Out     Fit     Training     GROUP  CPT 29060 TOTAL TIME FOR SESSION Not performed             This 89 y.o. year old male presents to OT with above stated diagnosis. Occupational Therapy evaluation reveals coordination impaired, motor control impaired, strength decreased, impaired balance resulting in self-care, home management, work, community/leisure, other (see comments) (driving) limitations. Jose Fuentes will benefit from skilled OT services to address limitation, work towards rehab and patient goals and maximize PLOF of chosen ADLs.     Planned Services: The patient’s treatment will include CPT 93752 Manual therapy, CPT 76512 Neuromuscular Reeducation, CPT 83179 Orthotics training (Initial encounter), CPT 01666 Orthotics/Prosthetics Management and training (Subsequent encounters), CPT 11182 Self-care/Home management training, CPT 33642 Therapeutic activities, CPT 16866 Therapeutic exercises, CPT 36292 Hot/Cold Packs therapy, CPT 43575 Paraffin Bath,BITs, Dynavision, UBE SCIFIT, Driving Simulator, Work  [place] dept, [employer] [place].    Morena Bryant OT             Current Participants as of 7/9/2024    Name Type Comments Contact Info    Grace Stroud MD PCP - General  [phone]    Signature pending    Morena Bryant OT Occupational Therapist      Electronically signed by Morena Bryant OT at 7/9/2024 1750 EDT

## 2024-07-10 ENCOUNTER — HOSPITAL ENCOUNTER (OUTPATIENT)
Dept: PHYSICAL THERAPY | Facility: REHABILITATION | Age: 89
Setting detail: THERAPIES SERIES
Discharge: HOME | End: 2024-07-10
Attending: SPECIALIST
Payer: MEDICARE

## 2024-07-10 ENCOUNTER — TELEPHONE (OUTPATIENT)
Dept: PHYSICAL THERAPY | Facility: REHABILITATION | Age: 89
End: 2024-07-10
Payer: MEDICARE

## 2024-07-10 DIAGNOSIS — S72.111A DISPLACED FRACTURE OF GREATER TROCHANTER OF RIGHT FEMUR, INITIAL ENCOUNTER FOR CLOSED FRACTURE (CMS/HCC): Primary | ICD-10-CM

## 2024-07-10 DIAGNOSIS — R26.2 DIFFICULTY IN WALKING: ICD-10-CM

## 2024-07-10 PROCEDURE — 97530 THERAPEUTIC ACTIVITIES: CPT | Mod: GP

## 2024-07-10 PROCEDURE — 97110 THERAPEUTIC EXERCISES: CPT | Mod: GP

## 2024-07-10 NOTE — PROGRESS NOTES
"Physical Therapy Visit    PT DAILY NOTE FOR OUTPATIENT THERAPY    Patient: Jose Fuentes MRN: [id number]  : 1932 89 y.o.  Referring Physician: Sj Chan MD  Date of Visit: 2024    Certification Dates: 24 through 10/01/24    Diagnosis:   1. Displaced fracture of greater trochanter of right femur, initial encounter for closed fracture (CMS/[facility])    2. Difficulty in walking        Chief Complaints:       Precautions:   Existing Precautions/Restrictions: cardiac  Precautions comments: DM, THN, tachycardia      TODAY'S VISIT    Time In Session:  Start Time: 0503  Stop Time: 0600  Time Calculation (min): 57 min   History/Vitals/Pain/Encounter Info - 24 1710          Injury History/Precautions/Daily Required Info    Document Type daily treatment     Referring Physician Dr. Sj Chan MD     Existing Precautions/Restrictions cardiac     Precautions comments DM THN, tachycardia     History of present illness/functional impairment Per pt notes fracturing his right hip 4 weeks prior to  after falling while landing on his right hip.  Per pt per MD, \"the chip was not back in place but it was stable and just leave it alone\".   Post hospitalization pt was in subacute rehab for 1 night and decided to continue at home with home care services.  Pt d/c home care services this past Friday.  adls:  difficulties with stairs must go sideways and use 1-2 railings or railing and cane with step to pattern,  Difficulties getting out of low seat and must use arms,  dependent on rollator and SPC in home and community.  Pt has handgrips positioned around home to assist him with ambulation through doorways.   Per pts daughter he has been able to shower I now however he must use a shower bench (started doing it 2 days ago) and has a railing near the toilet.  Unable to ambulate on uneven terrain such as grass to look at garden.  Pts goal:  Will be going on vacation and would like to be able to " [place] [facility] [place].     Patient/Family/Caregiver Comments/Observations Pt notes that he has soreness down the front of his thigh today which he thinks may be from the exercise.  Pt notes that the other night he had a bout of vertigo in the middle of the night and his daughter who is a NP was able to fix it.     Patient reported fall since last visit No        Pain Assessment    Currently in pain Yes     Preferred Pain Scale number (Numeric Rating Pain Scale)     Pain Side/Orientation right     Pain: Body location Hip     Pain Rating (0-10): Pre Activity 1   anterior thigh    Pain Rating (0-10): Post Activity 1        Pain Intervention    Intervention  monitored     Post Intervention Comments no increased pain post visit        Pre Activity Vital Signs    Oxygen Therapy None (Room air)                    Daily Treatment Assessment and Plan - 07/10/24 0802          Daily Treatment Assessment and Plan    Progress toward goals Progressing     Daily Outcome Summary Jose appeared to be safe with B railings and reciprocal pattern on flight of stairs.  With SPC he did not have any episodes of LOB however has had a recent episode of vertigo and will benefit from continued use of rollator until vertigo is completely resolved.     Plan and Recommendations Continue to assess gait and progress as tolerated.  Await MD follow up re: addition of low level progressive resistance as tolerated.  Monitor pts vertigo in relations to progression of AD.                         OBJECTIVE DATA TAKEN TODAY:    None taken    Today's Treatment:                          ORTHO PT FLOWSHEET    Y/N Exercises Current Session Time    MODALITIES- HEAT/ICE  CPT 53403 TOTAL TIME FOR SESSION Not performed    Heat     Ice/Vasocompression     Mechanical Traction     THER ACT  CPT 64641 TOTAL TIME FOR SESSION 8-22 Minutes  10 min   Y Pt Education Pt educated re: POC, objective findings, importance of attendance and performance  of HEP.    Transfer training     Body Mechanics/Work Training     THER EX  CPT 64076 TOTAL TIME FOR SESSION 8-22 Minutes  35 min    CARDIOVASCULAR     Y HEP INSTRUCTION Instructed in primary HEP consisting of bridtes, bkfo, quad sets, glut sets.  Given exercise pro sheet and emailed to pts daughter.  See copy  below flow sheet.   Nv? Nu Step No restrictions?    Stationary Bike     Elliptical     Treadmill     STRENGTHENING      Y  Y  Y  Y  Y Supine ther-ex       Bridges       Bkfo       Saq       Marching       ER   2/10  3/10  2/10  2/10  2/10     Y Seated ther-ex      Sit to stand   10x     Y  Y  Y  Y  Y Standing ther-ex     Hip flex     Hip ext     Hip abd     Heel raises     Toe raises     Mini squats   2/10  2/10  2/10  2/10    STRETCHING     LE stretching     Other stretching     REPEATED MOVEMENTS     NEURO RE-ED  CPT 84372 TOTAL TIME FOR SESSION Not performed    COORDINATION     POSTURAL RE-ED        PRE-GAIT ACTIVITIES      BALANCE TRAINING      Sitting balance      Nv  nv Standing balance       SLS       Tandem stance     GAIT TRAINING  CPT 58203 TOTAL TIME FOR SESSION 8-22 Minutes  10 min     Nv  nv Ambulation        Even terrain with SPC and no AD       Uneven terrain w/SPC    ft x 2 w/cg due to vertigo    Dynamic gait     Y Stair negotiation 1 flight of steps with B railings and cgx1    Curb negotiation     Ramp negotiation     Outdoor ambulation     BWS/vector training     MANUAL  CPT 64216 TOTAL TIME FOR SESSION Not performed    Stretching     Mobilization      Massage     Taping      7/9/24

## 2024-07-10 NOTE — OP PT TREATMENT LOG
ORTHO PT FLOWSHEET    Y/N Exercises Current Session Time    MODALITIES- HEAT/ICE  CPT 74576 TOTAL TIME FOR SESSION Not performed    Heat     Ice/Vasocompression     Mechanical Traction     THER ACT  CPT 47500 TOTAL TIME FOR SESSION 8-22 Minutes  10 min   Y Pt Education Pt educated re: POC, objective findings, importance of attendance and performance of HEP.    Transfer training    Y Vitals Monitored pre rx, and post visit     THER EX  CPT 30673 TOTAL TIME FOR SESSION 38-52 Minutes  45 min    CARDIOVASCULAR      HEP INSTRUCTION Instructed in primary HEP consisting of bridtes, bkfo, quad sets, glut sets.  Given exercise pro sheet and emailed to pts daughter.  See copy  below flow sheet.     Nv? Nu Step No restrictions?    Stationary Bike     Elliptical     Treadmill     STRENGTHENING      y  y  y  y  Y    y  y Supine ther-ex       Bridges       Bkfo       Saq       Marching       ER        Quad sets       Glut sets   2/10  3/10  2/10  2/10  2/10     y Seated ther-ex      Sit to stand   10x     y  y  y  y  y Standing ther-ex     Hip flex     Hip ext     Hip abd     Heel raises     Toe raises     Mini squats   2/10  2/10  2/10  2/10    STRETCHING      y LE stretching       HS strap stretch   2/30 seconds    Other stretching     REPEATED MOVEMENTS     NEURO RE-ED  CPT 90831 TOTAL TIME FOR SESSION Not performed    COORDINATION     POSTURAL RE-ED        PRE-GAIT ACTIVITIES      BALANCE TRAINING      Sitting balance      Nv  nv Standing balance       SLS       Tandem stance       GAIT TRAINING  CPT 01499 TOTAL TIME FOR SESSION Held today     Nv  nv Ambulation        Even terrain with SPC and no AD       Uneven terrain w/SPC    ft x 2 w/cg due to vertigo    Dynamic gait     n Stair negotiation 1 flight of steps with B railings and cgx1    Curb negotiation     Ramp negotiation     Outdoor ambulation     BWS/vector training     MANUAL  CPT 33776 TOTAL TIME FOR SESSION Not performed    Stretching      Mobilization      Massage     Taping      7/9/24

## 2024-07-10 NOTE — TELEPHONE ENCOUNTER
I received a call back from Dr. Chan who noted that pts fx was 2 months ago and it should be stable so addition of low level strengthening is ok at this time.

## 2024-07-11 ENCOUNTER — TRANSCRIBE ORDERS (OUTPATIENT)
Dept: REGISTRATION | Facility: REHABILITATION | Age: 89
End: 2024-07-11

## 2024-07-11 DIAGNOSIS — H81.10 BENIGN PAROXYSMAL POSITIONAL VERTIGO: Primary | ICD-10-CM

## 2024-07-12 NOTE — PROGRESS NOTES
"Physical Therapy Visit    PT DAILY NOTE FOR OUTPATIENT THERAPY    Patient: Jose Fuentes MRN: [id number]  : 1932 89 y.o.  Referring Physician: Sj Chan MD  Date of Visit: 7/10/2024    Certification Dates: 24 through 10/01/24    Diagnosis:   1. Displaced fracture of greater trochanter of right femur, initial encounter for closed fracture (CMS/[facility])    2. Difficulty in walking        Chief Complaints:       Precautions:   Existing Precautions/Restrictions: cardiac  Precautions comments: DM, THN, tachycardia      TODAY'S VISIT    Time In Session:  Start Time: 0300  Stop Time: 0400  Time Calculation (min): 60 min   History/Vitals/Pain/Encounter Info - 07/10/24 1506          Injury History/Precautions/Daily Required Info    Document Type daily treatment     Primary Therapist Christine Mulvihill, PT     Referring Physician Dr. Sj Chan MD     Existing Precautions/Restrictions cardiac     Precautions comments DM, THN, tachycardia     History of present illness/functional impairment Per pt notes fracturing his right hip 4 weeks prior to  after falling while landing on his right hip.  Per pt per MD, \"the chip was not back in place but it was stable and just leave it alone\".   Post hospitalization pt was in subacute rehab for 1 night and decided to continue at home with home care services.  Pt d/c home care services this past Friday.  adls:  difficulties with stairs must go sideways and use 1-2 railings or railing and cane with step to pattern,  Difficulties getting out of low seat and must use arms,  dependent on rollator and SPC in home and community.  Pt has handgrips positioned around home to assist him with ambulation through doorways.   Per pts daughter he has been able to shower I now however he must use a shower bench (started doing it 2 days ago) and has a railing near the toilet.  Unable to ambulate on uneven terrain such as grass to look at garden.  Pts goal:  Will be " going on vacation and would like to be able to walk [place] car [place] [facility] [place].     Patient/Family/Caregiver Comments/Observations Pt notes that he was feeling lightheaded when he came in the clinic and after drinking water was feeling much better.  He thinks he was dehydrated.     Patient reported fall since last visit No        Pain Assessment    Currently in pain No/Denies     Preferred Pain Scale number (Numeric Rating Pain Scale)     Pain: Body location Hip     Pain Rating (0-10): Pre Activity 0     Pain Rating (0-10): Post Activity 0        Pre Activity Vital Signs    Pulse 67     SpO2 97 %     Oxygen Therapy None (Room air)     /68     BP Location Right upper arm     BP Method Manual     Patient Position Sitting        Post Activity Vital Signs    Post Activity Pulse 63     Post Activity SpO2 95 %     Post Activity Oxygen Therapy None (Room air)     Post Activity /70     Post Activity BP Location Left upper arm     Post Activity BP Method Manual     Patient Position Sitting                    Daily Treatment Assessment and Plan - 07/10/24 2213          Daily Treatment Assessment and Plan    Progress toward goals Progressing     Daily Outcome Summary Pt presented with lightheadedness with vitals wnls upon arrival.  After water and AC pt returned to feeling normal.  Treatment today was held to minimal effort/AROM and his sx were monitored     Plan and Recommendations Continue with treatment.  Progress to low level strengthening as tolerated (call received from referring MD clearing pt).                         OBJECTIVE DATA TAKEN TODAY:    None taken    Today's Treatment:                          ORTHO PT FLOWSHEET    Y/N Exercises Current Session Time    MODALITIES- HEAT/ICE  CPT 11778 TOTAL TIME FOR SESSION Not performed    Heat     Ice/Vasocompression     Mechanical Traction     THER ACT  CPT 43245 TOTAL TIME FOR SESSION 8-22 Minutes  10 min   Y Pt Education Pt educated re: POC,  objective findings, importance of attendance and performance of HEP.    Transfer training    Y Vitals Monitored pre rx, and post visit     THER EX  CPT 05107 TOTAL TIME FOR SESSION 38-52 Minutes  45 min    CARDIOVASCULAR      HEP INSTRUCTION Instructed in primary HEP consisting of bridtes, bkfo, quad sets, glut sets.  Given exercise pro sheet and emailed to pts daughter.  See copy  below flow sheet.     Nv? Nu Step No restrictions?    Stationary Bike     Elliptical     Treadmill     STRENGTHENING      y  y  y  y  Y    y  y Supine ther-ex       Bridges       Bkfo       Saq       Marching       ER        Quad sets       Glut sets   2/10  3/10  2/10  2/10  2/10     y Seated ther-ex      Sit to stand   10x     y  y  y  y  y Standing ther-ex     Hip flex     Hip ext     Hip abd     Heel raises     Toe raises     Mini squats   2/10  2/10  2/10  2/10    STRETCHING      y LE stretching       HS strap stretch   2/30 seconds    Other stretching     REPEATED MOVEMENTS     NEURO RE-ED  CPT 66901 TOTAL TIME FOR SESSION Not performed    COORDINATION     POSTURAL RE-ED        PRE-GAIT ACTIVITIES      BALANCE TRAINING      Sitting balance      Nv  nv Standing balance       SLS       Tandem stance       GAIT TRAINING  CPT 92011 TOTAL TIME FOR SESSION Held today     Nv  nv Ambulation        Even terrain with SPC and no AD       Uneven terrain w/SPC    ft x 2 w/cg due to vertigo    Dynamic gait     n Stair negotiation 1 flight of steps with B railings and cgx1    Curb negotiation     Ramp negotiation     Outdoor ambulation     BWS/vector training     MANUAL  CPT 84116 TOTAL TIME FOR SESSION Not performed    Stretching     Mobilization      Massage     Taping      7/9/24

## 2024-07-18 ENCOUNTER — HOSPITAL ENCOUNTER (OUTPATIENT)
Dept: OCCUPATIONAL THERAPY | Facility: REHABILITATION | Age: 89
Setting detail: THERAPIES SERIES
Discharge: HOME | End: 2024-07-18
Attending: INTERNAL MEDICINE
Payer: MEDICARE

## 2024-07-18 ENCOUNTER — HOSPITAL ENCOUNTER (OUTPATIENT)
Dept: PHYSICAL THERAPY | Facility: REHABILITATION | Age: 89
Setting detail: THERAPIES SERIES
Discharge: HOME | End: 2024-07-18
Attending: SPECIALIST
Payer: MEDICARE

## 2024-07-18 DIAGNOSIS — Z78.9 IMPAIRED INSTRUMENTAL ACTIVITIES OF DAILY LIVING (IADL): ICD-10-CM

## 2024-07-18 DIAGNOSIS — S72.111A DISPLACED FRACTURE OF GREATER TROCHANTER OF RIGHT FEMUR, INITIAL ENCOUNTER FOR CLOSED FRACTURE (CMS/HCC): Primary | ICD-10-CM

## 2024-07-18 DIAGNOSIS — R26.2 DIFFICULTY IN WALKING: ICD-10-CM

## 2024-07-18 DIAGNOSIS — Z78.9 DECREASED ACTIVITIES OF DAILY LIVING (ADL): Primary | ICD-10-CM

## 2024-07-18 PROCEDURE — 97530 THERAPEUTIC ACTIVITIES: CPT | Mod: GP,CQ

## 2024-07-18 PROCEDURE — 97112 NEUROMUSCULAR REEDUCATION: CPT | Mod: GO

## 2024-07-18 PROCEDURE — 97535 SELF CARE MNGMENT TRAINING: CPT | Mod: GO

## 2024-07-18 PROCEDURE — 97530 THERAPEUTIC ACTIVITIES: CPT | Mod: GO

## 2024-07-18 PROCEDURE — 97110 THERAPEUTIC EXERCISES: CPT | Mod: GP,CQ

## 2024-07-18 ASSESSMENT — MONTREAL COGNITIVE ASSESSMENT (MOCA)
ABSTRACTION SUBSCORE: 2
NAMING SUBSCORE: 3
VISUOSPATIAL/EXECUTIVE SUBSCORE: 5
13. ORIENTATION SUBSCORE: 5
DELAYED RECALL SUBSCORE: 2
LANGUAGE SUBSCORE: 3
ATTENTION SUBSCORE: 6

## 2024-07-18 ASSESSMENT — 9 HOLE PEG TEST
TEST_RESULT: 33
TESTTIME_SECONDS: 34

## 2024-07-18 NOTE — OP OT TREATMENT LOG
OT NEURO FLOW SHEET    EXERCISES CURRENT SESSION TIME   NEURO RE-ED  CPT 99597 TOTAL TIME FOR SESSION 8-22 Minutes  20 minutes   AAROM/AROM     Strengthening      Strength     Gross Motor Control     Fine Motor Control Assessed 9HPT, see flowsheet.    Sitting Margareth/Balance     Standing Margareth/Balance      Retraining Assessed Dyn[employer], see flowsheet.    THERE ACT  CPT 55186 TOTAL TIME FOR SESSION 23-37 Minutes  25 mintues   Pain, Vitals, Meds, Etc. Assessed and monitored.  Assessed PSFS, see flowsheet.  Assessed MoCA 8.3, see flowsheet.    HEP     Functional Mobility     Transfers     THERE EX  CPT 81571 TOTAL TIME FOR SESSION Not performed   PROM     Activity Tolerance     SELF-CARE  CPT 15614 TOTAL TIME FOR SESSION 8-22 Minutes  10 minutes   Pt Education/Safety Patient with continued interest in  Rehab. Continued discussion of  rehab program here at [facility] as an option if desired. Pt with goal to return to driving eventually although aware he does not meet readiness criteria at this time, license not reported to [facility]. Discussed referral criteria and process as well as prerequisite requirements. Pt and daughter appreciative.    ADL Training     IADL Training     MODALITIES  CPT 45064 TOTAL TIME FOR SESSION Not performed   Ice/Heat     ATTENDED E-STIM  CPT 83568 TOTAL TIME FOR SESSION Not performed   Attended E-Stim     SPLINTING  CPT 29982 TOTAL TIME FOR SESSION Not performed   Fabrication/Check Out     Fit     Training     GROUP  CPT 87796 TOTAL TIME FOR SESSION Not performed

## 2024-07-18 NOTE — PROGRESS NOTES
"Physical Therapy Visit    PT DAILY NOTE FOR OUTPATIENT THERAPY    Patient: Jose Fuentes MRN: [id number]  : 1932 89 y.o.  Referring Physician: Sj Chan MD  Date of Visit: 2024    Certification Dates: 24 through 10/01/24    Diagnosis:   1. Displaced fracture of greater trochanter of right femur, initial encounter for closed fracture (CMS/[facility])    2. Difficulty in walking        Chief Complaints:  R hip pain, gait dysfunction    Precautions:   Existing Precautions/Restrictions: cardiac  Precautions comments: DM, THN, tachycardia      TODAY'S VISIT    Time In Session:  Start Time: 1605  Stop Time: 1700  Time Calculation (min): 55 min   History/Vitals/Pain/Encounter Info - 24 1558          Injury History/Precautions/Daily Required Info    Document Type daily treatment     Primary Therapist Christine Mulvihill, PT     Chief Complaint/Reason for Visit  R hip pain, gait dysfunction     Referring Physician Dr. Sj Chan MD     Existing Precautions/Restrictions cardiac     Precautions comments DM, THN, tachycardia     History of present illness/functional impairment Per pt notes fracturing his right hip 4 weeks prior to  after falling while landing on his right hip.  Per pt per MD, \"the chip was not back in place but it was stable and just leave it alone\".   Post hospitalization pt was in subacute rehab for 1 night and decided to continue at home with home care services.  Pt d/c home care services this past Friday.  adls:  difficulties with stairs must go sideways and use 1-2 railings or railing and cane with step to pattern,  Difficulties getting out of low seat and must use arms,  dependent on rollator and SPC in home and community.  Pt has handgrips positioned around home to assist him with ambulation through doorways.   Per pts daughter he has been able to shower I now however he must use a shower bench (started doing it 2 days ago) and has a railing near the " toilet.  Unable to ambulate on uneven terrain such as grass to look at garden.  Pts goal:  Will be going on vacation and would like to be able to walk [place] [facility] [place].     Patient/Family/Caregiver Comments/Observations Michael arrived with no pain, just some tiredness in his legs. He was a little sore the day after the last session, but WNL for exercise. He has been walking outside with his rollator and states he may be overdoing it.     Patient reported fall since last visit No        Pain Assessment    Currently in pain No/Denies        Pain Intervention    Intervention  exercise     Post Intervention Comments no pain        Pre Activity Vital Signs    Pulse 68     /58     BP Location Left upper arm     BP Method Manual     Patient Position Sitting                    Daily Treatment Assessment and Plan - 07/18/24 6978          Daily Treatment Assessment and Plan    Progress toward goals Progressing     Daily Outcome Summary Michael arrived with no pain. VSS with no reports of lighheadedness today. Progressed program with good tolerance. Challenged with SLS with need for 2 fingers on parallel bar. He had more difficulty with his left leg. He performed all exercises well with no pain.     Plan and Recommendations Continue stretching, strengthening and balance. Progress from rollator walker as tolerated.                             Today's Treatment:                          ORTHO PT FLOWSHEET    Y/N Exercises Current Session Time    MODALITIES- HEAT/ICE  CPT 74236 TOTAL TIME FOR SESSION Not performed    Heat     Ice/Vasocompression     Mechanical Traction     THER ACT  CPT 46381 TOTAL TIME FOR SESSION 10 minutes    Pt Education Pt educated re: POC, objective findings, importance of attendance and performance of HEP.    Transfer training    y Pain, falls, meds and vitals Monitored pre rx, and post visit     THER EX  CPT 84456 TOTAL TIME FOR SESSION 45 minutes    CARDIOVASCULAR      HEP  INSTRUCTION Instructed in primary HEP consisting of bridtes, bkfo, quad sets, glut sets.  Given exercise pro sheet and emailed to pts daughter.  See copy  below flow sheet.     nv Nu Step No restrictions?    Stationary Bike     Elliptical     Treadmill     STRENGTHENING      y  y  y  y  y  y  y  y Supine ther-ex  Quad sets  Glut sets  Clamshells  BKFO  Marches  Bridges  SAQ  SLR   1 x 10 5 sec  1 x 10 5 sec  2 x 10 3 sec  2 x 10 3 sec  2 x 10 3 sec  2 x 10 3 sec  2 x 10 3 sec  2 x 10 slow lowering     y Seated ther-ex   Sit to stand   2 x 10     y  y  y  y  n Standing ther-ex  HR/TR  Hip flex  Hip ext  Hip abd  Mini squats   2 x 10  2 x 10  2 x 10  2 x 10      STRETCHING      y LE stretching   HS strap stretch   2 x 20 seconds    Other stretching     REPEATED MOVEMENTS     NEURO RE-ED  CPT 44047 TOTAL TIME FOR SESSION Billed with TE    COORDINATION     POSTURAL RE-ED        PRE-GAIT ACTIVITIES      BALANCE TRAINING      Sitting balance      y  y Standing balance  SLS  Modified tandem stance   10 sec x 3 B  20 sec x 3 B    GAIT TRAINING  CPT 77128 TOTAL TIME FOR SESSION Not performed     nv  nv Ambulation   Even terrain with SPC and no AD Uneven terrain w/SPC    ft x 2 w/cg due to vertigo    Dynamic gait     n Stair negotiation 1 flight of steps with B railings and cgx1    Curb negotiation     Ramp negotiation     Outdoor ambulation     BWS/vector training     MANUAL  CPT 84296 TOTAL TIME FOR SESSION Not performed    Stretching     Mobilization      Massage     Taping      7/9/24                                                                             38F 7 days PP p/w elevated BPs, concerning for preeclampsia. will check screening labs for preeclampsia, ua, frequent BP checks, OBGYN consult, reassess.

## 2024-07-18 NOTE — PROGRESS NOTES
Occupational Therapy Visit    OT DAILY NOTE FOR OUTPATIENT THERAPY    Patient: Jose Fuentes   MRN: [id number]  : 1932 89 y.o.  Referring Physician: Grace Stroud MD  Date of Visit: 2024      Certification Dates: 24 through 24    Diagnosis:   1. Decreased activities of daily living (ADL)    2. Impaired instrumental activities of daily living (IADL)        Chief Complaints:   ADL dysfunction and return to driving goals s/p fall with right hip fracture    Precautions:  Existing Precautions/Restrictions: fall    TODAY'S VISIT    Time In Session:      History/Vitals/Pain/Encounter Info - 24 1700          Injury History/Precautions/Daily Required Info    Document Type daily treatment     Primary Therapist Kim Bryant MSOTR/L     Chief Complaint/Reason for Visit  ADL dysfunction and return to driving goals s/p fall with right hip fracture     Onset of Illness/Injury or Date of Surgery 24     Referring Physician Grace Stroud MD     Existing Precautions/Restrictions fall     History of present illness/functional impairment Jose Fuentes is a 89-year-old male (patient of Dr. Stroud) being seen for OT evaluation today s/p hospitalization at [facility] (24-24) after a mechanical fall at home with resulting closed fracture of greater trochanter of right femur. He has a PMH significant for HTN, HL, CAD, OA, a-fib. Patient admitted to the hospital for a period of possible unresponsiveness.  He was reportedly difficult to awaken by his wife, but by his reports he was very tired and was in a deep sleep.  He awoke prior to arrival.  He had also been having hip pain after a fall he sustained while trying to prevent his wife from falling.  He was noted to have right greater trochanteric fracture.  Seen by orthopedics, determined that he would respond with nonoperative management. He followed up with ortho (Dr. Chan) on 6/10/24. Of note, he was admitted to SNF on 24  and signed himself out AMA one day later. He was evaluated by homecare OT but did not qualify for services.     Patient/Family/Caregiver Comments/Observations Patient received in OP lobby accompanied by wife, however wife remained in waiting room.     Patient reported fall since last visit No        Pain Assessment    Currently in pain No/Denies        Pre Activity Vital Signs    Oxygen Therapy None (Room air)     /60     BP Location Left upper arm     BP Method Manual     Patient Position Sitting                    Daily Treatment Assessment and Plan - 07/18/24 1700          Daily Treatment Assessment and Plan    Progress toward goals Progressing     Daily Outcome Summary Session complete, focused on finishing assessments from evaluation. Completed MoCA 8.3, 9HPT, PSFS, and Dynavision. Patient toelrated all activities well, states interest in return to driving. Discussed return to leisure activities via PSFS including gardening, model building, and reading.     Plan and Recommendations pt would benefit from initiation of a skilled outpatient OT POC 2x/week for up to 8 weeks addressing his functional balance, endurance and strength and coordination deficits in order to improve his independence in higher level iADLs and leisure pursuits (travel and hobbies) to improve quality of life. Will continue assessment in MoCA, 9 Hole Peg Test, PSFS and Dynavision in upcoming sessions.                         OBJECTIVE DATA TAKEN TODAY    Gross and Fine Motor    Outcome Measures    Outcome Measures - 07/18/24 1710          Objective Outcome Measures    9 Hole Peg Test - RIGHT HAND TIME 33     9 Hole Peg Test - LEFT HAND TIME 34        NEW (2/6/23):  PSFS     PSFS ACTIVITY 1 retun to leisure activity of reading     PSFS ANSWER 1 2     PSFS ACTIVITY 2 return leisure activity of model building     PSFS ANSWER 2 0     PSFS ACTIVITY 3 balance     PSFS ANSWER 3 5     PSFS ACTIVITY 4 return to driving     PSFS ANSWER 4 0      PSFS ACTIVITY 5 gardening     PSFS ANSWER 5 0     PSFS Sum of Activity Scores 7     PSFS Number of Activities 5     PSFS Percent Score 14 %        Visual Reaction Timing    Dynavision Trial 1: 47 targets, 1.28s reaction speed     Dynavision Score (Mode B, 5 sec light timer) Targets 49     Dynavision Score Avg response time 1.22 Seconds        Jame Cognitive Assessment (MoCA©)    Visuospatial/Executive 5     Naming 3     Attention 6     Language 3     Abstraction 2     Delayed Recall 2     Orientation 5     Comments MoCA 8.3 score = 26/30                     Today's Treatment:    OT NEURO FLOW SHEET    EXERCISES CURRENT SESSION TIME   NEURO RE-ED  CPT 32035 TOTAL TIME FOR SESSION 8-22 Minutes  20 minutes   AAROM/AROM     Strengthening      Strength     Gross Motor Control     Fine Motor Control Assessed 9HPT, see flowsheet.    Sitting Margareth/Balance     Standing Margareth/Balance      Retraining Assessed Dynavision, see flowsheet.    THERE ACT  CPT 31801 TOTAL TIME FOR SESSION 23-37 Minutes  25 mintues   Pain, Vitals, Meds, Etc. Assessed and monitored.  Assessed PSFS, see flowsheet.  Assessed MoCA 8.3, see flowsheet.    HEP     Functional Mobility     Transfers     THERE EX  CPT 61795 TOTAL TIME FOR SESSION Not performed   PROM     Activity Tolerance     SELF-CARE  CPT 26345 TOTAL TIME FOR SESSION 8-22 Minutes  10 minutes   Pt Education/Safety Patient with continued interest in  Rehab. Continued discussion of  rehab program here at [facility] as an option if desired. Pt with goal to return to driving eventually although aware he does not meet readiness criteria at this time, license not reported to [facility]. Discussed referral criteria and process as well as prerequisite requirements. Pt and daughter appreciative.    ADL Training     IADL Training     MODALITIES  CPT 08279 TOTAL TIME FOR SESSION Not performed   Ice/Heat     ATTENDED E-STIM  CPT 06793 TOTAL TIME FOR SESSION Not performed   Attended E-Stim      SPLINTING  CPT 92152 TOTAL TIME FOR SESSION Not performed   Fabrication/Check Out     Fit     Training     GROUP  CPT 78004 TOTAL TIME FOR SESSION Not performed

## 2024-07-18 NOTE — OP PT TREATMENT LOG
ORTHO PT FLOWSHEET    Y/N Exercises Current Session Time    MODALITIES- HEAT/ICE  CPT 19889 TOTAL TIME FOR SESSION Not performed    Heat     Ice/Vasocompression     Mechanical Traction     THER ACT  CPT 86411 TOTAL TIME FOR SESSION 10 minutes    Pt Education Pt educated re: POC, objective findings, importance of attendance and performance of HEP.    Transfer training    y Pain, falls, meds and vitals Monitored pre rx, and post visit     THER EX  CPT 42245 TOTAL TIME FOR SESSION 45 minutes    CARDIOVASCULAR      HEP INSTRUCTION Instructed in primary HEP consisting of bridtes, bkfo, quad sets, glut sets.  Given exercise pro sheet and emailed to pts daughter.  See copy  below flow sheet.     nv Nu Step No restrictions?    Stationary Bike     Elliptical     Treadmill     STRENGTHENING      y  y  y  y  y  y  y  y Supine ther-ex  Quad sets  Glut sets  Clamshells  BKFO  Marches  Bridges  SAQ  SLR   1 x 10 5 sec  1 x 10 5 sec  2 x 10 3 sec  2 x 10 3 sec  2 x 10 3 sec  2 x 10 3 sec  2 x 10 3 sec  2 x 10 slow lowering     y Seated ther-ex   Sit to stand   2 x 10     y  y  y  y  n Standing ther-ex  HR/TR  Hip flex  Hip ext  Hip abd  Mini squats   2 x 10  2 x 10  2 x 10  2 x 10      STRETCHING      y LE stretching   HS strap stretch   2 x 20 seconds    Other stretching     REPEATED MOVEMENTS     NEURO RE-ED  CPT 19644 TOTAL TIME FOR SESSION Billed with TE    COORDINATION     POSTURAL RE-ED        PRE-GAIT ACTIVITIES      BALANCE TRAINING      Sitting balance      y  y Standing balance  SLS  Modified tandem stance   10 sec x 3 B  20 sec x 3 B    GAIT TRAINING  CPT 61459 TOTAL TIME FOR SESSION Not performed     nv  nv Ambulation   Even terrain with SPC and no AD Uneven terrain w/SPC    ft x 2 w/cg due to vertigo    Dynamic gait     n Stair negotiation 1 flight of steps with B railings and cgx1    Curb negotiation     Ramp negotiation     Outdoor ambulation     BWS/vector training     MANUAL  CPT 48095  TOTAL TIME FOR SESSION Not performed    Stretching     Mobilization      Massage     Taping      7/9/24

## 2024-07-19 ENCOUNTER — HOSPITAL ENCOUNTER (OUTPATIENT)
Dept: OCCUPATIONAL THERAPY | Facility: REHABILITATION | Age: 89
Setting detail: THERAPIES SERIES
Discharge: HOME | End: 2024-07-19
Attending: INTERNAL MEDICINE
Payer: MEDICARE

## 2024-07-19 ENCOUNTER — HOSPITAL ENCOUNTER (OUTPATIENT)
Dept: PHYSICAL THERAPY | Facility: REHABILITATION | Age: 89
Setting detail: THERAPIES SERIES
Discharge: HOME | End: 2024-07-19
Attending: SPECIALIST
Payer: MEDICARE

## 2024-07-19 DIAGNOSIS — R26.2 DIFFICULTY IN WALKING: ICD-10-CM

## 2024-07-19 DIAGNOSIS — Z78.9 DECREASED ACTIVITIES OF DAILY LIVING (ADL): Primary | ICD-10-CM

## 2024-07-19 DIAGNOSIS — S72.111A DISPLACED FRACTURE OF GREATER TROCHANTER OF RIGHT FEMUR, INITIAL ENCOUNTER FOR CLOSED FRACTURE (CMS/HCC): Primary | ICD-10-CM

## 2024-07-19 DIAGNOSIS — Z78.9 IMPAIRED INSTRUMENTAL ACTIVITIES OF DAILY LIVING (IADL): ICD-10-CM

## 2024-07-19 PROCEDURE — 97530 THERAPEUTIC ACTIVITIES: CPT | Mod: GP,CQ

## 2024-07-19 PROCEDURE — 97112 NEUROMUSCULAR REEDUCATION: CPT | Mod: GO

## 2024-07-19 PROCEDURE — 97530 THERAPEUTIC ACTIVITIES: CPT | Mod: GO

## 2024-07-19 PROCEDURE — 97110 THERAPEUTIC EXERCISES: CPT | Mod: GP,CQ

## 2024-07-19 PROCEDURE — 97535 SELF CARE MNGMENT TRAINING: CPT | Mod: GO

## 2024-07-19 NOTE — OP PT TREATMENT LOG
ORTHO PT FLOWSHEET    Y/N Exercises Current Session Time    MODALITIES- HEAT/ICE  CPT 73776 TOTAL TIME FOR SESSION Not performed    Heat     Ice/Vasocompression     Mechanical Traction     THER ACT  CPT 15827 TOTAL TIME FOR SESSION 10 minutes    Pt Education Pt educated re: POC, objective findings, importance of attendance and performance of HEP.    Transfer training    y Pain, falls, meds and vitals Monitored pre rx, and post visit     THER EX  CPT 94285 TOTAL TIME FOR SESSION 50 minutes    CARDIOVASCULAR      HEP INSTRUCTION Instructed in primary HEP consisting of bridtes, bkfo, quad sets, glut sets.  Given exercise pro sheet and emailed to pts daughter.  See copy  below flow sheet.     y NUSTEP seat #12 arms #12 Level 1 x 5 minutes [employer]     Elliptical     Treadmill     STRENGTHENING      y  y  y  y  y  y  y  y Supine ther-ex  Quad sets  Glut sets  Clamshells  BKFO  Marches  Bridges  SAQ  SLR   1 x 10 5 sec  1 x 10 5 sec  2 x 10 3 sec  2 x 10 3 sec  2 x 10 3 sec  2 x 10 3 sec  2 x 10 3 sec  2 x 10 slow lowering     y Seated ther-ex   Sit to stand   2 x 10     n  n  n  n  n Standing ther-ex  HR/TR  Hip flex  Hip ext  Hip abd  Mini squats   2 x 10  2 x 10  2 x 10  2 x 10      STRETCHING      y LE stretching   HS strap stretch   2 x 20 seconds    Other stretching     REPEATED MOVEMENTS     NEURO RE-ED  CPT 58441 TOTAL TIME FOR SESSION Billed with TE    COORDINATION     POSTURAL RE-ED        PRE-GAIT ACTIVITIES      BALANCE TRAINING      Sitting balance      y  y Standing balance  SLS  Modified tandem stance   10 sec x 3 B  20 sec x 3 B    GAIT TRAINING  CPT 02106 TOTAL TIME FOR SESSION Not performed     nv  nv Ambulation   Even terrain with SPC and no AD Uneven terrain w/SPC    ft x 2 w/cg due to vertigo    Dynamic gait     n Stair negotiation 1 flight of steps with B railings and cgx1    Curb negotiation     Ramp negotiation     Outdoor ambulation     BWS/vector training      MANUAL  CPT 07188 TOTAL TIME FOR SESSION Not performed    Stretching     Mobilization      Massage     Taping      7/9/24

## 2024-07-19 NOTE — PROGRESS NOTES
"Physical Therapy Visit    PT DAILY NOTE FOR OUTPATIENT THERAPY    Patient: Jose Fuentes MRN: [id number]  : 1932 89 y.o.  Referring Physician: Sj Chan MD  Date of Visit: 2024    Certification Dates: 24 through 10/01/24    Diagnosis:   1. Displaced fracture of greater trochanter of right femur, initial encounter for closed fracture (CMS/[facility])    2. Difficulty in walking        Chief Complaints:  R hip pain, gait dysfunction    Precautions:   Existing Precautions/Restrictions: cardiac  Precautions comments: DM, THN, tachycardia      TODAY'S VISIT    Time In Session:  Start Time: 1300  Stop Time: 1400  Time Calculation (min): 60 min   History/Vitals/Pain/Encounter Info - 24 1257          Injury History/Precautions/Daily Required Info    Document Type daily treatment     Primary Therapist Christine Mulvihill, PT     Chief Complaint/Reason for Visit  R hip pain, gait dysfunction     Referring Physician Dr. Sj Chan MD     Existing Precautions/Restrictions cardiac     Precautions comments DM, THN, tachycardia     History of present illness/functional impairment Per pt notes fracturing his right hip 4 weeks prior to  after falling while landing on his right hip.  Per pt per MD, \"the chip was not back in place but it was stable and just leave it alone\".   Post hospitalization pt was in subacute rehab for 1 night and decided to continue at home with home care services.  Pt d/c home care services this past Friday.  adls:  difficulties with stairs must go sideways and use 1-2 railings or railing and cane with step to pattern,  Difficulties getting out of low seat and must use arms,  dependent on rollator and SPC in home and community.  Pt has handgrips positioned around home to assist him with ambulation through doorways.   Per pts daughter he has been able to shower I now however he must use a shower bench (started doing it 2 days ago) and has a railing near the " toilet.  Unable to ambulate on uneven terrain such as grass to look at garden.  Pts goal:  Will be going on vacation and would like to be able to walk from [place] to [employer] [place].     Patient/Family/Caregiver Comments/Observations Michael arrived with no pain, falls or med changes. He cancelled one appt next week as he feels 3 x/week is too much as he also has 2 days of OT and will be having a vestibular eval as well. He had no adverse effects of last treatment.     Patient reported fall since last visit No        Pain Assessment    Currently in pain No/Denies        Pain Intervention    Intervention  exercise     Post Intervention Comments no pain        Pre Activity Vital Signs    Pulse 64     /60     BP Location Left upper arm     BP Method Manual     Patient Position Sitting                    Daily Treatment Assessment and Plan - 07/19/24 1444          Daily Treatment Assessment and Plan    Progress toward goals Progressing     Daily Outcome Summary Michael arrived with no pain. He has decreased his PT frequency to only 2x/week as he has OT 2x/week and will also be getting evaluated for vestibular PT. He feels 3x is too much. He tolerated addition of NUSTEP x 5 minutes. He had no pain throughout session.     Plan and Recommendations Continue stretching, strengthening and balance. Progress from rollator walker as tolerated.                             Today's Treatment:        ORTHO PT FLOWSHEET    Y/N Exercises Current Session Time    MODALITIES- HEAT/ICE  CPT 36110 TOTAL TIME FOR SESSION Not performed    Heat     Ice/Vasocompression     Mechanical Traction     THER ACT  CPT 88305 TOTAL TIME FOR SESSION 10 minutes    Pt Education Pt educated re: POC, objective findings, importance of attendance and performance of HEP.    Transfer training    y Pain, falls, meds and vitals Monitored pre rx, and post visit     THER EX  CPT 26005 TOTAL TIME FOR SESSION 50 minutes    CARDIOVASCULAR      HEP INSTRUCTION  Instructed in primary HEP consisting of bridtes, bkfo, quad sets, glut sets.  Given exercise pro sheet and emailed to pts daughter.  See copy  below flow sheet.     y NUSTEP seat #12 arms #12 Level 1 x 5 minutes [employer] Bike     Elliptical     Treadmill     STRENGTHENING      y  y  y  y  y  y  y  y Supine ther-ex  Quad sets  Glut sets  Clamshells  BKFO  Marches  Bridges  SAQ  SLR   1 x 10 5 sec  1 x 10 5 sec  2 x 10 3 sec  2 x 10 3 sec  2 x 10 3 sec  2 x 10 3 sec  2 x 10 3 sec  2 x 10 slow lowering     y Seated ther-ex   Sit to stand   2 x 10     n  n  n  n  n Standing ther-ex  HR/TR  Hip flex  Hip ext  Hip abd  Mini squats   2 x 10  2 x 10  2 x 10  2 x 10      STRETCHING      y LE stretching   HS strap stretch   2 x 20 seconds    Other stretching     REPEATED MOVEMENTS     NEURO RE-ED  CPT 17863 TOTAL TIME FOR SESSION Billed with TE    COORDINATION     POSTURAL RE-ED        PRE-GAIT ACTIVITIES      BALANCE TRAINING      Sitting balance      y  y Standing balance  SLS  Modified tandem stance   10 sec x 3 B  20 sec x 3 B    GAIT TRAINING  CPT 75818 TOTAL TIME FOR SESSION Not performed     nv  nv Ambulation   Even terrain with SPC and no AD Uneven terrain w/SPC    ft x 2 w/cg due to vertigo    Dynamic gait     n Stair negotiation 1 flight of steps with B railings and cgx1    Curb negotiation     Ramp negotiation     Outdoor ambulation     BWS/vector training     MANUAL  CPT 52881 TOTAL TIME FOR SESSION Not performed    Stretching     Mobilization      Massage     Taping      7/9/24

## 2024-07-19 NOTE — OP OT TREATMENT LOG
OT NEURO FLOW SHEET    EXERCISES CURRENT SESSION TIME   NEURO RE-ED  CPT 75083 TOTAL TIME FOR SESSION 8-22 Minutes  20 minutes   AAROM/AROM     Strengthening      Strength     Gross Motor Control     Fine Motor Control     Sitting Margareth/Balance     Standing Margareth/Balance      Retraining Assessed Dynavision for dynamic balance, stand tolerance.  B/l 5 sec  -43 targets, 1.40 sec  -53 targets, 1.13 sec  -52 targets, 1.15 sec  RUE Only: 48 targets, 1.22 sec  LUE Only:  45 targets, 1.31 sec  Red/Green 21 targets, 1.36 sec; 22 targets, 1.41 sec  B 5 sec + simple math: 42 target, 1.42 sec  - 37 targets, 1.59 sec      THERE ACT  CPT 01930 TOTAL TIME FOR SESSION 8-22 Minutes  10 minutes   Pain, Vitals, Meds, Etc. Assessed and monitored    HEP     Functional Mobility Community distance to private tx room for driving     Transfers     THERE EX  CPT 40316 TOTAL TIME FOR SESSION Not performed   PROM     Activity Tolerance     SELF-CARE  CPT 45240 TOTAL TIME FOR SESSION 23-37 Minutes  25 minutes   Pt Education/Safety [facility] [address] Safely Simulator completed today as preporation for increased community mobility independence and problem solving for readiness to return to driving. These scores are not normalized, thus comparison will be made between patients performance from session to session as well as therapist expected performance based on in-clinic utilization. Set up is typical without use of adaptations, unless orther wise noted.      1. Reaction Time Steering; Standardized Timing  Practice Avg = .77 sec   Test #1 Avg = .58 sec   Test #2 Avg = .65 sec   Test #3 Avg = .61 sec      2. Pedal Reaction Time; Standardized Timing  Practice Avg = .94 sec   Test #1 Avg = .69 sec   Test #2 Avg = .62 sec   Test #3 Avg = .66 sec      3. Functional Object Detection (FOD)   Full Tutorial with verbal explanation  Practice:   Target Detection Accuracy = 100% (1 extra hit)   Braking Accuracy = 100% (1 extra hit)  Jeremy Position Results = 89.9%  middle karina, 10.1% slight left   Trial 1:   Target Detection Accuracy = 89% (5 extra hit)   Braking Accuracy = 94% (1 extra hit)  Karina Position Results = 100% middle karina    ADL Training     IADL Training     MODALITIES  CPT 52876 TOTAL TIME FOR SESSION Not performed   Ice/Heat     ATTENDED E-STIM  CPT 50148 TOTAL TIME FOR SESSION Not performed   Attended E-Stim     SPLINTING  CPT 96760 TOTAL TIME FOR SESSION Not performed   Fabrication/Check Out     Fit     Training     GROUP  CPT 23674 TOTAL TIME FOR SESSION Not performed

## 2024-07-19 NOTE — PROGRESS NOTES
Occupational Therapy Visit    OT DAILY NOTE FOR OUTPATIENT THERAPY    Patient: Jose Fuentes   MRN: [id number]  : 1932 89 y.o.  Referring Physician: Sj Chan MD  Date of Visit: 2024      Certification Dates: 24 through 24    Diagnosis:   1. Decreased activities of daily living (ADL)    2. Impaired instrumental activities of daily living (IADL)        Chief Complaints:   ADL dysfunction and return to driving goals s/p fall with right hip fracture    Precautions:  Existing Precautions/Restrictions: fall    TODAY'S VISIT    Time In Session:  Start Time: 1401  Stop Time: 1456  Time Calculation (min): 55 min   History/Vitals/Pain/Encounter Info - 24 1449          Injury History/Precautions/Daily Required Info    Document Type daily treatment     Primary Therapist Kim Bryant MSOTR/L     Chief Complaint/Reason for Visit  ADL dysfunction and return to driving goals s/p fall with right hip fracture     Onset of Illness/Injury or Date of Surgery 24     Referring Physician Grace Stroud MD     Existing Precautions/Restrictions fall     History of present illness/functional impairment Jose Fuentes is a 89-year-old male (patient of Dr. Stroud) being seen for OT evaluation today s/p hospitalization at [facility] (24-24) after a mechanical fall at home with resulting closed fracture of greater trochanter of right femur. He has a PMH significant for HTN, HL, CAD, OA, a-fib. Patient admitted to the hospital for a period of possible unresponsiveness.  He was reportedly difficult to awaken by his wife, but by his reports he was very tired and was in a deep sleep.  He awoke prior to arrival.  He had also been having hip pain after a fall he sustained while trying to prevent his wife from falling.  He was noted to have right greater trochanteric fracture.  Seen by orthopedics, determined that he would respond with nonoperative management. He followed up with ortho  (Dr. Chan) on 6/10/24. Of note, he was admitted to SNF on 5/13/24 and signed himself out AMA one day later. He was evaluated by homecare OT but did not qualify for services.     Patient/Family/Caregiver Comments/Observations Pt received in OP lobby, accompanied by daughter.     Patient reported fall since last visit No        Pain Assessment    Currently in pain No/Denies        Pre Activity Vital Signs    Oxygen Therapy None (Room air)                    Daily Treatment Assessment and Plan - 07/19/24 1449          Daily Treatment Assessment and Plan    Progress toward goals Progressing     Daily Outcome Summary Session focused on return to driving skills including dynavision, reaction speed and assessed driving skills via driving simulator. Pt with improved reaction speed on dynavision at 1.13 sec. Pt with delay when added cognitive load. Pt with good steering reaction speed at 0.58 sec and pedal reaction time at 0.62 (mild delay cmopared to [facility] norm of 0.50 sec). Pt able to progress to FOD, overall target 89%, braking 94%, middle karina 100%.     Plan and Recommendations pt would benefit from initiation of a skilled outpatient OT POC 2x/week for up to 8 weeks addressing his functional balance, endurance and strength and coordination deficits in order to improve his independence in higher level iADLs and leisure pursuits (travel and hobbies) to improve quality of life. Will continue assessment in MoCA, 9 Hole Peg Test, PSFS and Dynavision in upcoming sessions.                         OBJECTIVE DATA TAKEN TODAY    None Taken    Today's Treatment:    OT NEURO FLOW SHEET    EXERCISES CURRENT SESSION TIME   NEURO RE-ED  CPT 39706 TOTAL TIME FOR SESSION 8-22 Minutes  20 minutes   AAROM/AROM     Strengthening      Strength     Gross Motor Control     Fine Motor Control     Sitting Margareth/Balance     Standing Margareth/Balance      Retraining Assessed Dynavision for dynamic balance, stand tolerance.  B/l 5 sec  -43 targets,  1.40 sec  -53 targets, 1.13 sec  -52 targets, 1.15 sec  RUE Only: 48 targets, 1.22 sec  LUE Only:  45 targets, 1.31 sec  Red/Green 21 targets, 1.36 sec; 22 targets, 1.41 sec  B 5 sec + simple math: 42 target, 1.42 sec  - 37 targets, 1.59 sec      THERE ACT  CPT 04523 TOTAL TIME FOR SESSION 8-22 Minutes  10 minutes   Pain, Vitals, Meds, Etc. Assessed and monitored    HEP     Functional Mobility Community distance to private tx room for driving     Transfers     THERE EX  CPT 55007 TOTAL TIME FOR SESSION Not performed   PROM     Activity Tolerance     SELF-CARE  CPT 76871 TOTAL TIME FOR SESSION 23-37 Minutes  25 minutes   Pt Education/Safety [facility] Drive Safely Simulator completed today as preporation for increased community mobility independence and problem solving for readiness to return to driving. These scores are not normalized, thus comparison will be made between patients performance from session to session as well as therapist expected performance based on in-clinic utilization. Set up is typical without use of adaptations, unless orther wise noted.      1. Reaction Time Steering; Standardized Timing  Practice Avg = .77 sec   Test #1 Avg = .58 sec   Test #2 Avg = .65 sec   Test #3 Avg = .61 sec      2. Pedal Reaction Time; Standardized Timing  Practice Avg = .94 sec   Test #1 Avg = .69 sec   Test #2 Avg = .62 sec   Test #3 Avg = .66 sec      3. Functional Object Detection (FOD)   Full Tutorial with verbal explanation  Practice:   Target Detection Accuracy = 100% (1 extra hit)   Braking Accuracy = 100% (1 extra hit)  Karina Position Results = 89.9% middle karina, 10.1% slight left   Trial 1:   Target Detection Accuracy = 89% (5 extra hit)   Braking Accuracy = 94% (1 extra hit)  Karina Position Results = 100% middle karina    ADL Training     IADL Training     MODALITIES  CPT 75417 TOTAL TIME FOR SESSION Not performed   Ice/Heat     ATTENDED E-STIM  CPT 88815 TOTAL TIME FOR SESSION Not performed   Attended E-Stim      SPLINTING  CPT 19350 TOTAL TIME FOR SESSION Not performed   Fabrication/Check Out     Fit     Training     GROUP  CPT 12600 TOTAL TIME FOR SESSION Not performed

## 2024-07-23 ENCOUNTER — HOSPITAL ENCOUNTER (OUTPATIENT)
Dept: PHYSICAL THERAPY | Facility: REHABILITATION | Age: 89
Setting detail: THERAPIES SERIES
Discharge: HOME | End: 2024-07-23
Attending: SPECIALIST
Payer: MEDICARE

## 2024-07-23 ENCOUNTER — HOSPITAL ENCOUNTER (OUTPATIENT)
Dept: OCCUPATIONAL THERAPY | Facility: REHABILITATION | Age: 89
Setting detail: THERAPIES SERIES
Discharge: HOME | End: 2024-07-23
Attending: INTERNAL MEDICINE
Payer: MEDICARE

## 2024-07-23 DIAGNOSIS — S72.111A DISPLACED FRACTURE OF GREATER TROCHANTER OF RIGHT FEMUR, INITIAL ENCOUNTER FOR CLOSED FRACTURE (CMS/HCC): Primary | ICD-10-CM

## 2024-07-23 DIAGNOSIS — R26.2 DIFFICULTY IN WALKING: ICD-10-CM

## 2024-07-23 DIAGNOSIS — Z78.9 DECREASED ACTIVITIES OF DAILY LIVING (ADL): Primary | ICD-10-CM

## 2024-07-23 DIAGNOSIS — Z78.9 IMPAIRED INSTRUMENTAL ACTIVITIES OF DAILY LIVING (IADL): ICD-10-CM

## 2024-07-23 PROCEDURE — 97112 NEUROMUSCULAR REEDUCATION: CPT | Mod: GO

## 2024-07-23 PROCEDURE — 97530 THERAPEUTIC ACTIVITIES: CPT | Mod: GO

## 2024-07-23 PROCEDURE — 97110 THERAPEUTIC EXERCISES: CPT | Mod: GP

## 2024-07-23 NOTE — PROGRESS NOTES
Occupational Therapy Visit    OT DAILY NOTE FOR OUTPATIENT THERAPY    Patient: Jose Fuentes   MRN: [id number]  : 1932 89 y.o.  Referring Physician: Sj Chan MD  Date of Visit: 2024      Certification Dates: 24 through 24    Diagnosis:   1. Decreased activities of daily living (ADL)    2. Impaired instrumental activities of daily living (IADL)        Chief Complaints:   ADL dysfunction and return to driving goals s/p fall with right hip fracture    Precautions:  Existing Precautions/Restrictions: fall    TODAY'S VISIT    Time In Session:  Start Time: 1547  Stop Time: 1641  Time Calculation (min): 54 min   History/Vitals/Pain/Encounter Info - 24 1535          Injury History/Precautions/Daily Required Info    Document Type daily treatment     Primary Therapist Kim Bryant MSOTR/L     Chief Complaint/Reason for Visit  ADL dysfunction and return to driving goals s/p fall with right hip fracture     Onset of Illness/Injury or Date of Surgery 24     Referring Physician Grace Stroud MD     Existing Precautions/Restrictions fall     History of present illness/functional impairment Jose Fuentes is a 89-year-old male (patient of Dr. Stroud) being seen for OT evaluation today s/p hospitalization at [facility] (24-24) after a mechanical fall at home with resulting closed fracture of greater trochanter of right femur. He has a PMH significant for HTN, HL, CAD, OA, a-fib. Patient admitted to the hospital for a period of possible unresponsiveness.  He was reportedly difficult to awaken by his wife, but by his reports he was very tired and was in a deep sleep.  He awoke prior to arrival.  He had also been having hip pain after a fall he sustained while trying to prevent his wife from falling.  He was noted to have right greater trochanteric fracture.  Seen by orthopedics, determined that he would respond with nonoperative management. He followed up with ortho  (Dr. Chan) on 6/10/24. Of note, he was admitted to SNF on 5/13/24 and signed himself out AMA one day later. He was evaluated by homecare OT but did not qualify for services.     Patient/Family/Caregiver Comments/Observations Pt received in waiting area accompanied by his daughter.     Patient reported fall since last visit No        Pain Assessment    Currently in pain No/Denies        Pre Activity Vital Signs    Oxygen Therapy None (Room air)     /60     BP Location Left upper arm     BP Method Manual     Patient Position Sitting                    Daily Treatment Assessment and Plan - 07/23/24 1535          Daily Treatment Assessment and Plan    Progress toward goals Progressing     Daily Outcome Summary Session began early as pt/therapist were available. Session focused on brief discussion of possible visual complaints. Pt admitting to no concern visually and shares experience a brief vertigo episode 2 weeks ago lasting 2-3 days. Pt daughter performed a maneuver and pt has not noticed a return of symptoms. Session focused on continued baseline visual assessments. Pt demonstrated slight impaired saccadic scanning speed. Bell Cancellation assessed and pt demonstrated 1 missed bell. At this time discussed with pt/daughter not recommendation for OT Neuro evaluation at this time. Pt/daughter in agreement.     Plan and Recommendations Continue OT POC. pt would benefit from initiation of a skilled outpatient OT POC 2x/week for up to 8 weeks addressing his functional balance, endurance and strength and coordination deficits in order to improve his independence in higher level iADLs and leisure pursuits (travel and hobbies) to improve quality of life.                         OBJECTIVE DATA TAKEN TODAY    Vision     Vision - 07/23/24 1536          Oculomotor Control    Left eye assessed? Yes     Right eye assessed? Yes     Superior assessed? Yes     Inferior assessed? Yes     Diagonal assessed? Yes     Circular  assessed? Yes     Left AROM without target ROM Intact     Left oculomotor AROM Discomfort None      Right AROM without target ROM Intact      Right oculomotor AROM Discomfort None     Superior AROM without target ROM Intact      Superior oculomotor AROM Discomfort None     Inferior AROM without target ROM Intact     Inferior oculomotor AROM Discomfort None     Diagonal AROM without target ROM Intact     Diagonal oculomotor AROM Discomfort None     Circular AROM without target ROM Intact     Circular oculomotor AROM Discomfort None        Saccadic Fixation Speed    Vertical Saccadic Fixation Impaired     Horizontal Saccadic Fixation Impaired     Horizontal Saccades H1 7.29 Seconds     Horizontal Saccades H2 9.55 Seconds     Horizontal Saccades H3 8.08 Seconds     Horizontal Saccades H4 8.13 Seconds     Horizontal Saccades trials average 8.26 Seconds     Vertical Saccades V1 5.64 Seconds     Vertical Saccades V2 6.36 Seconds     Vertical Saccades V3 5.99 Seconds     Vertical Saccades V4 6.72 Seconds     Vertical Saccades trials average 6.18 Seconds     Cheng Devick Test #1 (seconds) 17.7 Seconds     Cheng Devick Test #2 (seconds) 19.31 Seconds     Cheng Devick Test #3 (seconds) 18.93 Seconds     Cheng Devick Total Time 55.94 Seconds     Cheng Devick Errors #1 0     Cheng Devick Errors #2 0     Cheng Devick Errors #3 0     Cheng Devick Total Errors 0        Visual Reaction Timing    Dynavision B 5 sec   52 targets, 1.13 seconds     Outer 2 rings, peripheral   41/41 targets, 1.45 seconds     B 5 sec   52 targets, 1.13 seconds     Dynavision Score (Mode B, 5 sec light timer) Targets 52     Dynavision Score Avg response time 1.13 Seconds     Dynavision Score Overall Impaired                     Today's Treatment:    OT NEURO FLOW SHEET    EXERCISES CURRENT SESSION TIME   NEURO RE-ED  CPT 68438 TOTAL TIME FOR SESSION 38-52 Minutes     AAROM/AROM     Strengthening Seated SCI FIT UE UBE bike  -Pt performed directional propel  alternating forward/reverse direction. Pt performed 3 minutes forward and 3 minutes reverse direction. Pt performed at level 1.5 with goal to maintain RPM above 50. Pt verbalized bilateral arm fatigue. Pt performed for duration of 9 minutes. Brief 1 minute rest break taken following initial 6 minutes performed.      Strength     BITs Reina Cancellation: initial sample and test  -Pt completed visual scanning across full board to select Reina target.   Time to complete: 3:08 minutes  Misses: 1  Distractor's hit: 4    Gross Motor Control     Fine Motor Control     Sitting Margareth/Balance     Standing Margareth/Balance      Retraining Assessed horizontal and vertical saccades and Cheng Devick assessment (refer to flowsheet for results)     Dynavision   B 5 sec   52 targets, 1.13 seconds     Outer 2 rings, peripheral   41/41 targets, 1.45 seconds     B 5 sec   52 targets, 1.13 seconds     THERE ACT  CPT 66652 TOTAL TIME FOR SESSION 8-22 Minutes     Pain, Vitals, Meds, Etc. Assessed and monitored.     Pt/daughter share one instance of vertigo/vestibular issues roughly 2 weeks ago over the course of 2-3 nights. No visual complaints reported by pt or family member. Following discussion with pt/daughter, at this time it is not recommended pt pursue an OP OT Neuro evaluation. Pt does not demonstrate nor report visual deficits to support additional therapy services. Pt is in agreement with continuing traditional OP OT to address functional goals. Pt will monitor vertigo symptoms and seek PT evaluation for further assessment should this issue continue. This therapist contacted primary PT for awareness of concern.     HEP     Functional Mobility CLS with rollator     Transfers     THERE EX  CPT 72764 TOTAL TIME FOR SESSION Not performed   PROM     Activity Tolerance     SELF-CARE  CPT 99302 TOTAL TIME FOR SESSION Not performed   Pt Education/Safety     ADL Training     IADL Training     MODALITIES  CPT 66050 TOTAL TIME FOR SESSION Not  performed   Ice/Heat     ATTENDED E-STIM  CPT 19459 TOTAL TIME FOR SESSION Not performed   Attended E-Stim     SPLINTING  CPT 22896 TOTAL TIME FOR SESSION Not performed   Fabrication/Check Out     Fit     Training     GROUP  CPT 12426 TOTAL TIME FOR SESSION Not performed

## 2024-07-23 NOTE — OP OT TREATMENT LOG
OT NEURO FLOW SHEET    EXERCISES CURRENT SESSION TIME   NEURO RE-ED  CPT 87047 TOTAL TIME FOR SESSION 38-52 Minutes     AAROM/AROM     Strengthening Seated SCI FIT UE UBE bike  -Pt performed directional propel alternating forward/reverse direction. Pt performed 3 minutes forward and 3 minutes reverse direction. Pt performed at level 1.5 with goal to maintain RPM above 50. Pt verbalized bilateral arm fatigue. Pt performed for duration of 9 minutes. Brief 1 minute rest break taken following initial 6 minutes performed.      Strength     BITs Reina Cancellation: initial sample and test  -Pt completed visual scanning across full board to select Reina target.   Time to complete: 3:08 minutes  Misses: 1  Distractor's hit: 4    Gross Motor Control     Fine Motor Control     Sitting Margareth/Balance     Standing Margareth/Balance      Retraining Assessed horizontal and vertical saccades and Cheng Devick assessment (refer to flowsheet for results)     Dynavision   B 5 sec   52 targets, 1.13 seconds     Outer 2 rings, peripheral   41/41 targets, 1.45 seconds     B 5 sec   52 targets, 1.13 seconds     THERE ACT  CPT 97728 TOTAL TIME FOR SESSION 8-22 Minutes     Pain, Vitals, Meds, Etc. Assessed and monitored.     Pt/daughter share one instance of vertigo/vestibular issues roughly 2 weeks ago over the course of 2-3 nights. No visual complaints reported by pt or family member. Following discussion with pt/daughter, at this time it is not recommended pt pursue an OP OT Neuro evaluation. Pt does not demonstrate nor report visual deficits to support additional therapy services. Pt is in agreement with continuing traditional OP OT to address functional goals. Pt will monitor vertigo symptoms and seek PT evaluation for further assessment should this issue continue. This therapist contacted primary PT for awareness of concern.     HEP     Functional Mobility CLS with rollator     Transfers     THERE EX  CPT 71313 TOTAL TIME FOR SESSION Not  performed   PROM     Activity Tolerance     SELF-CARE  CPT 50569 TOTAL TIME FOR SESSION Not performed   Pt Education/Safety     ADL Training     IADL Training     MODALITIES  CPT 26941 TOTAL TIME FOR SESSION Not performed   Ice/Heat     ATTENDED E-STIM  CPT 83494 TOTAL TIME FOR SESSION Not performed   Attended E-Stim     SPLINTING  CPT 45959 TOTAL TIME FOR SESSION Not performed   Fabrication/Check Out     Fit     Training     GROUP  CPT 73743 TOTAL TIME FOR SESSION Not performed

## 2024-07-23 NOTE — OP PT TREATMENT LOG
ORTHO PT FLOWSHEET    Y/N Exercises Current Session Time    MODALITIES- HEAT/ICE  CPT 13668 TOTAL TIME FOR SESSION Not performed    Heat     Ice/Vasocompression     Mechanical Traction     THER ACT  CPT 76902 TOTAL TIME FOR SESSION 10 minutes    Pt Education Pt educated re: POC, objective findings, importance of attendance and performance of HEP.    Transfer training    y Pain, falls, meds and vitals Monitored pre rx, and post visit     THER EX  CPT 89505 TOTAL TIME FOR SESSION 50 minutes    CARDIOVASCULAR      HEP INSTRUCTION Instructed in primary HEP consisting of bridtes, bkfo, quad sets, glut sets.  Given exercise pro sheet and emailed to pts daughter.  See copy  below flow sheet.     Y NUSTEP seat #12 arms #12 Level 1 x 14 minutes Aunt Kitchen Bike     Elliptical     Treadmill     STRENGTHENING          Y  Y  Y  Y  Y  Y Supine ther-ex  Quad sets  Glut sets  Clamshells  BKFO  Marches  Bridges  SAQ  SLR   1 x 10 5 sec  1 x 10 5 sec  2 x 10 3 sec  2 x 10 3 sec   OTB  2 x 10 3 sec  OTB  2 x 10 3 sec  2 x 10 3 sec   1# B  2 x 10 slow lowering  1# B     Y Seated ther-ex   Sit to stand   2 x 10     Y  Y  Y  Y  n Standing ther-ex  HR/TR  Hip flex  Hip ext  Hip abd  Mini squats   2 x 10  2 x 10  2 x 10  2 x 10      STRETCHING      resume LE stretching   HS strap stretch   2 x 20 seconds    Other stretching     REPEATED MOVEMENTS     NEURO RE-ED  CPT 39020 TOTAL TIME FOR SESSION Billed with TE    COORDINATION     POSTURAL RE-ED        PRE-GAIT ACTIVITIES      BALANCE TRAINING      Sitting balance      Y  Y   Standing balance  FOAM marching  SLS  Modified tandem stance   10x  10x  10 sec x 3 B  20 sec x 3 B    GAIT TRAINING  CPT 01751 TOTAL TIME FOR SESSION Not performed     nv  nv Ambulation   Even terrain with SPC and no AD Uneven terrain w/SPC    ft x 2 w/cg due to vertigo    Dynamic gait     n Stair negotiation 1 flight of steps with B railings and cgx1    Curb negotiation     Ramp  negotiation     Outdoor ambulation     BWS/vector training     MANUAL  CPT 85804 TOTAL TIME FOR SESSION Not performed    Stretching     Mobilization      Massage     Taping      7/9/24

## 2024-07-24 NOTE — PROGRESS NOTES
Physical Therapy Visit    PT DAILY NOTE FOR OUTPATIENT THERAPY    Patient: Jose Fuentes MRN: [id number]  : 1932 89 y.o.  Referring Physician: Sj Chan MD  Date of Visit: 2024    Certification Dates:   through      Diagnosis:   1. Displaced fracture of greater trochanter of right femur, initial encounter for closed fracture (CMS/[facility])    2. Difficulty in walking        Chief Complaints:  decreased strength, decreased ROM, deviated gait, decreased balance    Precautions:   Existing Precautions/Restrictions: cardiac      TODAY'S VISIT    Time In Session:  Start Time: 1702  Stop Time: 1800  Time Calculation (min): 58 min   History/Vitals/Pain/Encounter Info - 24 1714          Injury History/Precautions/Daily Required Info    Document Type daily treatment     Primary Therapist Christine Mulvihill, PT     Chief Complaint/Reason for Visit  decreased strength, decreased ROM, deviated gait, decreased balance     Referring Physician Sj Chan MD     Existing Precautions/Restrictions cardiac     Patient/Family/Caregiver Comments/Observations Jose denies pain.  He has been using his rollator in the community and at home uses rollator or cruises on furnatire.     Patient reported fall since last visit No        Pain Assessment    Currently in pain No/Denies     Preferred Pain Scale number (Numeric Rating Pain Scale)     Pain Side/Orientation right     Pain: Body location Hip     Pain Rating (0-10): Pre Activity 0     Pain Rating (0-10): Post Activity 0        Pre Activity Vital Signs    Pulse 67     SpO2 95 %     Oxygen Therapy None (Room air)     /50     BP Location Left upper arm     BP Method Manual     Patient Position Sitting        Post Activity Vital Signs    Post Activity Pulse 64     Post Activity SpO2 92 %     Post Activity /54     Post Activity BP Location Left upper arm     Post Activity BP Method Manual     Patient Position Sitting                    Daily  Treatment Assessment and Plan - 07/23/24 9797          Daily Treatment Assessment and Plan    Progress toward goals Progressing     Daily Outcome Summary Jose is progressing well however is not ready to ambulate without and AD.  He and his daughter appeared to understand the need to use his rollator until cleared from a vestibular standpoint.     Plan and Recommendations Continue stretching, strengthening and balance. Progress from rollator walker as tolerated.                         OBJECTIVE DATA TAKEN TODAY:    None taken    Today's Treatment:        ORTHO PT FLOWSHEET    Y/N Exercises Current Session Time    MODALITIES- HEAT/ICE  CPT 34906 TOTAL TIME FOR SESSION Not performed    Heat     Ice/Vasocompression     Mechanical Traction     THER ACT  CPT 63602 TOTAL TIME FOR SESSION 10 minutes    Pt Education Pt educated re: POC, objective findings, importance of attendance and performance of HEP.    Transfer training    y Pain, falls, meds and vitals Monitored pre rx, and post visit     THER EX  CPT 13613 TOTAL TIME FOR SESSION 50 minutes    CARDIOVASCULAR      HEP INSTRUCTION Instructed in primary HEP consisting of bridtes, bkfo, quad sets, glut sets.  Given exercise pro sheet and emailed to pts daughter.  See copy  below flow sheet.     Y NUSTEP seat #12 arms #12 Level 1 x 14 minutes Alex and Ani Bike     Elliptical     Treadmill     STRENGTHENING          Y  Y  Y  Y  Y  Y Supine ther-ex  Quad sets  Glut sets  Clamshells  BKFO  Marches  Bridges  SAQ  SLR   1 x 10 5 sec  1 x 10 5 sec  2 x 10 3 sec  2 x 10 3 sec   OTB  2 x 10 3 sec  OTB  2 x 10 3 sec  2 x 10 3 sec   1# B  2 x 10 slow lowering  1# B     Y Seated ther-ex   Sit to stand   2 x 10     Y  Y  Y  Y  n Standing ther-ex  HR/TR  Hip flex  Hip ext  Hip abd  Mini squats   2 x 10  2 x 10  2 x 10  2 x 10      STRETCHING      resume LE stretching   HS strap stretch   2 x 20 seconds    Other stretching     REPEATED MOVEMENTS     NEURO RE-ED  CPT  79450 TOTAL TIME FOR SESSION Billed with TE    COORDINATION     POSTURAL RE-ED        PRE-GAIT ACTIVITIES      BALANCE TRAINING      Sitting balance      Y  Y   Standing balance  FOAM marching  SLS  Modified tandem stance   10x  10x  10 sec x 3 B  20 sec x 3 B    GAIT TRAINING  CPT 66282 TOTAL TIME FOR SESSION Not performed     nv  nv Ambulation   Even terrain with SPC and no AD Uneven terrain w/SPC    ft x 2 w/cg due to vertigo    Dynamic gait     n Stair negotiation 1 flight of steps with B railings and cgx1    Curb negotiation     Ramp negotiation     Outdoor ambulation     BWS/vector training     MANUAL  CPT 68791 TOTAL TIME FOR SESSION Not performed    Stretching     Mobilization      Massage     Taping      7/9/24

## 2024-07-25 ENCOUNTER — HOSPITAL ENCOUNTER (OUTPATIENT)
Dept: PHYSICAL THERAPY | Facility: REHABILITATION | Age: 89
Setting detail: THERAPIES SERIES
Discharge: HOME | End: 2024-07-25
Attending: SPECIALIST
Payer: MEDICARE

## 2024-07-25 ENCOUNTER — HOSPITAL ENCOUNTER (OUTPATIENT)
Dept: OCCUPATIONAL THERAPY | Facility: REHABILITATION | Age: 89
Setting detail: THERAPIES SERIES
Discharge: HOME | End: 2024-07-25
Attending: INTERNAL MEDICINE
Payer: MEDICARE

## 2024-07-25 DIAGNOSIS — S72.111A DISPLACED FRACTURE OF GREATER TROCHANTER OF RIGHT FEMUR, INITIAL ENCOUNTER FOR CLOSED FRACTURE (CMS/HCC): Primary | ICD-10-CM

## 2024-07-25 DIAGNOSIS — Z78.9 DECREASED ACTIVITIES OF DAILY LIVING (ADL): Primary | ICD-10-CM

## 2024-07-25 DIAGNOSIS — R26.2 DIFFICULTY IN WALKING: ICD-10-CM

## 2024-07-25 DIAGNOSIS — Z78.9 IMPAIRED INSTRUMENTAL ACTIVITIES OF DAILY LIVING (IADL): ICD-10-CM

## 2024-07-25 PROCEDURE — 97110 THERAPEUTIC EXERCISES: CPT | Mod: GP

## 2024-07-25 PROCEDURE — 97112 NEUROMUSCULAR REEDUCATION: CPT | Mod: GO

## 2024-07-25 PROCEDURE — 97530 THERAPEUTIC ACTIVITIES: CPT | Mod: GO

## 2024-07-25 PROCEDURE — 97530 THERAPEUTIC ACTIVITIES: CPT | Mod: GP

## 2024-07-25 NOTE — OP OT TREATMENT LOG
OT NEURO FLOW SHEET    EXERCISES CURRENT SESSION TIME   NEURO RE-ED  CPT 36630 TOTAL TIME FOR SESSION 38-52 Minutes  40 minutes   AAROM/AROM     Strengthening Supine Therex addressing pt's reed strength, generalized endurance, activity tolerance and reed integration into tasks. Pt performed the following  - reed chest press with 5# dowel increasing to 9#  - reed shoulder raises, with 9# dowel  - reed hula hoop rotations   - reed abduction claps, 3# dumbbells  - reed UE bicep curls, 5# dowel    Pt tolerated all well reporting increased resistance utilized while at the gym benefits from brief rests although typically declines rest breaks throughout.     Strength     BITs     Gross Motor Control     Fine Motor Control     Sitting Margareth/Balance     Standing Margareth/Balance Standing for engagement in multi step FMC and visual perceptual tasking below. Tolerating lengthy standing trials without UE support, ~15 minutes in total     Retraining Small Pegs and pattern design  Utilized in stance to improve pt's balance, visual perceptual skills, attention, visual memory, reed hand FMC and in-hand manipulation skills to perform the following:  - Standing without UE support  - retrieval of one peg at a time with right hand, midline pass to left for insertion into pegboard   - pattern matching based on below trials    Trail 1: direct copy 100% accuracy  Trial 2: copy from memory 90% accuracy (simple): right shape wrong row  Trial 3: copy from memory 90% accuracy (moderate): right shape, two colors swapped  Trial 4: direct copy 90 degree rotation. 100% accuracy    Added upgrades of visual recall/memory of pattern after study x 1 minute and removal by this therapist. Pt with good enjoyment and success in this task. Reiterates to this therapist that he is a retired  and appreciates design, math and science.    THERE ACT  CPT 52234 TOTAL TIME FOR SESSION 8-22 Minutes  15 minutes   Pain, Vitals, Meds, Etc. Assessed and  "monitored. Pt incidentally shares program from [facility] in which available to borrow or receive permanently dependent on need. Reports he received his rollator (permanently) and wheelchair to borrow. Recommends this program to others, as well as [employer] [facility]    Hx:  Pt/daughter share one instance of vertigo/vestibular issues roughly 2 weeks ago over the course of 2-3 nights. No visual complaints reported by pt or family member. Following discussion with pt/daughter, at this time it is not recommended pt pursue an OP OT Neuro evaluation. Pt does not demonstrate nor report visual deficits to support additional therapy services. Pt is in agreement with continuing traditional OP OT to address functional goals. Pt will monitor vertigo symptoms and seek PT evaluation for further assessment should this issue continue. This therapist contacted primary PT for awareness of concern.     HEP     Functional Mobility CLS with rollator     Transfers     THERE EX  CPT 72946 TOTAL TIME FOR SESSION Not performed   PROM     Activity Tolerance     SELF-CARE  CPT 04380 TOTAL TIME FOR SESSION Not performed   Pt Education/Safety     ADL Training     IADL Training     MODALITIES  CPT 11621 TOTAL TIME FOR SESSION Not performed   Ice/Heat     ATTENDED E-STIM  CPT 65482 TOTAL TIME FOR SESSION Not performed   Attended E-Stim     SPLINTING  CPT 91967 TOTAL TIME FOR SESSION Not performed   Fabrication/Check Out     Fit     Training     GROUP  CPT 16966 TOTAL TIME FOR SESSION Not performed           "

## 2024-07-25 NOTE — OP PT TREATMENT LOG
"    ORTHO PT FLOWSHEET    Y/N Exercises Current Session Time    MODALITIES- HEAT/ICE  CPT 87848 TOTAL TIME FOR SESSION Not performed    Heat     Ice/Vasocompression     Mechanical Traction     THER ACT  CPT 61168 TOTAL TIME FOR SESSION 15 minutes    Pt Education Pt educated re: POC, objective findings, importance of attendance and performance of HEP.    Transfer training      y Pain, falls, meds and vitals Monitored pre rx, and post visit   7/25/24 - performed numerous vitals throughout treatment due to lower than normal reading upon arrival.  Also performed orthostatic readings as per flow sheet    THER EX  CPT 39084 TOTAL TIME FOR SESSION 40 minutes    CARDIOVASCULAR      HEP INSTRUCTION Instructed in primary HEP consisting of bridtes, bkfo, quad sets, glut sets.  Given exercise pro sheet and emailed to pts daughter.  See copy  below flow sheet.     held NUSTEP seat #12 arms #12 Level 1 x 14 minutes [place]     Elliptical     Treadmill     STRENGTHENING        y    y  y  y  y  y  y Supine ther-ex  Quad sets  Glut sets  Clamshells  BKFO  Marches  Bridges  SAQ  SLR  Ball squeezes   1 x 10 5 sec  1 x 10 5 sec  2 x 10 3 sec  2 x 10 3 sec   OTB  2 x 10 3 sec  OTB  2 x 10 3 sec  2 x 10 3 sec   1# B  2 x 10 slow lowering  1# B  2/10   counting out loud     held Seated ther-ex   Sit to stand   2 x 10     nt  Y  Y  y  n Standing ther-ex  HR/TR  Hip flex  Hip ext  Hip abd  Mini squats   2 x 10  2 x 10  2 x 10  2 x 10      STRETCHING    Resume  held  y LE stretching   HS strap stretch  SAUMYA  Frog stretch   2 x 20 seconds  Held SAUMYA due to reports of pain with self stretch  3/30\"    Other stretching     REPEATED MOVEMENTS     NEURO RE-ED  CPT 83405 TOTAL TIME FOR SESSION Billed with TE    COORDINATION     POSTURAL RE-ED        PRE-GAIT ACTIVITIES      BALANCE TRAINING      Sitting balance           Standing balance  FOAM marching  SLS  Modified tandem stance   10x  10x  10 sec x 3 B  20 sec x 3 B "    GAIT TRAINING  CPT 31715 TOTAL TIME FOR SESSION Not performed     nv  nv Ambulation   Even terrain with SPC and no AD Uneven terrain w/SPC    ft x 2 w/cg due to vertigo    Dynamic gait     n Stair negotiation 1 flight of steps with B railings and cgx1    Curb negotiation     Ramp negotiation     Outdoor ambulation     BWS/vector training     MANUAL  CPT 98729 TOTAL TIME FOR SESSION Not performed    Stretching 7/25/24 - Attempted gentle HS and gentle SAUMYA stretch, held due to pain    Mobilization      Massage     Taping      7/9/24

## 2024-07-25 NOTE — PROGRESS NOTES
Physical Therapy Visit    PT DAILY NOTE FOR OUTPATIENT THERAPY    Patient: Jose Fuentes MRN: [id number]  : 1932 89 y.o.  Referring Physician: Sj Chan MD  Date of Visit: 2024    Certification Dates: 24 through 10/01/24    Diagnosis:   1. Displaced fracture of greater trochanter of right femur, initial encounter for closed fracture (CMS/[facility])    2. Difficulty in walking        Chief Complaints:  decreased strength, decreased ROM, deviated gait, decreased balance    Precautions:   Existing Precautions/Restrictions: cardiac  Precautions comments: CAD, tachycardia      TODAY'S VISIT    Time In Session:      History/Vitals/Pain/Encounter Info - 24 1431          Injury History/Precautions/Daily Required Info    Document Type daily treatment     Primary Therapist Christine Mulvihill, PT     Chief Complaint/Reason for Visit  decreased strength, decreased ROM, deviated gait, decreased balance     Referring Physician Sj Chan MD     Existing Precautions/Restrictions cardiac     Precautions comments CAD, tachycardia     Patient/Family/Caregiver Comments/Observations Per pts daughter, he got  peanut butter and bread stuck in his throat this am and his daughter had to do the heimlick on him.. He did not lose consciousness and is feeling ok now, just tired.     Patient reported fall since last visit No        Pain Assessment    Currently in pain No/Denies     Preferred Pain Scale number (Numeric Rating Pain Scale)     Pain Side/Orientation right     Pain Rating (0-10): Pre Activity 0     Pain Rating (0-10): Post Activity 0        Pain Intervention    Intervention  monitored     Post Intervention Comments no pain post visit.  Pt with more fatigue today than normal.  No pain produced.        Pre Activity Vital Signs    Pulse 63     SpO2 94 %     Oxygen Therapy None (Room air)     /44   orthostatic readings:   supine -> sit 100/44 mmHg -> 98/44 mmHg.  1 min later 110/44 mmHg     BP Location Left upper arm     BP Method Manual     Patient Position Sitting        Activity Vital Signs    Patient activity Other (see comments)        Post Activity Vital Signs    Post Activity Pulse 60   regular, no irregularities.  Pt also checked EKG on I watch    Post Activity Oxygen Therapy None (Room air)     Post Activity /50     Post Activity BP Location Left upper arm     Post Activity BP Method Manual     Patient Position Sitting                    Daily Treatment Assessment and Plan - 07/25/24 1431          Daily Treatment Assessment and Plan    Progress toward goals Progressing     Daily Outcome Summary Jose reported general fatigue today.  No complaints of hip pain and minimal complaints of dizziness which was during tranfers, short lived and without significant changes in BP readings.     Plan and Recommendations Continue stretching, strengthening and balance. Continue to monitor BP.   Family opting to keep vestibular IE at this time as they have questions on how to have it not return, cause of dizziness, etc.                         OBJECTIVE DATA TAKEN TODAY:    None taken    Today's Treatment:        ORTHO PT FLOWSHEET    Y/N Exercises Current Session Time    MODALITIES- HEAT/ICE  CPT 57760 TOTAL TIME FOR SESSION Not performed    Heat     Ice/Vasocompression     Mechanical Traction     THER ACT  CPT 52788 TOTAL TIME FOR SESSION 15 minutes    Pt Education Pt educated re: POC, objective findings, importance of attendance and performance of HEP.    Transfer training      y Pain, falls, meds and vitals Monitored pre rx, and post visit   7/25/24 - performed numerous vitals throughout treatment due to lower than normal reading upon arrival.  Also performed orthostatic readings as per flow sheet    THER EX  CPT 62134 TOTAL TIME FOR SESSION 40 minutes    CARDIOVASCULAR      HEP INSTRUCTION Instructed in primary HEP consisting of bridtes, bkfo, quad sets, glut sets.  Given exercise pro sheet and  "emailed to pts daughter.  See copy  below flow sheet.     held NUSTEP seat #12 arms #12 Level 1 x 14 minutes Walkabout    Stationary Bike     Elliptical     Treadmill     STRENGTHENING        y    y  y  y  y  y  y Supine ther-ex  Quad sets  Glut sets  Clamshells  BKFO  Marches  Bridges  SAQ  SLR  Ball squeezes   1 x 10 5 sec  1 x 10 5 sec  2 x 10 3 sec  2 x 10 3 sec   OTB  2 x 10 3 sec  OTB  2 x 10 3 sec  2 x 10 3 sec   1# B  2 x 10 slow lowering  1# B  2/10   counting out loud     held Seated ther-ex   Sit to stand   2 x 10     nt  Y  Y  y  n Standing ther-ex  HR/TR  Hip flex  Hip ext  Hip abd  Mini squats   2 x 10  2 x 10  2 x 10  2 x 10      STRETCHING    Resume  held  y LE stretching   HS strap stretch  SAUMYA  Frog stretch   2 x 20 seconds  Held SAUMYA due to reports of pain with self stretch  3/30\"    Other stretching     REPEATED MOVEMENTS     NEURO RE-ED  CPT 06318 TOTAL TIME FOR SESSION Billed with TE    COORDINATION     POSTURAL RE-ED        PRE-GAIT ACTIVITIES      BALANCE TRAINING      Sitting balance           Standing balance  FOAM marching  SLS  Modified tandem stance   10x  10x  10 sec x 3 B  20 sec x 3 B    GAIT TRAINING  CPT 06179 TOTAL TIME FOR SESSION Not performed     nv  nv Ambulation   Even terrain with SPC and no AD Uneven terrain w/SPC    ft x 2 w/cg due to vertigo    Dynamic gait     n Stair negotiation 1 flight of steps with B railings and cgx1    Curb negotiation     Ramp negotiation     Outdoor ambulation     BWS/vector training     MANUAL  CPT 70977 TOTAL TIME FOR SESSION Not performed    Stretching 7/25/24 - Attempted gentle HS and gentle SAUMYA stretch, held due to pain    Mobilization      Massage     Taping      7/9/24                                                                            "

## 2024-07-25 NOTE — PROGRESS NOTES
Occupational Therapy Visit    OT DAILY NOTE FOR OUTPATIENT THERAPY    Patient: Jose Fuentes   MRN: [id number]  : 1932 89 y.o.  Referring Physician: Sj Chan MD  Date of Visit: 2024      Certification Dates: 24 through 24    Diagnosis:   1. Decreased activities of daily living (ADL)    2. Impaired instrumental activities of daily living (IADL)        Chief Complaints:   ADL dysfunction and return to driving goals s/p fall with right hip fracture    Precautions:  Existing Precautions/Restrictions: fall    TODAY'S VISIT    Time In Session:  Start Time: 1304  Stop Time: 1359  Time Calculation (min): 55 min   History/Vitals/Pain/Encounter Info - 24 1253          Injury History/Precautions/Daily Required Info    Document Type daily treatment     Primary Therapist Kim Bryant MSOTR/L     Chief Complaint/Reason for Visit  ADL dysfunction and return to driving goals s/p fall with right hip fracture     Onset of Illness/Injury or Date of Surgery 24     Referring Physician Grace Stroud MD     Existing Precautions/Restrictions fall     History of present illness/functional impairment Jose Fuentes is a 89-year-old male (patient of Dr. Stroud) being seen for OT evaluation today s/p hospitalization at [facility] (24-24) after a mechanical fall at home with resulting closed fracture of greater trochanter of right femur. He has a PMH significant for HTN, HL, CAD, OA, a-fib. Patient admitted to the hospital for a period of possible unresponsiveness.  He was reportedly difficult to awaken by his wife, but by his reports he was very tired and was in a deep sleep.  He awoke prior to arrival.  He had also been having hip pain after a fall he sustained while trying to prevent his wife from falling.  He was noted to have right greater trochanteric fracture.  Seen by orthopedics, determined that he would respond with nonoperative management. He followed up with ortho  (Dr. Chan) on 6/10/24. Of note, he was admitted to SNF on 5/13/24 and signed himself out AMA one day later. He was evaluated by homecare OT but did not qualify for services.     Patient/Family/Caregiver Comments/Observations pt reports some lightheadedness today, vitals WNL when checked, reports poor sleep yesterday as well as poor hydration, provided with water. reports some concern over swallowing function - reports some choking     Patient reported fall since last visit No        Pain Assessment    Currently in pain No/Denies   reed LE soreness from long walks       Pre Activity Vital Signs    Oxygen Therapy None (Room air)     /60     BP Location Left upper arm     BP Method Manual     Patient Position Sitting                    Daily Treatment Assessment and Plan - 07/25/24 1253          Daily Treatment Assessment and Plan    Progress toward goals Progressing     Daily Outcome Summary pt reported mild light headedness in today's session, provided with hydration and vitals checked (WNL thorughout). pt engaged in supine therapeutic exercise with varying weight equipment to address and improve functional endurance and strength for return to driving goals. see log for details. session continued with static and dynamic standing activities with visual memory and spatial realtions upgrades. pt appreciated this. tolerates stand x15 minutes without need for seated rest break. pt walked out to waiting room to meet daughter and continue on to PT session     Plan and Recommendations Continue OT POC addressing his functional balance, endurance and strength and coordination deficits in order to improve his independence in higher level iADLs and leisure pursuits (travel and hobbies) to improve quality of life. anticipating ongoing driving simulator in upcoming sessions                         OBJECTIVE DATA TAKEN TODAY    None Taken    Today's Treatment:    OT NEURO FLOW SHEET    EXERCISES CURRENT SESSION TIME   NEURO  RE-ED  CPT 86960 TOTAL TIME FOR SESSION 38-52 Minutes  40 minutes   AAROM/AROM     Strengthening Supine Therex addressing pt's reed strength, generalized endurance, activity tolerance and reed integration into tasks. Pt performed the following  - reed chest press with 5# dowel increasing to 9#  - reed shoulder raises, with 9# dowel  - reed hula hoop rotations   - reed abduction claps, 3# dumbbells  - reed UE bicep curls, 5# dowel    Pt tolerated all well reporting increased resistance utilized while at the gym benefits from brief rests although typically declines rest breaks throughout.     Strength     BITs     Gross Motor Control     Fine Motor Control     Sitting Margareth/Balance     Standing Margareth/Balance Standing for engagement in multi step FMC and visual perceptual tasking below. Tolerating lengthy standing trials without UE support, ~15 minutes in total     Retraining Small Pegs and pattern design  Utilized in stance to improve pt's balance, visual perceptual skills, attention, visual memory, reed hand FMC and in-hand manipulation skills to perform the following:  - Standing without UE support  - retrieval of one peg at a time with right hand, midline pass to left for insertion into pegboard   - pattern matching based on below trials    Trail 1: direct copy 100% accuracy  Trial 2: copy from memory 90% accuracy (simple): right shape wrong row  Trial 3: copy from memory 90% accuracy (moderate): right shape, two colors swapped  Trial 4: direct copy 90 degree rotation. 100% accuracy    Added upgrades of visual recall/memory of pattern after study x 1 minute and removal by this therapist. Pt with good enjoyment and success in this task. Reiterates to this therapist that he is a retired  and appreciates design, math and science.    THERE ACT  CPT 88218 TOTAL TIME FOR SESSION 8-22 Minutes  15 minutes   Pain, Vitals, Meds, Etc. Assessed and monitored. Pt incidentally shares program from [facility] in  "which available to borrow or receive permanently dependent on need. Reports he received his rollator (permanently) and wheelchair to borrow. Recommends this program to others, as well as [employer] [facility]    Hx:  Pt/daughter share one instance of vertigo/vestibular issues roughly 2 weeks ago over the course of 2-3 nights. No visual complaints reported by pt or family member. Following discussion with pt/daughter, at this time it is not recommended pt pursue an OP OT Neuro evaluation. Pt does not demonstrate nor report visual deficits to support additional therapy services. Pt is in agreement with continuing traditional OP OT to address functional goals. Pt will monitor vertigo symptoms and seek PT evaluation for further assessment should this issue continue. This therapist contacted primary PT for awareness of concern.     HEP     Functional Mobility CLS with rollator     Transfers     THERE EX  CPT 03030 TOTAL TIME FOR SESSION Not performed   PROM     Activity Tolerance     SELF-CARE  CPT 35811 TOTAL TIME FOR SESSION Not performed   Pt Education/Safety     ADL Training     IADL Training     MODALITIES  CPT 60741 TOTAL TIME FOR SESSION Not performed   Ice/Heat     ATTENDED E-STIM  CPT 79160 TOTAL TIME FOR SESSION Not performed   Attended E-Stim     SPLINTING  CPT 07116 TOTAL TIME FOR SESSION Not performed   Fabrication/Check Out     Fit     Training     GROUP  CPT 48416 TOTAL TIME FOR SESSION Not performed                                  "

## 2024-07-29 ENCOUNTER — HOSPITAL ENCOUNTER (OUTPATIENT)
Dept: PHYSICAL THERAPY | Facility: REHABILITATION | Age: 89
Setting detail: THERAPIES SERIES
Discharge: HOME | End: 2024-07-29
Attending: SPECIALIST
Payer: MEDICARE

## 2024-07-29 ENCOUNTER — HOSPITAL ENCOUNTER (OUTPATIENT)
Dept: OCCUPATIONAL THERAPY | Facility: REHABILITATION | Age: 89
Setting detail: THERAPIES SERIES
Discharge: HOME | End: 2024-07-29
Attending: INTERNAL MEDICINE
Payer: MEDICARE

## 2024-07-29 DIAGNOSIS — Z78.9 IMPAIRED INSTRUMENTAL ACTIVITIES OF DAILY LIVING (IADL): ICD-10-CM

## 2024-07-29 DIAGNOSIS — S72.111A DISPLACED FRACTURE OF GREATER TROCHANTER OF RIGHT FEMUR, INITIAL ENCOUNTER FOR CLOSED FRACTURE (CMS/HCC): ICD-10-CM

## 2024-07-29 DIAGNOSIS — R26.2 DIFFICULTY IN WALKING: Primary | ICD-10-CM

## 2024-07-29 DIAGNOSIS — Z78.9 DECREASED ACTIVITIES OF DAILY LIVING (ADL): Primary | ICD-10-CM

## 2024-07-29 PROCEDURE — 97112 NEUROMUSCULAR REEDUCATION: CPT | Mod: GP

## 2024-07-29 PROCEDURE — 97530 THERAPEUTIC ACTIVITIES: CPT | Mod: GO

## 2024-07-29 PROCEDURE — 97110 THERAPEUTIC EXERCISES: CPT | Mod: GP

## 2024-07-29 PROCEDURE — 97112 NEUROMUSCULAR REEDUCATION: CPT | Mod: GO

## 2024-07-29 PROCEDURE — 97535 SELF CARE MNGMENT TRAINING: CPT | Mod: GO

## 2024-07-29 PROCEDURE — 97530 THERAPEUTIC ACTIVITIES: CPT | Mod: GP

## 2024-07-29 ASSESSMENT — GAIT ASSESSMENTS
TUG TIME: 13.5
TUG TIME: 13.5
TUG TIME: 14
TUG TIME: 13

## 2024-07-29 NOTE — PROGRESS NOTES
Occupational Therapy Visit    OT DAILY NOTE FOR OUTPATIENT THERAPY    Patient: Jose Fuentes   MRN: [id number]  : 1932 89 y.o.  Referring Physician: Sj Chan MD  Date of Visit: 2024      Certification Dates: 24 through 24    Diagnosis:   1. Decreased activities of daily living (ADL)    2. Impaired instrumental activities of daily living (IADL)        Chief Complaints:   ADL dysfunction and return to driving goals s/p fall with right hip fracture    Precautions:  Existing Precautions/Restrictions: fall    TODAY'S VISIT    Time In Session:  Start Time: 1303  Stop Time: 1400  Time Calculation (min): 57 min   History/Vitals/Pain/Encounter Info - 24 1306          Injury History/Precautions/Daily Required Info    Document Type daily treatment     Primary Therapist Kim Bryant MSOTR/L     Chief Complaint/Reason for Visit  ADL dysfunction and return to driving goals s/p fall with right hip fracture     Onset of Illness/Injury or Date of Surgery 24     Referring Physician Grace Stroud MD     Existing Precautions/Restrictions fall     History of present illness/functional impairment Jose Fuentes is a 89-year-old male (patient of Dr. Stroud) being seen for OT evaluation today s/p hospitalization at [facility] [address] (24-24) after a mechanical fall at home with resulting closed fracture of greater trochanter of right femur. He has a PMH significant for HTN, HL, CAD, OA, a-fib. Patient admitted to the hospital for a period of possible unresponsiveness.  He was reportedly difficult to awaken by his wife, but by his reports he was very tired and was in a deep sleep.  He awoke prior to arrival.  He had also been having hip pain after a fall he sustained while trying to prevent his wife from falling.  He was noted to have right greater trochanteric fracture.  Seen by orthopedics, determined that he would respond with nonoperative management. He followed up with ortho  "(Dr. Chan) on 6/10/24. Of note, he was admitted to SNF on 5/13/24 and signed himself out AMA one day later. He was evaluated by homecare OT but did not qualify for services.     Patient/Family/Caregiver Comments/Observations pt recevied in waiting room denies changes     Patient reported fall since last visit No        Pain Assessment    Currently in pain No/Denies        Pre Activity Vital Signs    Pulse 62     SpO2 98 %     Oxygen Therapy None (Room air)     /42     BP Location Right upper arm     BP Method Manual     Patient Position Sitting                    Daily Treatment Assessment and Plan - 07/29/24 1317          Daily Treatment Assessment and Plan    Progress toward goals Progressing     Daily Outcome Summary today's session continued proximal UE strengthening on UBE scifit with upgraded resistance and time with pt demonstrating independent direction changes. session also progressed pt in driving simulator scenarios addressing pedals and stopping, left and right turns. pt with some diffiuclty throughout due to inconsistent management of pedal and unfamiliarity with \"video games/images etc.\" will continue to address. pt walked out after session and left in company of his daughter - has 1 hour gap before PT session     Plan and Recommendations Continue OT POC addressing his functional balance, endurance and strength and coordination deficits in order to improve his independence in higher level iADLs and leisure pursuits (travel and hobbies) to improve quality of life. anticipating ongoing driving simulator in upcoming sessions                         OBJECTIVE DATA TAKEN TODAY    None Taken    Today's Treatment:    OT NEURO FLOW SHEET    EXERCISES CURRENT SESSION TIME   NEURO RE-ED  CPT 60021 TOTAL TIME FOR SESSION 8-22 Minutes  12 minutes   AAROM/AROM     Strengthening UBE SCIFIT addressing pt's reed strength, generalized endurance, activity tolerance and reed integration into tasks. Pt performed the " "following. With dual tasking requirements with pt changing direction every 3 minutes for a total of 12 minutes time on level 2.  Completed in sitting position and maintained >45RPM throughout for 1.29 miles distance    Pt tolerated all well reporting increased resistance utilized while at the gym benefits from brief rests although typically declines rest breaks throughout.     Strength     BITs     Gross Motor Control     Fine Motor Control     Sitting Margareth/Balance     Standing Margareth/Balance      Retraining     THERE ACT  CPT 74462 TOTAL TIME FOR SESSION 8-22 Minutes  10 minutes   Pain, Vitals, Meds, Etc. Assessed and monitored.     Hx:  Pt incidentally shares program from [facility] in which available to borrow or receive permanently dependent on need. Reports he received his rollator (permanently) and wheelchair to borrow. Recommends this program to others, as well as [place] and the \"e\"gym at the     Hx:  Pt/daughter share one instance of vertigo/vestibular issues roughly 2 weeks ago over the course of 2-3 nights. No visual complaints reported by pt or family member. Following discussion with pt/daughter, at this time it is not recommended pt pursue an OP OT Neuro evaluation. Pt does not demonstrate nor report visual deficits to support additional therapy services. Pt is in agreement with continuing traditional OP OT to address functional goals. Pt will monitor vertigo symptoms and seek PT evaluation for further assessment should this issue continue. This therapist contacted primary PT for awareness of concern.     HEP     Functional Mobility CLS with rollator to and from  Rehab suite    Transfers     THERE EX  CPT 68516 TOTAL TIME FOR SESSION Not performed   PROM     Activity Tolerance     SELF-CARE  CPT 89351 TOTAL TIME FOR SESSION 23-37 Minutes  35 minutes   Pt Education/Safety Pt reports license was not reported to [employer] and is refraining on his own for safety reasons. Mentions his license " "is due for renewal and will be having a repeat eye exam to renew.     ADL Training     IADL Training [place] Drive Safely Simulator continued today as preparation for increased community mobility independence and problem solving for readiness to return to driving. These scores are not normalized, thus comparison will be made between patients performance from session to session as well as therapist expected performance based on in-clinic utilization. Set up is typical without use of adaptations, unless otherwise noted.     Pedals and Stopping  - X 6 different single stops at lights or intersections with goal to progress patient's awareness of pedal operation and motor control of LE/UEs for vehicle operation. Performance included in general good karina keeping and attention to posted signs after single cue to attention to speed posted on the neumann. Pt with some difficulty adjusting to poor \"coasting\" ability of simulator. Improved with time. Pt managed right curve without incident with 5/5 accuracy. Some \"lead-footedness\" demonstrated and right foot slipping off pedal x2    Stop distances: -9.1ft, -6.4ft, -11.9ft, -4.8ft, -4.7ft    Left Hand Turns   - x 6 turns with speed control of the first 2 turns. Overall performance of 4/6 accuracy. Performance included some difficulty staying within karina with initial stops due to perceptual challenge with simulation. Ongoing successful stop distances throughout. Required cue for getting into left karina to make left turns. Somewhat jerky turns made    Low accuracy for turns due to poor use of turn signal and making left turns into right lanes at times. Pt's right foot continued to slip off accelerator in current outdoor shoes.     Right Hand Turns   - x 6 turns with speed control of the first 2 turns. Overall performance of 3/4 accuracy. Performance included improved management of turns in to right karina and improved use of turn signal.     MODALITIES  CPT 56830 TOTAL TIME FOR SESSION " Not performed   Ice/Heat     ATTENDED E-STIM  CPT 29897 TOTAL TIME FOR SESSION Not performed   Attended E-Stim     SPLINTING  CPT 67761 TOTAL TIME FOR SESSION Not performed   Fabrication/Check Out     Fit     Training     GROUP  CPT 93819 TOTAL TIME FOR SESSION Not performed

## 2024-07-29 NOTE — OP PT TREATMENT LOG
"    ORTHO PT FLOWSHEET    Y/N Exercises Current Session Time    MODALITIES- HEAT/ICE  CPT 93427 TOTAL TIME FOR SESSION Not performed    Heat     Ice/Vasocompression     Mechanical Traction     THER ACT  CPT 59458 TOTAL TIME FOR SESSION 20 minutes    Pt Education Pt educated re: POC, objective findings, importance of attendance and performance of HEP.  7/29/24 pt and pts daughter educated re:  PN results and plan for continued PT.  If vestibular PT indicated and cause of balance deficits and gait dysfunction will discuss need for ortho and vestibular PT simultaneously.    Transfer training      y Pain, falls, meds and vitals Monitored pre rx, and post visit   7/25/24 - performed numerous vitals throughout treatment due to lower than normal reading upon arrival.  Also performed orthostatic readings as per flow sheet    THER EX  CPT 12899 TOTAL TIME FOR SESSION 25 minutes    CARDIOVASCULAR      HEP INSTRUCTION Instructed in primary HEP consisting of bridtes, bkfo, quad sets, glut sets.  Given exercise pro sheet and emailed to pts daughter.  See copy  below flow sheet.     held NUSTEP seat #12 arms #12 Level 1 x 14 minutes [place]     Elliptical     Treadmill     STRENGTHENING        y    y  y  y  y  y  y Supine ther-ex  Quad sets  Glut sets  Clamshells  BKFO  Marches  Bridges  SAQ  SLR  Ball squeezes   1 x 10 5 sec  1 x 10 5 sec  2 x 10 3 sec  2 x 10 3 sec   OTB  2 x 10 3 sec  OTB  2 x 10 3 sec  2 x 10 3 sec   1# B  2 x 10 slow lowering  1# B  2/10   counting out loud     held Seated ther-ex   Sit to stand   2 x 10     nt  Y  Y  y  n Standing ther-ex  HR/TR  Hip flex  Hip ext  Hip abd  Mini squats   2 x 10  2 x 10   2#  2 x 10  2#  2 x 10   2#      STRETCHING    Resume  held  y LE stretching   HS strap stretch  SAUMYA  Frog stretch   2 x 20 seconds  Held SAUMYA due to reports of pain with self stretch  3/30\"    Other stretching     REPEATED MOVEMENTS     NEURO RE-ED  CPT 00298 TOTAL TIME FOR " SESSION 10 min    COORDINATION     POSTURAL RE-ED        PRE-GAIT ACTIVITIES      BALANCE TRAINING      Sitting balance        Y  Y Standing balance  FOAM marching  SLS  Modified tandem stance   20x  30 sec x 3 B  30 sec x 3 B    GAIT TRAINING  CPT 69249 TOTAL TIME FOR SESSION 5 min     Y  nv Ambulation   Even terrain with SPC and no AD Uneven terrain w/SPC   SPC with 2 MWT    Dynamic gait     n Stair negotiation 1 flight of steps with B railings and [id number]    Curb negotiation     Ramp negotiation     Outdoor ambulation     BWS/vector training     MANUAL  CPT 59702 TOTAL TIME FOR SESSION Not performed    Stretching 7/25/24 - Attempted gentle HS and gentle SAUMYA stretch, held due to pain    Mobilization      Massage     Taping      7/9/24

## 2024-07-29 NOTE — OP OT TREATMENT LOG
"OT NEURO FLOW SHEET    EXERCISES CURRENT SESSION TIME   NEURO RE-ED  CPT 33081 TOTAL TIME FOR SESSION 8-22 Minutes  12 minutes   AAROM/AROM     Strengthening UBE SCIFIT addressing pt's reed strength, generalized endurance, activity tolerance and reed integration into tasks. Pt performed the following. With dual tasking requirements with pt changing direction every 3 minutes for a total of 12 minutes time on level 2.  Completed in sitting position and maintained >45RPM throughout for 1.29 miles distance    Pt tolerated all well reporting increased resistance utilized while at the gym benefits from brief rests although typically declines rest breaks throughout.     Strength     BITs     Gross Motor Control     Fine Motor Control     Sitting Margareth/Balance     Standing Margareth/Balance      Retraining     THERE ACT  CPT 30814 TOTAL TIME FOR SESSION 8-22 Minutes  10 minutes   Pain, Vitals, Meds, Etc. Assessed and monitored.     Hx:  Pt incidentally shares program from [facility] in which available to borrow or receive permanently dependent on need. Reports he received his rollator (permanently) and wheelchair to borrow. Recommends this program to others, as well as [place] and the \"e\"gym at the     Hx:  Pt/daughter share one instance of vertigo/vestibular issues roughly 2 weeks ago over the course of 2-3 nights. No visual complaints reported by pt or family member. Following discussion with pt/daughter, at this time it is not recommended pt pursue an OP OT Neuro evaluation. Pt does not demonstrate nor report visual deficits to support additional therapy services. Pt is in agreement with continuing traditional OP OT to address functional goals. Pt will monitor vertigo symptoms and seek PT evaluation for further assessment should this issue continue. This therapist contacted primary PT for awareness of concern.     HEP     Functional Mobility CLS with rollator to and from  Rehab suite    Transfers     THERE " "EX  CPT 73279 TOTAL TIME FOR SESSION Not performed   PROM     Activity Tolerance     SELF-CARE  CPT 17333 TOTAL TIME FOR SESSION 23-37 Minutes  35 minutes   Pt Education/Safety Pt reports license was not reported to [employer] and is refraining on his own for safety reasons. Mentions his license is due for renewal and will be having a repeat eye exam to renew.     ADL Training     IADL Training [facility] Drive Safely Simulator continued today as preparation for increased community mobility independence and problem solving for readiness to return to driving. These scores are not normalized, thus comparison will be made between patients performance from session to session as well as therapist expected performance based on in-clinic utilization. Set up is typical without use of adaptations, unless otherwise noted.     Pedals and Stopping  - X 6 different single stops at lights or intersections with goal to progress patient's awareness of pedal operation and motor control of LE/UEs for vehicle operation. Performance included in general good karina keeping and attention to posted signs after single cue to attention to speed posted on the neumann. Pt with some difficulty adjusting to poor \"coasting\" ability of simulator. Improved with time. Pt managed right curve without incident with 5/5 accuracy. Some \"lead-footedness\" demonstrated and right foot slipping off pedal x2    Stop distances: -9.1ft, -6.4ft, -11.9ft, -4.8ft, -4.7ft    Left Hand Turns   - x 6 turns with speed control of the first 2 turns. Overall performance of 4/6 accuracy. Performance included some difficulty staying within karina with initial stops due to perceptual challenge with simulation. Ongoing successful stop distances throughout. Required cue for getting into left karina to make left turns. Somewhat jerky turns made    Low accuracy for turns due to poor use of turn signal and making left turns into right lanes at times. Pt's right foot continued to slip off " accelerator in current outdoor shoes.     Right Hand Turns   - x 6 turns with speed control of the first 2 turns. Overall performance of 3/4 accuracy. Performance included improved management of turns in to right karina and improved use of turn signal.     MODALITIES  CPT 16715 TOTAL TIME FOR SESSION Not performed   Ice/Heat     ATTENDED E-STIM  CPT 63815 TOTAL TIME FOR SESSION Not performed   Attended E-Stim     SPLINTING  CPT 02500 TOTAL TIME FOR SESSION Not performed   Fabrication/Check Out     Fit     Training     GROUP  CPT 50587 TOTAL TIME FOR SESSION Not performed

## 2024-08-01 NOTE — ADDENDUM NOTE
Encounter addended by: Mulvihill, Christine Blair, PT on: 7/31/2024 11:06 PM   Actions taken: Patient Goal modified, Clinical Note Signed

## 2024-08-01 NOTE — PROGRESS NOTES
"Physical Therapy Progress Note    PT PROGRESS NOTE FOR OUTPATIENT THERAPY    Patient: Jose Fuentes   MRN: [id number]  : 1932 89 y.o.    Referring Physician: Sj Chan MD  Date of Visit: 2024    Certification Dates: 24 through 10/01/24    Recommended Frequency & Duration:  3 times/week for up to 3 months        Diagnosis:   1. Difficulty in walking    2. Displaced fracture of greater trochanter of right femur, initial encounter for closed fracture (CMS/Former[facility])        Chief Complaints:  No chief complaint on file.      Precautions:   Existing Precautions/Restrictions: cardiac  Precautions comments: CAD, tachycardia    TODAY'S VISIT:    Time In Session:  Start Time: 0300  Stop Time: 040  Time Calculation (min): 60 min   General Information - 24 1503          Session Details    Document Type progress note     Mode of Treatment individual therapy        General Information    Referring Physician Sj Chan MD     Patient/Family/Caregiver Comments/Observations Pt notes that he is walking alot more than he used to.  He is getting stronger overall.  He has walked up to a half a mile.  Goes up and down the stairs 2-3 times per day and is \"used to it\".  Uses bilateral handrails and up reciprocally and down with step to pattern     Existing Precautions/Restrictions cardiac     Precautions comments CAD, tachycardia                      Pain/Vitals - 24 1503          Pain Assessment    Currently in pain No/Denies     Preferred Pain Scale number (Numeric Rating Pain Scale)     Pain Rating (0-10): Pre Activity 0     Pain Rating (0-10): Post Activity 0        Pre Activity Vital Signs    Pulse 65     SpO2 94 %     Oxygen Therapy None (Room air)     /58        Post Activity Vital Signs    Post Activity Oxygen Therapy None (Room air)                    PT - 24 2302          Physical Therapy    Physical Therapy Specialty Ortho and Sports PT        PT Plan    Frequency of " treatment 3 times/week     PT Duration 3 months     PT Cert From 07/01/24     PT Cert To 10/01/24     Date PT POC was sent to provider 07/03/24     Signed PT Plan of Care received?  Yes                    Assessment and Plan - 07/29/24 4615          Assessment    Plan of Care reviewed and patient/family in agreement Yes     System Pathology/Pathophysiology Noted musculoskeletal     Functional Limitations in Following Categories (PT Eval) community/leisure;home management     Rehab Potential/Prognosis good, to achieve stated therapy goals     Problem List decreased flexibility;decreased strength;impaired balance;decreased ROM     Actions taken monitored     Clinical Assessment Jose is making steady progress towards attainment of his orthopedic goals.  His LEFS has improved from 33 to 41% disabled and STS testing improved by 2 repetitions.  He is walking with improved balance with his SPC however will benefit from continued use of rollator until cleared by vestibular therapy.  His strength and ROM is progressing and he is making progress towards attainment of goals as set from IE and will benefit from continued skilled PT intervention to progress balance and improve overall gait.  If balance and gait issues are determined to be vestibular in nature, requiring skilled PT intervention, combination orthopedic PT may be able to be included in vestibular PT.  If no vestibular PT required, recommend continued ortho PT to address these issues.     Plan and Recommendations Continuation of skilled PT intervention with focus on gait and balance.  Pt to be evaluated by vestibular PT this week - if balance and gait issues are vestibular in nature, consider shifting from ortho to vestibular PT.                     OBJECTIVE MEASUREMENTS/DATA:    ROM    Range of Motion - 07/29/24 1500          RIGHT: Lower Extremity AROM Assessment    Hip Flexion   108 degrees     Hip Abduction   28 degrees     Knee Flexion   130     Knee  Extension   0                   MMT    Manual Muscle Tests - 07/29/24 1500          RIGHT: Lower Extremity Manual Muscle Test Assessment    Hip Flexion gross movement (4/5) good     Hip Abduction gross movement (4+/5) good plus     Hip Adduction gross movement (4/5) good     Knee Flexion strength (4+/5) good plus     Knee Extension strength (4+/5) good plus        LEFT: Lower Extremity Manual Muscle Test Assessment    Hip Flexion gross movement (4+/5) good plus     Hip Abduction gross movement (5/5) normal     Hip Adduction gross movement (4+/5) good plus     Knee Flexion strength (5/5) normal     Knee Extension strength (5/5) normal                   Palpation   Balance/Posture    Balance and Posture - 07/29/24 2258          Balance Assessment    Balance Assessment standing static balance     Comment, Balance SLS  (L) 1 second, (R) 4 second,  Tandem stance   (L) 7.9 sec,  (R) 3.6 seconds                   Gait and Mobility    Gait and Mobility - 07/29/24 2300          Gait Training    Collin, Gait modified independence     Variable surfaces Flat surface     Assistive Device cane, straight     Distance in Feet 200 feet     Comment decreased stride length, occasional decreased right dorsiflexion with ability to self correct to not trip, slow guarded movements                     ROM and MMT          7/1/2024    16:00 7/29/2024    15:00   PT LE ROM Measurements   PROM: Right Hip Flexion 34 degrees       (-) SLR    AROM: Right Hip Flexion 95 degrees 108 degrees   PROM: Left Hip Flexion 38 degrees       SLR (-)    AROM: Left Hip Flexion 108 degrees    AROM: Right Hip ABD 15 degrees 28 degrees   AROM: Left Hip ABD 16 degrees    AROM: Right Hip ADD 0 degrees    AROM: Left Hip ADD 0 degrees    AROM: Right Knee Flexion 120 130   AROM: Left Knee Flexion 120    AROM: Right Knee Extension -8 0   AROM: Left Knee Extension -7    PT LE MMT   Right Hip Flexion  (4/5) good   Left Hip Flexion  (4+/5) good plus   Right Hip ABD   (4+/5) good plus   Left Hip ABD  (5/5) normal   Right Hip ADD  (4/5) good   Left Hip ADD  (4+/5) good plus   Right Knee Flexion  (4+/5) good plus   Left Knee Flexion  (5/5) normal   Right Knee Extension  (4+/5) good plus   Left Knee Extension  (5/5) normal     Outcome Measures          7/1/2024    14:49 7/29/2024    15:19   PT OBJECTIVE Outcome Measures   2 Minute Walk Test 313 feet with SPC and close S 348 ft w/ SPC close S   (313 feet with SPC and close S)   30 Second Sit to Stand 4 repetitions       with use of B UEs and unable to perform repetitions past 25 seconds 6 repetitions   TUG (Mean)  13.5   TUG - Results  performed with SPC with close S   PT SUBJECTIVE Outcome Measures   LEFS 26/80 =  33% functioning 33/80  =   41% functioning, 59% disabled   PSFS % Score 15 % 15 %       Today's Treatment::    Education provided:  Yes: See treatment log for details of education provided  Methods: Discussion and Demonstration  Readiness: eager  Response: Needs reinforcement        ORTHO PT FLOWSHEET    Y/N Exercises Current Session Time    MODALITIES- HEAT/ICE  CPT 56208 TOTAL TIME FOR SESSION Not performed    Heat     Ice/Vasocompression     Mechanical Traction     THER ACT  CPT 17174 TOTAL TIME FOR SESSION 20 minutes    Pt Education Pt educated re: POC, objective findings, importance of attendance and performance of HEP.  7/29/24 pt and pts daughter educated re:  PN results and plan for continued PT.  If vestibular PT indicated and cause of balance deficits and gait dysfunction will discuss need for ortho and vestibular PT simultaneously.    Transfer training      y Pain, falls, meds and vitals Monitored pre rx, and post visit   7/25/24 - performed numerous vitals throughout treatment due to lower than normal reading upon arrival.  Also performed orthostatic readings as per flow sheet    THER EX  CPT 13725 TOTAL TIME FOR SESSION 25 minutes    CARDIOVASCULAR      HEP INSTRUCTION Instructed in primary HEP consisting of  "bridtes, bkfo, quad sets, glut sets.  Given exercise pro sheet and emailed to pts daughter.  See copy  below flow sheet.     held NUSTEP seat #12 arms #12 Level 1 x 14 minutes [employer]    Stationary Bike     Elliptical     Treadmill     STRENGTHENING        y    y  y  y  y  y  y Supine ther-ex  Quad sets  Glut sets  Clamshells  BKFO  Marches  Bridges  SAQ  SLR  Ball squeezes   1 x 10 5 sec  1 x 10 5 sec  2 x 10 3 sec  2 x 10 3 sec   OTB  2 x 10 3 sec  OTB  2 x 10 3 sec  2 x 10 3 sec   1# B  2 x 10 slow lowering  1# B  2/10   counting out loud     held Seated ther-ex   Sit to stand   2 x 10     nt  Y  Y  y  n Standing ther-ex  HR/TR  Hip flex  Hip ext  Hip abd  Mini squats   2 x 10  2 x 10   2#  2 x 10  2#  2 x 10   2#      STRETCHING    Resume  held  y LE stretching   HS strap stretch  SAUMYA  Frog stretch   2 x 20 seconds  Held SAUMYA due to reports of pain with self stretch  3/30\"    Other stretching     REPEATED MOVEMENTS     NEURO RE-ED  CPT 97184 TOTAL TIME FOR SESSION 10 min    COORDINATION     POSTURAL RE-ED        PRE-GAIT ACTIVITIES      BALANCE TRAINING      Sitting balance        Y  Y Standing balance  FOAM marching  SLS  Modified tandem stance   20x  30 sec x 3 B  30 sec x 3 B    GAIT TRAINING  CPT 39145 TOTAL TIME FOR SESSION 5 min     Y  nv Ambulation   Even terrain with SPC and no AD Uneven terrain w/SPC   SPC with 2 MWT    Dynamic gait     n Stair negotiation 1 flight of steps with B railings and cgx1    Curb negotiation     Ramp negotiation     Outdoor ambulation     BWS/vector training     MANUAL  CPT 47081 TOTAL TIME FOR SESSION Not performed    Stretching 7/25/24 - Attempted gentle HS and gentle SAUMYA stretch, held due to pain    Mobilization      Massage     Taping      7/9/24                                                        Goals Addressed                   This Visit's Progress     OP PT RIGHT HIP FRACTURE LONG TERM GOALS        Long term goals  - hip fracture  Long Term Goals Time " Frame Result Comment/Progress   Pt will increase right LE  MMT into flexion, abduction, ER and IR >/= full grade 8-12 weeks progress    Pt with AROM of right hip to wfls to allow for return to all preexisting adls.  8-12 weeks progress    Pt with ability to ambulate moderate community distances  without AD or with B walking sticks with minimal difficulties and brief rest periods.   8-12 weeks progress    Increase LEFS >/= to 60-70% functioning to indicate overall  increase functioning 8-12 weeks progress    Improve 2 MWT to 492 feet to meet age related norms to indicate increased endurance and gait speed. 8-12 weeks progress    Increase TUG to 13.5 sec or greater  to decrease fall risk 8-12 weeks nt    Pt will be able to transfer sit <-> stand from a normal chair without use of B Ues to indicate overall improvement in LE strength 8-12 weeks nt    Pt will have complaints of 1/10 pain decreased by 75% or greater. 8-12 weeks MET    Pt will be able to ascend/descent 12 steps with reciprocal pattern with 1 railing 8-12 weeks MET    Pt will be able to ambulate with B walking sticks on uneven terrain without difficulties. 8-12 weeks progress         OP PT RIGHT HIP SHORT TERM GOALS 2024        Short term goals {[facility] PT    Short Term Goals Time Frame Result Comment/Progress   TUG test  MET    Pt will increase R LE  MMT into flexion, abduction, ER and IR >/= ½ grade 4-6 weeks progress    Pt will increase right knee ROM >/= 10 degrees into flexion, extension to improve functional mobility 4-6 weeks MET    Pt will be able to ambulate short community distances with SPC with close S without difficulties. 4-6 weeks progress Using rollator until cleared by vestibular PT   Increase LEFS >/= 9 points to increase function   4-6 weeks progress    Pt will improve 2 MWT by 48 feet to meet MDC. 4-6 weeks progress    Increase TUG </= 13.5 sec to decrease fall risk 4-6 weeks nt    Pt will be able to ascend 6 steps with SPC and one railing  forward with reciprocal pattern without noted difficulties. 4-6 weeks  MET    Pt will be able to safely ambulate household and limited community distances with SPC with modified I.    4-6 weeks progress In clinic with S   Pt with ability to transfer from a lower seat with use of one UE for assistance with minimal difficulties. 4-6 weeks progress    Pt will be independent with HEP to increase carryover at home 4-6 weeks MET                Christine Blair Mulvihill, PT

## 2024-08-02 ENCOUNTER — HOSPITAL ENCOUNTER (OUTPATIENT)
Dept: PHYSICAL THERAPY | Facility: REHABILITATION | Age: 89
Setting detail: THERAPIES SERIES
Discharge: HOME | End: 2024-08-02
Attending: SPECIALIST
Payer: MEDICARE

## 2024-08-02 ENCOUNTER — HOSPITAL ENCOUNTER (OUTPATIENT)
Dept: PHYSICAL THERAPY | Facility: REHABILITATION | Age: 89
Setting detail: THERAPIES SERIES
Discharge: HOME | End: 2024-08-02
Attending: INTERNAL MEDICINE
Payer: MEDICARE

## 2024-08-02 ENCOUNTER — HOSPITAL ENCOUNTER (OUTPATIENT)
Dept: OCCUPATIONAL THERAPY | Facility: REHABILITATION | Age: 89
Setting detail: THERAPIES SERIES
Discharge: HOME | End: 2024-08-02
Attending: INTERNAL MEDICINE
Payer: MEDICARE

## 2024-08-02 DIAGNOSIS — R42 VERTIGO: ICD-10-CM

## 2024-08-02 DIAGNOSIS — Z78.9 DECREASED ACTIVITIES OF DAILY LIVING (ADL): Primary | ICD-10-CM

## 2024-08-02 DIAGNOSIS — Z78.9 IMPAIRED INSTRUMENTAL ACTIVITIES OF DAILY LIVING (IADL): ICD-10-CM

## 2024-08-02 DIAGNOSIS — R26.2 DIFFICULTY IN WALKING: ICD-10-CM

## 2024-08-02 DIAGNOSIS — H81.10 BENIGN PAROXYSMAL POSITIONAL VERTIGO, UNSPECIFIED LATERALITY: Primary | ICD-10-CM

## 2024-08-02 DIAGNOSIS — S72.111A DISPLACED FRACTURE OF GREATER TROCHANTER OF RIGHT FEMUR, INITIAL ENCOUNTER FOR CLOSED FRACTURE (CMS/HCC): Primary | ICD-10-CM

## 2024-08-02 PROCEDURE — 97530 THERAPEUTIC ACTIVITIES: CPT | Mod: GP,CQ

## 2024-08-02 PROCEDURE — 97530 THERAPEUTIC ACTIVITIES: CPT | Mod: GO

## 2024-08-02 PROCEDURE — 97110 THERAPEUTIC EXERCISES: CPT | Mod: GP,CQ

## 2024-08-02 PROCEDURE — 97162 PT EVAL MOD COMPLEX 30 MIN: CPT | Mod: GP

## 2024-08-02 PROCEDURE — 97530 THERAPEUTIC ACTIVITIES: CPT | Mod: GP

## 2024-08-02 PROCEDURE — 97535 SELF CARE MNGMENT TRAINING: CPT | Mod: GO

## 2024-08-02 RX ORDER — FLUTICASONE PROPIONATE 50 MCG
1 SPRAY, SUSPENSION (ML) NASAL DAILY
COMMUNITY

## 2024-08-02 NOTE — OP PT TREATMENT LOG
ORTHO PT FLOWSHEET    Y/N Exercises Current Session Time    MODALITIES- HEAT/ICE  CPT 96573 TOTAL TIME FOR SESSION Not performed    Heat     Ice/Vasocompression     Mechanical Traction     THER ACT  CPT 21795 TOTAL TIME FOR SESSION 10 minutes    Pt Education Pt educated re: POC, objective findings, importance of attendance and performance of HEP.  7/29/24 pt and pts daughter educated re:  PN results and plan for continued PT.  If vestibular PT indicated and cause of balance deficits and gait dysfunction will discuss need for ortho and vestibular PT simultaneously.    Transfer training      y Pain, falls, meds and vitals Monitored pre rx, and post visit   7/25/24 - performed numerous vitals throughout treatment due to lower than normal reading upon arrival.  Also performed orthostatic readings as per flow sheet    THER EX  CPT 45877 TOTAL TIME FOR SESSION  45 minutes    CARDIOVASCULAR      HEP INSTRUCTION Instructed in primary HEP consisting of bridtes, bkfo, quad sets, glut sets.  Given exercise pro sheet and emailed to pts daughter.  See copy  below flow sheet.     y NUSTEP seat #11 arms #12 Level 1 x 10 minutes [facility]    [employer] Bike     Elliptical     Treadmill     STRENGTHENING      n  n  y  y  y  y  y  y  y Supine ther-ex  Quad sets  Glut sets  Ball squeezes  Clamshells  SKFO   [facility] (TTR)  Bridges  SAQ  SLR     1 x 10 5 sec  1 x 10 5 sec  2 x 10 3 sec  3 x 10 3 sec Pink band  3 x 10 3 sec Pink band  3 x 10 3 sec Pink band  3 x 10 3 sec Pink band  2 x 10 3 sec 1# B    2 x 10 1# B      held Seated ther-ex   Sit to stand   2 x 10     n  n  n  n  n Standing ther-ex  HR/TR  Hip flex  Hip ext  Hip abd  Mini squats   2 x 10  2 x 10  2#  2 x 10  2#  2 x 10  2#      STRETCHING      y  n  y LE stretching  HS strap stretch  SAUMYA  Frog stretch   3 x 30 seconds  Held SAUMYA due to reports of pain with self stretch  3 x 30 sec    Other stretching     REPEATED MOVEMENTS     NEURO RE-ED  CPT 94458 TOTAL TIME FOR  SESSION Not performed    COORDINATION     POSTURAL RE-ED        PRE-GAIT ACTIVITIES      BALANCE TRAINING      Sitting balance        n  n Standing balance  FOAM marching  SLS  Modified tandem stance   20 x  3 x 30 sec B  3 x 30 sec B    GAIT TRAINING  CPT 92868 TOTAL TIME FOR SESSION Not performed     n  nv Ambulation   Even terrain with SPC and no AD Uneven terrain w/SPC   Walked from ortho gym to neuro center stairs and back CS    Dynamic gait     n Stair negotiation 1 flight of steps with B railings and cgx1    Curb negotiation     Ramp negotiation     Outdoor ambulation     BWS/vector training     MANUAL  CPT 99588 TOTAL TIME FOR SESSION Not performed    Stretching 7/25/24 - Attempted gentle HS and gentle SAUMYA stretch, held due to pain    Mobilization      Massage     Taping      7/9/24

## 2024-08-02 NOTE — PROGRESS NOTES
Physical Therapy Visit    PT DAILY NOTE FOR OUTPATIENT THERAPY    Patient: Jose Fuentes MRN: [id number]  : 1932 89 y.o.  Referring Physician: Sj Chan MD  Date of Visit: 2024    Certification Dates: 24 through 10/01/24    Diagnosis:   1. Displaced fracture of greater trochanter of right femur, initial encounter for closed fracture (CMS/[facility])    2. Difficulty in walking        Chief Complaints:  decreased strength, decreased ROM, deviated gait, decreased balance    Precautions:   Existing Precautions/Restrictions: cardiac  Precautions comments: CAD, tachycardia      TODAY'S VISIT    Time In Session:  Start Time: 1405  Stop Time: 1500  Time Calculation (min): 55 min   History/Vitals/Pain/Encounter Info - 24 1357          Injury History/Precautions/Daily Required Info    Document Type daily treatment     Primary Therapist Christine Mulvihill, PT     Chief Complaint/Reason for Visit  decreased strength, decreased ROM, deviated gait, decreased balance     Onset of Illness/Injury or Date of Surgery 24     Referring Physician Sj Chan MD     Existing Precautions/Restrictions cardiac     Precautions comments CAD, tachycardia     History of present illness/functional impairment Jose presents s/p right hip fracture 24 and presents with chief complaints of functional strength deficits, gait deviations with dependence on a SPC and rollator, low grade pain in his left LE.  Objectively his testing was limited as his fracture was sustained only 7.5 weeks ago.  Objective measurements revealed decreased AROM and PROM of right LE, decreased quad tone B,        Jose is 15% functioing per PSFS and 33% per LEFS.  At this time he will benefit from skilled PT intervention to address these issues and progress towards attainment of goals as set at IE.     Patient/Family/Caregiver Comments/Observations Jose had his vestibular evaluation today which was inconclusive as she could  not reproduce his symptoms. He will have another session to reassess his vestibular system.  He has no pain and is improving with regard to his hip. His daughter Grant was asking if he should be doing weeding while sitting on his rollator. Recommended he not do that at this time.     Patient reported fall since last visit No        Pain Assessment    Currently in pain No/Denies        Pain Intervention    Intervention  exercise     Post Intervention Comments no pain.        Pre Activity Vital Signs    /56     BP Location Left upper arm     BP Method Manual     Patient Position Sitting                    Daily Treatment Assessment and Plan - 08/02/24 1511          Daily Treatment Assessment and Plan    Progress toward goals Progressing     Daily Outcome Summary Michael arrived with no pain, falls or med changes. Performed stretching and strengthening  as on treatement logs. His BP was 124/56 with no symptoms.     Plan and Recommendations Continue with POC per primary PT.                             Today's Treatment:        ORTHO PT FLOWSHEET    Y/N Exercises Current Session Time    MODALITIES- HEAT/ICE  CPT 30692 TOTAL TIME FOR SESSION Not performed    Heat     Ice/Vasocompression     Mechanical Traction     THER ACT  CPT 94117 TOTAL TIME FOR SESSION 10 minutes    Pt Education Pt educated re: POC, objective findings, importance of attendance and performance of HEP.  7/29/24 pt and pts daughter educated re:  PN results and plan for continued PT.  If vestibular PT indicated and cause of balance deficits and gait dysfunction will discuss need for ortho and vestibular PT simultaneously.    Transfer training      y Pain, falls, meds and vitals Monitored pre rx, and post visit   7/25/24 - performed numerous vitals throughout treatment due to lower than normal reading upon arrival.  Also performed orthostatic readings as per flow sheet    THER EX  CPT 63074 TOTAL TIME FOR SESSION  45 minutes    CARDIOVASCULAR      HEP  INSTRUCTION Instructed in primary HEP consisting of bridtes, bkfo, quad sets, glut sets.  Given exercise pro sheet and emailed to pts daughter.  See copy  below flow sheet.     y NUSTEP seat #11 arms #12 Level 1 x 10 minutes [facility]    Stationary Bike     Elliptical     Treadmill     STRENGTHENING      n  n  y  y  y  y  y  y  y Supine ther-ex  Quad sets  Glut sets  Ball squeezes  Clamshells  SKFO   Marches  Bridges  SAQ  SLR     1 x 10 5 sec  1 x 10 5 sec  2 x 10 3 sec  3 x 10 3 sec Pink band  3 x 10 3 sec Pink band  3 x 10 3 sec Pink band  3 x 10 3 sec Pink band  2 x 10 3 sec 1# B    2 x 10 1# B      held Seated ther-ex   Sit to stand   2 x 10     n  n  n  n  n Standing ther-ex  HR/TR  Hip flex  Hip ext  Hip abd  Mini squats   2 x 10  2 x 10  2#  2 x 10  2#  2 x 10  2#      STRETCHING      y  n  y LE stretching  HS strap stretch  SAUMYA  Frog stretch   3 x 30 seconds  Held SAUMYA due to reports of pain with self stretch  3 x 30 sec    Other stretching     REPEATED MOVEMENTS     NEURO RE-ED  CPT 90632 TOTAL TIME FOR SESSION Not performed    COORDINATION     POSTURAL RE-ED        PRE-GAIT ACTIVITIES      BALANCE TRAINING      Sitting balance        n  n Standing balance  FOAM marching  SLS  Modified tandem stance   20 x  3 x 30 sec B  3 x 30 sec B    GAIT TRAINING  CPT 40981 TOTAL TIME FOR SESSION Not performed     n  nv Ambulation   Even terrain with SPC and no AD Uneven terrain w/SPC   Walked from ortho gym to neuro center stairs and back CS    Dynamic gait     n Stair negotiation 1 flight of steps with B railings and cgx1    Curb negotiation     Ramp negotiation     Outdoor ambulation     BWS/vector training     MANUAL  CPT 44577 TOTAL TIME FOR SESSION Not performed    Stretching 7/25/24 - Attempted gentle HS and gentle SAUMYA stretch, held due to pain    Mobilization      Massage     Taping      7/9/24

## 2024-08-02 NOTE — LETTER
Dear DR. Stroud,    Thank you for this referral. Please review the attached notes and plan of care for your approval.  Please contact our department with any questions.     Sincerely,     Sonal Sheldon, PT  [address]  [place] [address]  Phone [phone]  Fax  [phone]    By co-signing this Plan of Care (POC) you agree to the following:  I have reviewed the the Plan of Care established by the therapist within this document and certify that the services are skilled and medically necessary. I have reviewed the plan and recommend that these services continue to meet the goals stated in this document.    PHYSICIAN SIGNATURE: __________________________________     DATE: ___________________  TIME: _____________           Physical Therapy Plan of Care 24   Effective from: 2024  Effective to: 2024    Plan ID: [id number]            Participants as of Finalize on 2024    Name Type Comments Contact Info    Sj Chan MD Surgeon  [phone]    Sonal Sheldon, PT Physical Therapist         Last Progress Notes Note     Author: Sonal Sheldon, PT Status: Addendum Last edited: 2024 12:00 PM       Physical Therapy Evaluation    [facility] Therapy Fax: [phone]    PT EVALUATION FOR OUTPATIENT THERAPY    Patient: Jose Fuentes    MRN: [id number]  : 1932 89 y.o.     Referring Physician: Grace Stroud MD  Date of Visit: 2024      Certification Dates:  24 through 24  reduce to 1x/week as clinically appropriate      Recommended Frequency & Duration:  2 times/week for up to 4 weeks     Diagnosis:   1. Benign paroxysmal positional vertigo, unspecified laterality    2. Vertigo        Chief Complaints:   Chief Complaint   Patient presents with   • Vertigo       Precautions: cardiac  Precautions additional comments: CAD, tachycardia    Past Medical History:   Past Medical History:   Diagnosis Date   • A-fib (CMS/[facility])    • CAD (coronary  artery disease)    • Hip fracture (CMS/HCC)     right   • Hx of ventricular tachycardia    • Prostate cancer (CMS/HCC)        Past Surgical History:   Past Surgical History:   Procedure Laterality Date   • CATARACT EXTRACTION Bilateral    • LAMINECTOMY      L3-L4         LEARNING ASSESSMENT    Assessment completed:  Yes    Learner name:  Jose Fuentes    Learner: Patient    Learning Barriers:  Learning barriers: Hearing    Preferred Language: English     Needed: No    Education Provided:   Method: Discussion  Readiness: acceptance and eager  Response: Demonstrated understanding and Verbalizes understanding      CO-LEARNER ASSESSMENT:    Completed: Yes:   Co-Learner name:  Grant Fuentes    Learner: Family    Learning Barriers:  Learning barriers: No Barriers    Preferred Language: English     Needed: No    Education Provided:   Method:  Discussion  Readiness:   acceptance and eager  Response:   Demonstrated understanding and Verbalizes understanding  Comments:       Welcome letter discussed: Yes Patient provided with Welcome Letter, which includes attendance policy. Provided education regarding cancellation and no-show policy. Education regarding the importance of participation and regular attendance to maximize goal attainment.       OBJECTIVE MEASUREMENTS/DATA:    Time In Session:  Start Time: 1205  Stop Time: 1305  Time Calculation (min): 60 min   Assessment and Plan - 08/02/24 1506          Assessment    Plan of Care reviewed and patient/family in agreement Yes     System Pathology/Pathophysiology Noted vestibular     Functional Limitations in Following Categories (PT Eval) self-care     Rehab Potential/Prognosis good, to achieve stated therapy goals     Problem List dizziness     Clinical Assessment Jose Fuentes is a 89 year old male who reports approximately 2 weeks ago he woke up during the middle of the night and the bed was spinning. He reports he has not had vertigo in over a  week but has had intermittent dizziness lying down or rolling to either side. The vestibular-ocular exam was unremarkable for smooth pursuit, saccades, and VOR. A LBN is present in L gaze with visual fixation removed suggesting R ear peripheral involvement. The HIT was positive to the R and the head shake test was (+) with a LBN consistent with R ear peripheral involvement. Positional testing for BPPV (-). It was repeated in the loaded position in consideration of possible increased particle-wall interaction and to elicit a stronger response. Motion sensitivity did not show a motion sensitivity. The patient's clinical presentation is evolving secondary to fluctuating symptoms with head movement and positional changes. The patient presents at a moderate level of complexity as supported by the past medical history and clinical presentation. The patient will benefit from an additional visit of vestibular PT to reassess for BPPV. If testing for BPPV (-) and symptoms are not produced in session, patient will be discharged and referred back to referring physician for additonal work-up.     Plan and Recommendations Reassess for BPPV and motion sensitivity. If testing for BPPV (-) and symptoms are not produced in session, patient will be discharged and referred back to referring physician for additonal work-up     Planned Services CPT 28159 Canalith repositioning procedure/maneuvers;CPT 68619 Neuromuscular Reeducation;CPT 61939 Therapeutic activities;CPT 66051 Therapeutic exercises;CPT 18220 Hot/Cold Packs therapy                    General Information - 08/02/24 1209          Session Details    Document Type initial evaluation     Mode of Treatment individual therapy        General Information    Onset of Illness/Injury or Date of Surgery 07/19/24     Referring Physician Grace Stroud MD     History of present illness/functional impairment Jose Fuentes is a 89 year old male who reports approximately 2 weeks ago he woke  "up during the middle of the night and the bed was spinning. He reports his daughter came and \"recalibrated his crystals\" by putting his head back and the vertigo resolved. The patient reports he had vertigo again the next evening with less intensity and resolved without treatment. He reports he had dizziness during the night 2 days ago. He reports he has dizziness when he lies on his back or rolls to either side however it occurs inconsistently. The patient denies a history of vertigo. He is currently sleeping with his head elevated. He sustained a R hip fracture on 5/13/24 from a fall and is currently receiving ortho treatment in PT at [facility]. He is using a rollator walker.     Limitations/Impairments hearing   wears hearing aides    Patient/Family/Caregiver Comments/Observations The patient was received in the waiting room with his daughter Grant. She was present for the entire evaluation. The patient ambulates with the rolling walker.     Existing Precautions/Restrictions cardiac     Precautions comments CAD, tachycardia                    Pain/Vitals - 08/02/24 1209          Pain Assessment    Currently in pain No/Denies        Pre Activity Vital Signs    Pulse 78     /44     BP Location Left upper arm     BP Method Manual     Patient Position Sitting        Post Activity Vital Signs    Post Activity /64     Post Activity BP Location Left upper arm     Post Activity BP Method Manual     Patient Position Sitting                    Falls/Food Screening - 08/02/24 1209          Initial Falls Assessment    One or more falls in the last year Yes     How many times 1     Was the patient injured in any fall Yes                    PT - 08/02/24 8915          Physical Therapy    Physical Therapy Specialty Vestibular Rehab        PT Plan    Frequency of treatment 2 times/week     PT Duration 4 weeks     PT Custom Frequency and Duration reduce to 1x/week as clinically appropriate     PT Cert From 08/02/24     PT " Cert To 09/01/24     Date PT POC was sent to provider 08/02/24     Signed PT Plan of Care received?  No                       Living Environment    Living Environment - 08/02/24 1209          Living Environment    People in Home child(jimena), adult     Living Arrangements house     Living Environment Comment living with daughter Grant at this time. has a reclining bed. 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        Vestibular Symptoms    Symptoms Vertigo        Vestibular/Ocular    Corrective lenses Reading     Eye ROM WNL     Smooth Pursuit/Small Target WNL     Saccades WNL     Vestibular Ocular Reflex (VOR) WNL     Spontaneous Evoked Nystagmus WNL     Gaze-Evoked Nystagmus WNL     Head Thrust Test Abnormal     Comments (+) R        Frenzel's Exam    Spontaneous Nystagmus WNL     Gaze Evoked Nystagmus Abnormal     Comments LBN in L gaze     Head Shaking Test Abnormal     Comments LBN     Convergence Spasm WNL        BPPV    Right [place] Hallpike  WNL;Modified with tilt table     Right [place] Hallpike Comment repeated w/ loaded [place]-Hallpike - WNL     Left [place] Hallpike  WNL;Modified with tilt table     Left [place] Hallpike Comments repeated w/ loaded [place]-Hallpike - WNL     Sit to Supine WNL     Horizontal Canal/Roll Test WNL     Interpretation of Results (-) BPPV        Motion Sensitivity    MSQ Percentage 0 percent     MSQ number of provoking positions 0        Other Outcome Measures    DHI 10                       Goals          Patient Stated    •  Vestibular Patient Stated (pt-stated)       To resolve vertigo/dizziness.         Other    •  Vestibular PT STG/LTG       Short Term Goals Time Frame Result Comment/Progress   Positional testing for BPPV to be reassessed and treatment to be initiated if positive for BPPV. 1-2 weeks     Patient will report less episodes of dizziness lying down and rolling. 1-2 weeks          Long Term Goals Time Frame Result Comment/Progress   Patient will be negative for BPPV in all canals to reduce symptoms with functional mobility.  4 weeks     Patient will improve score on DHI to 4/100 for decreased report of symptoms with daily activities. 4 weeks      The patient will report no vertigo with daily activities. 4 weeks                      TREATMENT PLAN:    VESTIBULAR PT FLOWSHEET    P.T. TREATMENT LOG   Precautions: cardiac, falls, R hip fracture, [place]   Eval Date: 8/2/24 Progress Note:   POC expires: 9/1/24 Insurance Limits:   Treatment Current Session Time   CANALITH  REPOSITIONING TREATMENT  (CPT 48716) Y/N Notes Not performed   CRT      NEURO RE-ED  (CPT 54533) Y/N Notes Not performed   BPPV ASSESSMENT     VOR CANCELLATION                      Standing H/VVOR-C     Ambulation w/ H/VVOR-C     VOR / GAZE STABILITY     Standing H/VVOR     Ambulation w/H/VVOR     H/VVOR on compliant surfaces     Functional VOR     HABITUATION     Ball circles     Repetitive functional movements     BALANCE- STATIC     On floor     Airex foam     Rockerboard     BALANCE- DYNAMIC     Ambulation-head turns/nods     Ambulation - 180 & 360 degree turns     Retro ambulation EO     Ambulation with EC     Obstacles     Tandem     *Objective Measures   MSQ     HOT / COLD PACKS  (CPT 50986) Y/N  Not performed   Heat     Ice     THER ACT  (CPT 88805) Y/N  8-22 Minutes   Review pain, meds, falls, vitals, symptoms     *Subjective Measures   I     Patient Education/HEP y The findings of the initial  evaluation and the POC were discussed with the patient and his daughter. They verbalized good understanding and are in agreement with the POC to be seen for 1 more visit. If no symptoms produced, he will not be followed in vestibular therapy.             Date: _8/2/24  __Baseline Level (0-5):_0/5___ MSQ:_0%      Position Change Intensity Adjusted Intensity Duration Score   1.Sitting to Supine    0   2.Supine to Left Side Lying    0   3.Supine to Right Side Lying    0   4.Supine to Sitting    0   5.Left Hallpike    0   6.Return to Sitting    0   7.Right Hallpike    0   8.Return to Sitting    0   9.Sitting to Nose to Left Knee    0   10.Return to Sitting    0   11.Sitting to Nose to Right Knee    0   12.Return to Sitting    0   13.Sitting with Head Rotation (Side-Side 5x)    0   14.Sitting with Head Flex & Ext (Nod 5x)    0   15.Standing with Turn 180* to Right    N/T   16.Standing with Turn 180* to Left    N/T       Total   Score = 0     Intensity: Scale from 0 to 5 (0 = No sx; 5 = Severe sx per pt's subjective report)  Adjusted Intensity: Intensity Level - Baseline Level  Duration: Scale from 0 to 3 for return to baseline (5-10 sec = 1, 11-30 sec = 2, >30 sec = 3)  Score: Adjusted Intensity + Duration    Motion Sensitivity Quotient: # Provoking Positions x Total Score x100 =  ________________                                                                               2048                                 *If <16 positions are tested, 2048 is replaced with 8 x (# of positions tested)2   (ex: Tested 12 positions, so 8 x (12)2 = 1152 and new MSQ is (# Provoking Positions x Total Score)/1152    Nima FW, Bob MJ. Validity and reliability of the Motion Sensitivity Test.    Journal of Rehabilitation Research and Development. 2003;40(5):415-422.       ASSESSMENT:    This 89 y.o. year old male presents to PT with above stated diagnosis. Physical Therapy evaluation reveals dizziness resulting in self-care limitations.  Jose Fuentes will benefit from skilled PT services to address limitation, work towards rehab and patient goals and maximize PLOF of chosen ADLs.     Planned Services: The patient's treatment will include CPT 01608 Canalith repositioning procedure/maneuvers, CPT 49553 Neuromuscular Reeducation, CPT 17947 Therapeutic activities, CPT 73633 Therapeutic exercises, CPT 39552 Hot/Cold Packs therapy, .     Sonal Sheldon, PT                           Current Participants as of 8/2/2024    Name Type Comments Contact Info    Sj Chan MD Surgeon  [phone]    Signature pending    Sonal Sheldon, PT Physical Therapist      Electronically signed by Sonal Sheldon PT at 8/2/2024 1611 EDT

## 2024-08-02 NOTE — PROGRESS NOTES
Physical Therapy Evaluation    Neuro Rehab Therapy Fax: [phone]    PT EVALUATION FOR OUTPATIENT THERAPY    Patient: Jose Fuentes    MRN: [id number]  : 1932 89 y.o.     Referring Physician: Grace Stroud MD  Date of Visit: 2024      Certification Dates:  24 through 24  reduce to 1x/week as clinically appropriate      Recommended Frequency & Duration:  2 times/week for up to 4 weeks     Diagnosis:   1. Benign paroxysmal positional vertigo, unspecified laterality    2. Vertigo        Chief Complaints:   Chief Complaint   Patient presents with    Vertigo       Precautions: cardiac  Precautions additional comments: CAD, tachycardia    Past Medical History:   Past Medical History:   Diagnosis Date    A-fib (CMS/Formerly [facility])     CAD (coronary artery disease)     Hip fracture (CMS/Formerly [facility])     right    Hx of ventricular tachycardia     Prostate cancer (CMS/Formerly [facility])        Past Surgical History:   Past Surgical History:   Procedure Laterality Date    CATARACT EXTRACTION Bilateral     LAMINECTOMY      L3-L4         LEARNING ASSESSMENT    Assessment completed:  Yes    Learner name:  Jose Fuentes    Learner: Patient    Learning Barriers:  Learning barriers: Hearing    Preferred Language: English     Needed: No    Education Provided:   Method: Discussion  Readiness: acceptance and eager  Response: Demonstrated understanding and Verbalizes understanding      CO-LEARNER ASSESSMENT:    Completed: Yes:   Co-Learner name:  Grant Fuentes    Learner: Family    Learning Barriers:  Learning barriers: No Barriers    Preferred Language: English     Needed: No    Education Provided:   Method:  Discussion  Readiness:   acceptance and eager  Response:   Demonstrated understanding and Verbalizes understanding  Comments:       Welcome letter discussed: Yes Patient provided with Welcome Letter, which includes attendance policy. Provided education regarding cancellation and no-show policy.  Education regarding the importance of participation and regular attendance to maximize goal attainment.       OBJECTIVE MEASUREMENTS/DATA:    Time In Session:  Start Time: 1205  Stop Time: 1305  Time Calculation (min): 60 min   Assessment and Plan - 08/02/24 1506          Assessment    Plan of Care reviewed and patient/family in agreement Yes     System Pathology/Pathophysiology Noted vestibular     Functional Limitations in Following Categories (PT Eval) self-care     Rehab Potential/Prognosis good, to achieve stated therapy goals     Problem List dizziness     Clinical Assessment Jose Fuentes is a 89 year old male who reports approximately 2 weeks ago he woke up during the middle of the night and the bed was spinning. He reports he has not had vertigo in over a week but has had intermittent dizziness lying down or rolling to either side. The vestibular-ocular exam was unremarkable for smooth pursuit, saccades, and VOR. A LBN is present in L gaze with visual fixation removed suggesting R ear peripheral involvement. The HIT was positive to the R and the head shake test was (+) with a LBN consistent with R ear peripheral involvement. Positional testing for BPPV (-). It was repeated in the loaded position in consideration of possible increased particle-wall interaction and to elicit a stronger response. Motion sensitivity did not show a motion sensitivity. The patient's clinical presentation is evolving secondary to fluctuating symptoms with head movement and positional changes. The patient presents at a moderate level of complexity as supported by the past medical history and clinical presentation. The patient will benefit from an additional visit of vestibular PT to reassess for BPPV. If testing for BPPV (-) and symptoms are not produced in session, patient will be discharged and referred back to referring physician for additonal work-up.     Plan and Recommendations Reassess for BPPV and motion sensitivity. If  "testing for BPPV (-) and symptoms are not produced in session, patient will be discharged and referred back to referring physician for additonal work-up     Planned Services CPT 17653 Canalith repositioning procedure/maneuvers;CPT 58195 Neuromuscular Reeducation;CPT 26408 Therapeutic activities;CPT 27824 Therapeutic exercises;CPT 30275 Hot/Cold Packs therapy                    General Information - 08/02/24 1209          Session Details    Document Type initial evaluation     Mode of Treatment individual therapy        General Information    Onset of Illness/Injury or Date of Surgery 07/19/24     Referring Physician Grace Stroud MD     History of present illness/functional impairment Jose Fuentes is a 89 year old male who reports approximately 2 weeks ago he woke up during the middle of the night and the bed was spinning. He reports his daughter came and \"recalibrated his crystals\" by putting his head back and the vertigo resolved. The patient reports he had vertigo again the next evening with less intensity and resolved without treatment. He reports he had dizziness during the night 2 days ago. He reports he has dizziness when he lies on his back or rolls to either side however it occurs inconsistently. The patient denies a history of vertigo. He is currently sleeping with his head elevated. He sustained a R hip fracture on 5/13/24 from a fall and is currently receiving ortho treatment in PT [facility]. He is using a rollator walker.     Limitations/Impairments hearing   wears hearing aides    Patient/Family/Caregiver Comments/Observations The patient was received in the waiting room with his daughter Grant. She was present for the entire evaluation. The patient ambulates with the rolling walker.     Existing Precautions/Restrictions cardiac     Precautions comments CAD, tachycardia                    Pain/Vitals - 08/02/24 1209          Pain Assessment    Currently in pain No/Denies        Pre Activity Vital " Signs    Pulse 78     /44     BP Location Left upper arm     BP Method Manual     Patient Position Sitting        Post Activity Vital Signs    Post Activity /64     Post Activity BP Location Left upper arm     Post Activity BP Method Manual     Patient Position Sitting                    Falls/Food Screening - 08/02/24 1209          Initial Falls Assessment    One or more falls in the last year Yes     How many times 1     Was the patient injured in any fall Yes                    PT - 08/02/24 1506          Physical Therapy    Physical Therapy Specialty Vestibular Rehab        PT Plan    Frequency of treatment 2 times/week     PT Duration 4 weeks     PT Custom Frequency and Duration reduce to 1x/week as clinically appropriate     PT Cert From 08/02/24     PT Cert To 09/01/24     Date PT POC was sent to provider 08/02/24     Signed PT Plan of Care received?  No                       Living Environment    Living Environment - 08/02/24 1209          Living Environment    People in Home child(jimena), adult     Living Arrangements house     Living Environment Comment living with daughter Grant at this time. has a reclining bed. 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        Vestibular Symptoms    Symptoms Vertigo        Vestibular/Ocular    Corrective lenses Reading     Eye ROM WNL     Smooth Pursuit/Small Target WNL     Saccades WNL     Vestibular Ocular Reflex (VOR) WNL      Spontaneous Evoked Nystagmus WNL     Gaze-Evoked Nystagmus WNL     Head Thrust Test Abnormal     Comments (+) R        Frenzel's Exam    Spontaneous Nystagmus WNL     Gaze Evoked Nystagmus Abnormal     Comments LBN in L gaze     Head Shaking Test Abnormal     Comments LBN     Convergence Spasm WNL        BPPV    Right Ochelata Hallpike  WNL;Modified with tilt table     Right Ochelata Hallpike Comment repeated w/ loaded Hollis-Hallpike - WNL     Left Hollis Hallpike  WNL;Modified with tilt table     Left Ochelata Hallpike Comments repeated w/ loaded Hollis-Hallpike - WNL     Sit to Supine WNL     Horizontal Canal/Roll Test WNL     Interpretation of Results (-) BPPV        Motion Sensitivity    MSQ Percentage 0 percent     MSQ number of provoking positions 0        Other Outcome Measures    DHI 10                      Goals          Patient Stated      Vestibular Patient Stated (pt-stated)       To resolve vertigo/dizziness.         Other      Vestibular PT STG/LTG       Short Term Goals Time Frame Result Comment/Progress   Positional testing for BPPV to be reassessed and treatment to be initiated if positive for BPPV. 1-2 weeks     Patient will report less episodes of dizziness lying down and rolling. 1-2 weeks          Long Term Goals Time Frame Result Comment/Progress   Patient will be negative for BPPV in all canals to reduce symptoms with functional mobility.  4 weeks     Patient will improve score on DHI to 4/100 for decreased report of symptoms with daily activities. 4 weeks      The patient will report no vertigo with daily activities. 4 weeks                      TREATMENT PLAN:    VESTIBULAR PT FLOWSHEET    P.T. TREATMENT LOG   Precautions: cardiac, falls, R hip fracture, [place]   Eval Date: 8/2/24 Progress Note:   POC expires: 9/1/24 Insurance Limits:   Treatment Current Session Time   CANALITH  REPOSITIONING TREATMENT  (CPT 14321) Y/N Notes Not performed   CRT      NEURO RE-ED  (CPT 75402) Y/N Notes Not performed   BPPV ASSESSMENT      VOR CANCELLATION                      Standing H/VVOR-C     Ambulation w/ H/VVOR-C     VOR / GAZE STABILITY     Standing H/VVOR     Ambulation w/H/VVOR     H/VVOR on compliant surfaces     Functional VOR     HABITUATION     Ball circles     Repetitive functional movements     BALANCE- STATIC     On floor     Airex foam     Rockerboard     BALANCE- DYNAMIC     Ambulation-head turns/nods     Ambulation - 180 & 360 degree turns     Retro ambulation EO     Ambulation with EC     Obstacles     Tandem     *Objective Measures   MSQ     HOT / COLD PACKS  (CPT 02086) Y/N  Not performed   Heat     Ice     THER ACT  (CPT 84981) Y/N  8-22 Minutes   Review pain, meds, falls, vitals, symptoms     *Subjective Measures   DHI     Patient Education/HEP y The findings of the initial evaluation and the POC were discussed with the patient and his daughter. They verbalized good understanding and are in agreement with the POC to be seen for 1 more visit. If no symptoms produced, he will not be followed in vestibular therapy.             Date: _8/2/24  __Baseline Level (0-5):_0/5___ MSQ:_0%      Position Change Intensity Adjusted Intensity Duration Score   1.Sitting to Supine    0   2.Supine to Left Side Lying    0   3.Supine to Right Side Lying    0   4.Supine to Sitting    0   5.Left Hallpike    0   6.Return to Sitting    0   7.Right Hallpike    0   8.Return to Sitting    0   9.Sitting to Nose to Left Knee    0   10.Return to Sitting    0   11.Sitting to Nose to Right Knee    0   12.Return to Sitting    0   13.Sitting with Head Rotation (Side-Side 5x)    0   14.Sitting with Head Flex & Ext (Nod 5x)    0   15.Standing with Turn 180* to Right    N/T   16.Standing with Turn 180* to Left    N/T       Total   Score = 0     Intensity: Scale from 0 to 5 (0 = No sx; 5 = Severe sx per pt's subjective report)  Adjusted Intensity: Intensity Level - Baseline Level  Duration: Scale from 0 to 3 for return to baseline (5-10 sec = 1, 11-30 sec = 2,  >30 sec = 3)  Score: Adjusted Intensity + Duration    Motion Sensitivity Quotient: # Provoking Positions x Total Score x100 =  ________________                                                                               2048                                 *If <16 positions are tested, 2048 is replaced with 8 x (# of positions tested)2   (ex: Tested 12 positions, so 8 x (12)2 = 1152 and new MSQ is (# Provoking Positions x Total Score)/1152    Nima FW, Bob BAILEY. Validity and reliability of the Motion Sensitivity Test.    Journal of Rehabilitation Research and Development. 2003;40(5):415-422.       ASSESSMENT:    This 89 y.o. year old male presents to PT with above stated diagnosis. Physical Therapy evaluation reveals dizziness resulting in self-care limitations. Jose Fuentes will benefit from skilled PT services to address limitation, work towards rehab and patient goals and maximize PLOF of chosen ADLs.     Planned Services: The patient's treatment will include CPT 78471 Canalith repositioning procedure/maneuvers, CPT 29556 Neuromuscular Reeducation, CPT 89178 Therapeutic activities, CPT 99006 Therapeutic exercises, CPT 08056 Hot/Cold Packs therapy, .     Sonal Sheldon, PT

## 2024-08-02 NOTE — OP PT TREATMENT LOG
VESTIBULAR PT FLOWSHEET    P.T. TREATMENT LOG   Precautions: cardiac, falls, R hip fracture, [facility]   Eval Date: 8/2/24 Progress Note:   POC expires: 9/1/24 Insurance Limits:   Treatment Current Session Time   CANALITH  REPOSITIONING TREATMENT  (CPT 23545) Y/N Notes Not performed   CRT      NEURO RE-ED  (CPT 09057) Y/N Notes Not performed   BPPV ASSESSMENT     VOR CANCELLATION                      Standing H/VVOR-C     Ambulation w/ H/VVOR-C     VOR / GAZE STABILITY     Standing H/VVOR     Ambulation w/H/VVOR     H/VVOR on compliant surfaces     Functional VOR     HABITUATION     Ball circles     Repetitive functional movements     BALANCE- STATIC     On floor     Airex foam     Rockerboard     BALANCE- DYNAMIC     Ambulation-head turns/nods     Ambulation - 180 & 360 degree turns     Retro ambulation EO     Ambulation with EC     Obstacles     Tandem     *Objective Measures   MSQ     HOT / COLD PACKS  (CPT 56775) Y/N  Not performed   Heat     Ice     THER ACT  (CPT 09681) Y/N  8-22 Minutes   Review pain, meds, falls, vitals, symptoms     *Subjective Measures   DHI     Patient Education/HEP y The findings of the initial evaluation and the POC were discussed with the patient and his daughter. They verbalized good understanding and are in agreement with the POC to be seen for 1 more visit. If no symptoms produced, he will not be followed in vestibular therapy.             Date: _8/2/24  __Baseline Level (0-5):_0/5___ MSQ:_0%      Position Change Intensity Adjusted Intensity Duration Score   1.Sitting to Supine    0   2.Supine to Left Side Lying    0   3.Supine to Right Side Lying    0   4.Supine to Sitting    0   5.Left Hallpike    0   6.Return to Sitting    0   7.Right Hallpike    0   8.Return to Sitting    0   9.Sitting to Nose to Left Knee    0   10.Return to Sitting    0   11.Sitting to Nose to Right Knee    0   12.Return to Sitting    0   13.Sitting with Head Rotation (Side-Side 5x)    0   14.Sitting with Head Flex  & Ext (Nod 5x)    0   15.Standing with Turn 180* to Right    N/T   16.Standing with Turn 180* to Left    N/T       Total   Score = 0     Intensity: Scale from 0 to 5 (0 = No sx; 5 = Severe sx per pt's subjective report)  Adjusted Intensity: Intensity Level - Baseline Level  Duration: Scale from 0 to 3 for return to baseline (5-10 sec = 1, 11-30 sec = 2, >30 sec = 3)  Score: Adjusted Intensity + Duration    Motion Sensitivity Quotient: # Provoking Positions x Total Score x100 =  ________________                                                                               2048                                 *If <16 positions are tested, 2048 is replaced with 8 x (# of positions tested)2   (ex: Tested 12 positions, so 8 x (12)2 = 1152 and new MSQ is (# Provoking Positions x Total Score)/1152    Nima CORTEZ, Bob BAILEY. Validity and reliability of the Motion Sensitivity Test.    Journal of Rehabilitation Research and Development. 2003;40(5):415-422.
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
## 2024-08-05 ENCOUNTER — HOSPITAL ENCOUNTER (OUTPATIENT)
Dept: PHYSICAL THERAPY | Facility: REHABILITATION | Age: 89
Setting detail: THERAPIES SERIES
Discharge: HOME | End: 2024-08-05
Attending: SPECIALIST
Payer: MEDICARE

## 2024-08-05 ENCOUNTER — HOSPITAL ENCOUNTER (OUTPATIENT)
Dept: PHYSICAL THERAPY | Facility: REHABILITATION | Age: 89
Setting detail: THERAPIES SERIES
Discharge: HOME | End: 2024-08-05
Attending: INTERNAL MEDICINE
Payer: MEDICARE

## 2024-08-05 ENCOUNTER — HOSPITAL ENCOUNTER (OUTPATIENT)
Dept: OCCUPATIONAL THERAPY | Facility: REHABILITATION | Age: 89
Setting detail: THERAPIES SERIES
Discharge: HOME | End: 2024-08-05
Attending: INTERNAL MEDICINE
Payer: MEDICARE

## 2024-08-05 DIAGNOSIS — R42 VERTIGO: ICD-10-CM

## 2024-08-05 DIAGNOSIS — Z78.9 IMPAIRED INSTRUMENTAL ACTIVITIES OF DAILY LIVING (IADL): ICD-10-CM

## 2024-08-05 DIAGNOSIS — S72.111A DISPLACED FRACTURE OF GREATER TROCHANTER OF RIGHT FEMUR, INITIAL ENCOUNTER FOR CLOSED FRACTURE (CMS/HCC): Primary | ICD-10-CM

## 2024-08-05 DIAGNOSIS — R26.2 DIFFICULTY IN WALKING: ICD-10-CM

## 2024-08-05 DIAGNOSIS — H81.10 BENIGN PAROXYSMAL POSITIONAL VERTIGO, UNSPECIFIED LATERALITY: Primary | ICD-10-CM

## 2024-08-05 DIAGNOSIS — Z78.9 DECREASED ACTIVITIES OF DAILY LIVING (ADL): Primary | ICD-10-CM

## 2024-08-05 PROCEDURE — 97112 NEUROMUSCULAR REEDUCATION: CPT | Mod: GO

## 2024-08-05 PROCEDURE — 97530 THERAPEUTIC ACTIVITIES: CPT | Mod: GO

## 2024-08-05 PROCEDURE — 97110 THERAPEUTIC EXERCISES: CPT | Mod: GP,KX

## 2024-08-05 PROCEDURE — 97530 THERAPEUTIC ACTIVITIES: CPT | Mod: GP

## 2024-08-05 PROCEDURE — 97530 THERAPEUTIC ACTIVITIES: CPT | Mod: GP,KX

## 2024-08-05 PROCEDURE — 97112 NEUROMUSCULAR REEDUCATION: CPT | Mod: GP,KX

## 2024-08-05 NOTE — OP PT TREATMENT LOG
VESTIBULAR PT FLOWSHEET    P.T. TREATMENT LOG   Precautions: cardiac, falls, R hip fracture, [place]   Eval Date: 8/2/24 Progress Note:   POC expires: 9/1/24 Insurance Limits:   Treatment Current Session Time   CANALITH  REPOSITIONING TREATMENT  (CPT 82079) Y/N Notes Not performed   CRT      NEURO RE-ED  (CPT 48863) Y/N Notes 8-22 Minutes   BPPV ASSESSMENT y See vestibular flowsheet   VOR CANCELLATION                      Standing H/VVOR-C     Ambulation w/ H/VVOR-C     VOR / GAZE STABILITY     Standing H/VVOR     Ambulation w/H/VVOR     H/VVOR on compliant surfaces     Functional VOR     HABITUATION     Ball circles     Repetitive functional movements     BALANCE- STATIC     On floor     Airex foam     Rockerboard     BALANCE- DYNAMIC     Ambulation-head turns/nods     Ambulation - 180 & 360 degree turns     Retro ambulation EO     Ambulation with EC     Obstacles     Tandem     *Objective Measures   MSQ     HOT / COLD PACKS  (CPT 76558) Y/N  Not performed   Heat     Ice     THER ACT  (CPT 62472) Y/N  8-22 Minutes   Review pain, meds, falls, vitals, symptoms y completed   *Subjective Measures   DHI     Patient Education/HEP y The findings of the positional testing and the POC were discussed with the patient and his daughter. PT explained nystagmus that is seen with BPPV and risk factors for BPPV. They verbalized good understanding and are in agreement with the POC to keep his chart open for 2 more weeks.  If no vertigo in the next 2 weeks, he will be discharged from vestibular PT.             Date: _8/2/24  __Baseline Level (0-5):_0/5___ MSQ:_0%      Position Change Intensity Adjusted Intensity Duration Score   1.Sitting to Supine    0   2.Supine to Left Side Lying    0   3.Supine to Right Side Lying    0   4.Supine to Sitting    0   5.Left Hallpike    0   6.Return to Sitting    0   7.Right Hallpike    0   8.Return to Sitting    0   9.Sitting to Nose to Left Knee    0   10.Return to Sitting    0   11.Sitting to Nose to  Right Knee    0   12.Return to Sitting    0   13.Sitting with Head Rotation (Side-Side 5x)    0   14.Sitting with Head Flex & Ext (Nod 5x)    0   15.Standing with Turn 180* to Right    N/T   16.Standing with Turn 180* to Left    N/T       Total   Score = 0     Intensity: Scale from 0 to 5 (0 = No sx; 5 = Severe sx per pt's subjective report)  Adjusted Intensity: Intensity Level - Baseline Level  Duration: Scale from 0 to 3 for return to baseline (5-10 sec = 1, 11-30 sec = 2, >30 sec = 3)  Score: Adjusted Intensity + Duration    Motion Sensitivity Quotient: # Provoking Positions x Total Score x100 =  ________________                                                                               2048                                 *If <16 positions are tested, 2048 is replaced with 8 x (# of positions tested)2   (ex: Tested 12 positions, so 8 x (12)2 = 1152 and new MSQ is (# Provoking Positions x Total Score)/1152    Nmia FW, Bob MJ. Validity and reliability of the Motion Sensitivity Test.    Journal of Rehabilitation Research and Development. 2003;40(5):415-422.      20

## 2024-08-05 NOTE — OP OT TREATMENT LOG
OT NEURO FLOW SHEET    EXERCISES CURRENT SESSION TIME   NEURO RE-ED  CPT 40997 TOTAL TIME FOR SESSION 30 minutes   AAROM/AROM     Strengthening UBE SCIFIT addressing pt's reed strength, generalized endurance, activity tolerance and reed integration into tasks. Pt performed the following. With dual tasking requirements with pt changing direction every 1 minute for a total of 7 minutes time on upgraded level 3 today.    Completed in stance today and maintained >45RPM throughout for 0.83 miles distance     Strength     BITs     Gross Motor Control Standing at edge of mat with participation in posterior and overhead weighted item pass (left <> right) with use of 3.3lb medicine ball  2x each direction, 10 reps in each set.     Task addressing pts static and dynamic standing tolerance and balance and ability to maintain midline orientation with functional weight shifting to improve safety with iADLs and reduce risk of falls/injury    Some limitation in right shoulder, difficulty with full overhead flexion, compensations noted. Pt declined use of lighted object when offered.    Fine Motor Control     Sitting Margareth/Balance     Standing Margareth/Balance Tennis Racquet rolls/bounces performed in stance at edge of mat addressing visual motor coordination, dynamic balance, functional weight shifting, stand tolerance and proprioception.   Pt performed perimeter rolls x15 repetitions in right and then left UE and then bounces (as able) in right and then left UE. Improving over time with both. No loss of balance elicited. Standing tolerance >15 minutes.      Retraining     THERE ACT  CPT 99055 TOTAL TIME FOR SESSION 8-22 Minutes  20 minutes   Pain, Vitals, Meds, Etc. Assessed and monitored. Pts daughter present for OT session today, discussed upcoming progress note anticipated for next visit, after which determination of ongoing skilled need to be discussed. At this time, likely for pt to continue throughout OT POC in place and d/c  "on/around 9/5/24. Pt and daughter in agreement. Daughter hands off pt's ongoing and increasing independence in basic and iADLs but also some unsafe behaviors (ie: weeding without assistance or supervision). Discussed use of rollator to perform tasks in sitting with good impact and demonstration. Lastly discussed ongoing pt goals to return to driving. Pt in general performs well on the driving simulator, however does have difficulty managing the \"virtual reality/simulation\" aspects. Since pt with valid license in place - discussed benefit of trial of driving with supervision in open area (unused school parking lot etc). To trial return to driving in safest way. Pt and daughter in agreement.    Pt also presents with novel goals to continue to address in OT sessions including: fall recovery, getting into/out of cross-legged position, use of Jacuzzi tub. Above also handed off to PT for carryover    Hx:  Pt incidentally shares program from [facility] in which available to borrow or receive permanently dependent on need. Reports he received his rollator (permanently) and wheelchair to borrow. Recommends this program to others, as well as [place] and [facility]    Hx:  Pt/daughter share one instance of vertigo/vestibular issues roughly 2 weeks ago over the course of 2-3 nights. No visual complaints reported by pt or family member. Following discussion with pt/daughter, at this time it is not recommended pt pursue an OP OT Neuro evaluation. Pt does not demonstrate nor report visual deficits to support additional therapy services. Pt is in agreement with continuing traditional OP OT to address functional goals. Pt will monitor vertigo symptoms and seek PT evaluation for further assessment should this issue continue. This therapist contacted primary PT for awareness of concern.     HEP Dicussed integration of above gross motor and standing balance activities into home HEP with safety precautions in place " (supervision vs standing therex in front of secure countertop). Pt and daughter eager to incorporate racquet and unilateral passing therex.    Functional Mobility CLS with rollator throughout hospital setting    Transfers     THERE EX  CPT 43871 TOTAL TIME FOR SESSION Not performed   PROM     Activity Tolerance     SELF-CARE  CPT 17149 TOTAL TIME FOR SESSION 5 minutes   Pt Education/Safety Discussed return to driving goals as pt has valid license at this time, recommended return to driving in open areas (parking lots etc) with supervision by pt's daughter to trial return to driving in a safe environment. Pt tolerates driving simulator well, however has difficulty navigating the virtual reality aspects of it at this time.    ADL Training     IADL Training     MODALITIES  CPT 27545 TOTAL TIME FOR SESSION Not performed   Ice/Heat     ATTENDED E-STIM  CPT 16911 TOTAL TIME FOR SESSION Not performed   Attended E-Stim     SPLINTING  CPT 23376 TOTAL TIME FOR SESSION Not performed   Fabrication/Check Out     Fit     Training     GROUP  CPT 27090 TOTAL TIME FOR SESSION Not performed

## 2024-08-05 NOTE — PROGRESS NOTES
Physical Therapy Visit    PT DAILY NOTE FOR OUTPATIENT THERAPY    Patient: Jose Fuentes MRN: [id number]  : 1932 89 y.o.  Referring Physician: Sj Chan MD  Date of Visit: 2024    Certification Dates: 24 through 10/01/24    Diagnosis:   1. Displaced fracture of greater trochanter of right femur, initial encounter for closed fracture (CMS/[facility])    2. Difficulty in walking        Chief Complaints:  decreased strength, decreased ROM, deviated gait, decreased balance    Precautions:   Existing Precautions/Restrictions: cardiac  Precautions comments: CAD, tachycardia      TODAY'S VISIT    Time In Session:  Start Time: 1102  Stop Time: 1200  Time Calculation (min): 58 min   History/Vitals/Pain/Encounter Info - 24 1132          Injury History/Precautions/Daily Required Info    Document Type daily treatment     Primary Therapist Christine Mulvihill, PT     Chief Complaint/Reason for Visit  decreased strength, decreased ROM, deviated gait, decreased balance     Onset of Illness/Injury or Date of Surgery 24     Referring Physician Sj Chna MD     Existing Precautions/Restrictions cardiac     Precautions comments CAD, tachycardia     History of present illness/functional impairment Jose presents s/p right hip fracture 24 and presents with chief complaints of functional strength deficits, gait deviations with dependence on a SPC and rollator, low grade pain in his left LE.  Objectively his testing was limited as his fracture was sustained only 7.5 weeks ago.  Objective measurements revealed decreased AROM and PROM of right LE, decreased quad tone B,        Jose is 15% functioing per PSFS and 33% per LEFS.  At this time he will benefit from skilled PT intervention to address these issues and progress towards attainment of goals as set at IE.     Patient/Family/Caregiver Comments/Observations Pt notes that he would like to learn to get off of floor. Daughter Grant  "noting pt a little off from blood sugar drop after OT but usually resolves with small snack.     Patient reported fall since last visit No        Pain Assessment    Currently in pain No/Denies     Preferred Pain Scale number (Numeric Rating Pain Scale)     Pain Rating (0-10): Pre Activity 0     Pain Rating (0-10): Post Activity 0        Pain Intervention    Intervention  monitored     Post Intervention Comments no increased sx with treatment.                    Daily Treatment Assessment and Plan - 08/05/24 1132          Daily Treatment Assessment and Plan    Progress toward goals Progressing     Daily Outcome Summary Limited session to low level exercises due to pts noted drop in blood sugar.  after orange, pt back to normal self.  appeared to understand new ex as issued to HEP.     Plan and Recommendations Continue with POC per primary PT.  Review ex as issued to HEP.  Review additions to HEP                         OBJECTIVE DATA TAKEN TODAY:    None taken    Today's Treatment:        ORTHO PT FLOWSHEET    Y/N Exercises Current Session Time    MODALITIES- HEAT/ICE  CPT 60017 TOTAL TIME FOR SESSION Not performed    Heat     Ice/Vasocompression     Mechanical Traction     THER ACT  CPT 35102 TOTAL TIME FOR SESSION 10 minutes    Pt Education Pt educated re: POC, objective findings, importance of attendance and performance of HEP.  7/29/24 pt and pts daughter educated re:  PN results and plan for continued PT.  If vestibular PT indicated and cause of balance deficits and gait dysfunction will discuss need for ortho and vestibular PT simultaneously.    Transfer training            y Pain, falls, meds and vitals Monitored pre rx, and post visit   7/25/24 - performed numerous vitals throughout treatment due to lower than normal reading upon arrival.  Also performed orthostatic readings as per flow sheet  8/5/24 - vitals, pt slightly \"off\" per daughter coming from OT, she noted usually his blood sugar.  Vitals wnls and " after eating tho, pt returned to normal with light easy exercises.    THER EX  CPT 60117 TOTAL TIME FOR SESSION  45 minutes    CARDIOVASCULAR      HEP INSTRUCTION Instructed in primary HEP consisting of bridtes, bkfo, quad sets, glut sets.  Given exercise pro sheet and emailed to pts daughter.  See copy  below flow sheet  8/5/24 - added standing hip flex/ext/abd, heel raises,  to HEP see exercise pro sheet.     HELD NUSTEP seat #11 arms #12 Level 1 x 10 minutes [facility]    [employer]     Elliptical     Treadmill     STRENGTHENING      n  n  Y  Y  Y  Y  Y  Y  Y Supine ther-ex  Quad sets  Glut sets  Ball squeezes  Clamshells  SKFO   Marches  Bridges  SAQ  SLR     1 x 10 5 sec  1 x 10 5 sec  2 x 10 3 sec  3 x 10 3 sec Pink band  3 x 10 3 sec Pink band  3 x 10 3 sec Pink band  3 x 10 3 sec Pink band  2 x 10 3 sec 1# B    2 x 10 1# B      Y Seated ther-ex   Sit to stand   2 x 10     n  Y  Y  Y    Y Standing ther-ex  HR/TR  Hip flex  Hip ext  Hip abd  Mini squats  Heel raises   2 x 10  2 x 10  1#  2 x 10  1#  2 x 10  1#    2/10 1#    STRETCHING        n   LE stretching  HS strap stretch  SAUMYA  Frog stretch   3 x 30 seconds  Held SAUMYA due to reports of pain with self stretch  3 x 30 sec    Other stretching     REPEATED MOVEMENTS     NEURO RE-ED  CPT 28400 TOTAL TIME FOR SESSION Not performed    COORDINATION     POSTURAL RE-ED        PRE-GAIT ACTIVITIES      BALANCE TRAINING      Sitting balance        n  n Standing balance  FOAM marching  SLS  Modified tandem stance   20 x  3 x 30 sec B  3 x 30 sec B    GAIT TRAINING  CPT 69665 TOTAL TIME FOR SESSION Not performed     n  nv Ambulation   Even terrain with SPC and no AD Uneven terrain w/SPC   Walked from ortho gym to neuro center stairs and back CS    Dynamic gait     n Stair negotiation 1 flight of steps with B railings and [id number]    Curb negotiation     Ramp negotiation     Outdoor ambulation     BWS/vector training     MANUAL  CPT 47305 TOTAL TIME FOR  SESSION Not performed    Stretching 7/25/24 - Attempted gentle HS and gentle SAUMYA stretch, held due to pain    Mobilization      Massage     Taping      7/9/24

## 2024-08-05 NOTE — OP PT TREATMENT LOG
"    ORTHO PT FLOWSHEET    Y/N Exercises Current Session Time    MODALITIES- HEAT/ICE  CPT 30644 TOTAL TIME FOR SESSION Not performed    Heat     Ice/Vasocompression     Mechanical Traction     THER ACT  CPT 65915 TOTAL TIME FOR SESSION 10 minutes    Pt Education Pt educated re: POC, objective findings, importance of attendance and performance of HEP.  7/29/24 pt and pts daughter educated re:  PN results and plan for continued PT.  If vestibular PT indicated and cause of balance deficits and gait dysfunction will discuss need for ortho and vestibular PT simultaneously.    Transfer training            y Pain, falls, meds and vitals Monitored pre rx, and post visit   7/25/24 - performed numerous vitals throughout treatment due to lower than normal reading upon arrival.  Also performed orthostatic readings as per flow sheet  8/5/24 - vitals, pt slightly \"off\" per daughter coming from OT, she noted usually his blood sugar.  Vitals wnls and after eating tho, pt returned to normal with light easy exercises.    THER EX  CPT 11200 TOTAL TIME FOR SESSION  45 minutes    CARDIOVASCULAR      HEP INSTRUCTION Instructed in primary HEP consisting of bridtes, bkfo, quad sets, glut sets.  Given exercise pro sheet and emailed to pts daughter.  See copy  below flow sheet  8/5/24 - added standing hip flex/ext/abd, heel raises,  to HEP see exercise pro sheet.     HELD NUSTEP seat #11 arms #12 Level 1 x 10 minutes [facility]    [email or web address] Bike     Elliptical     Treadmill     STRENGTHENING      n  n  Y  Y  Y  Y  Y  Y  Y Supine ther-ex  Quad sets  Glut sets  Ball squeezes  Clamshells  SKFO   Marches  Bridges  SAQ  SLR     1 x 10 5 sec  1 x 10 5 sec  2 x 10 3 sec  3 x 10 3 sec Pink band  3 x 10 3 sec Pink band  3 x 10 3 sec Pink band  3 x 10 3 sec Pink band  2 x 10 3 sec 1# B    2 x 10 1# B      Y Seated ther-ex   Sit to stand   2 x 10     n  Y  Y  Y    Y Standing ther-ex  HR/TR  Hip flex  Hip ext  Hip abd  Mini squats  Heel " raises   2 x 10  2 x 10  1#  2 x 10  1#  2 x 10  1#    2/10 1#    STRETCHING        n   LE stretching  HS strap stretch  SAUMYA  Frog stretch   3 x 30 seconds  Held SAUMYA due to reports of pain with self stretch  3 x 30 sec    Other stretching     REPEATED MOVEMENTS     NEURO RE-ED  CPT 89250 TOTAL TIME FOR SESSION Not performed    COORDINATION     POSTURAL RE-ED        PRE-GAIT ACTIVITIES      BALANCE TRAINING      Sitting balance        n  n Standing balance  FOAM marching  SLS  Modified tandem stance   20 x  3 x 30 sec B  3 x 30 sec B    GAIT TRAINING  CPT 22853 TOTAL TIME FOR SESSION Not performed     n  nv Ambulation   Even terrain with SPC and no AD Uneven terrain w/SPC   Walked from ortho gym to neuro center stairs and back CS    Dynamic gait     n Stair negotiation 1 flight of steps with B railings and cgx1    Curb negotiation     Ramp negotiation     Outdoor ambulation     BWS/vector training     MANUAL  CPT 44775 TOTAL TIME FOR SESSION Not performed    Stretching 7/25/24 - Attempted gentle HS and gentle SAUMYA stretch, held due to pain    Mobilization      Massage     Taping      7/9/24

## 2024-08-05 NOTE — PROGRESS NOTES
Occupational Therapy Visit    OT DAILY NOTE FOR OUTPATIENT THERAPY    Patient: Jose Fuentes   MRN: [id number]  : 1932 89 y.o.  Referring Physician: Sj Chan MD  Date of Visit: 2024      Certification Dates: 24 through 24    Diagnosis:   1. Decreased activities of daily living (ADL)    2. Impaired instrumental activities of daily living (IADL)        Chief Complaints:   ADL dysfunction and return to driving goals s/p fall with right hip fracture    Precautions:  Existing Precautions/Restrictions: fall    TODAY'S VISIT    Time In Session:  Start Time: 1005  Stop Time: 1100  Time Calculation (min): 55 min   History/Vitals/Pain/Encounter Info - 24 1006          Injury History/Precautions/Daily Required Info    Document Type daily treatment     Primary Therapist Kim Bryant MSOTR/L     Chief Complaint/Reason for Visit  ADL dysfunction and return to driving goals s/p fall with right hip fracture     Onset of Illness/Injury or Date of Surgery 24     Referring Physician Grace Stroud MD     Existing Precautions/Restrictions fall     History of present illness/functional impairment Jose Fuentes is a 89-year-old male (patient of Dr. Stroud) being seen for OT evaluation today s/p hospitalization at [facility] (24-24) after a mechanical fall at home with resulting closed fracture of greater trochanter of right femur. He has a PMH significant for HTN, HL, CAD, OA, a-fib. Patient admitted to the hospital for a period of possible unresponsiveness.  He was reportedly difficult to awaken by his wife, but by his reports he was very tired and was in a deep sleep.  He awoke prior to arrival.  He had also been having hip pain after a fall he sustained while trying to prevent his wife from falling.  He was noted to have right greater trochanteric fracture.  Seen by orthopedics, determined that he would respond with nonoperative management. He followed up with ortho  (Dr. Chan) on 6/10/24. Of note, he was admitted to SNF on 5/13/24 and signed himself out AMA one day later. He was evaluated by homecare OT but did not qualify for services.     Patient/Family/Caregiver Comments/Observations pt recevied in waiting room, arrives on time but had forgotten to check in. reiterated check-in process to pt and daughter. both verbalized understanding     Patient reported fall since last visit No        Pain Assessment    Currently in pain No/Denies        Pre Activity Vital Signs    Oxygen Therapy None (Room air)     /54     BP Location Right upper arm     BP Method Manual     Patient Position Sitting                    Daily Treatment Assessment and Plan - 08/05/24 1006          Daily Treatment Assessment and Plan    Progress toward goals Progressing     Daily Outcome Summary today's session included lengthy discussion with pt and daughter re: ongoing and novel OT goals as well as POC in place and anticipated discharge at the end of the POC aroung 8/28. both in agreement. discussed safety recommendations with return to driving, gardening, bathing and generalized item transfer tasks at length with good impact. session also continued training in functional reaching, dynamic balance and standing tolerance as well as added proximal stabilization. see log for full details. pt ealked self to ortho gym with his daughter for ongoing PT session     Plan and Recommendations Continue OT POC addressing his functional balance, endurance and strength and coordination deficits in order to improve his independence in higher level iADLs and leisure pursuits (travel and hobbies) to improve quality of life. anticipating OT progress note in next session 8/8/24                         OBJECTIVE DATA TAKEN TODAY    None Taken    Today's Treatment:    OT NEURO FLOW SHEET    EXERCISES CURRENT SESSION TIME   NEURO RE-ED  CPT 62442 TOTAL TIME FOR SESSION 30 minutes   AAROM/AROM     Strengthening UBE SCIFIT  addressing pt's reed strength, generalized endurance, activity tolerance and reed integration into tasks. Pt performed the following. With dual tasking requirements with pt changing direction every 1 minute for a total of 7 minutes time on upgraded level 3 today.    Completed in stance today and maintained >45RPM throughout for 0.83 miles distance     Strength     BITs     Gross Motor Control Standing at edge of mat with participation in posterior and overhead weighted item pass (left <> right) with use of 3.3lb medicine ball  2x each direction, 10 reps in each set.     Task addressing pts static and dynamic standing tolerance and balance and ability to maintain midline orientation with functional weight shifting to improve safety with iADLs and reduce risk of falls/injury    Some limitation in right shoulder, difficulty with full overhead flexion, compensations noted. Pt declined use of lighted object when offered.    Fine Motor Control     Sitting Margareth/Balance     Standing Margareth/Balance Tennis Racquet rolls/bounces performed in stance at edge of mat addressing visual motor coordination, dynamic balance, functional weight shifting, stand tolerance and proprioception.   Pt performed perimeter rolls x15 repetitions in right and then left UE and then bounces (as able) in right and then left UE. Improving over time with both. No loss of balance elicited. Standing tolerance >15 minutes.      Retraining     THERE ACT  CPT 89058 TOTAL TIME FOR SESSION 8-22 Minutes  20 minutes   Pain, Vitals, Meds, Etc. Assessed and monitored. Pts daughter present for OT session today, discussed upcoming progress note anticipated for next visit, after which determination of ongoing skilled need to be discussed. At this time, likely for pt to continue throughout OT POC in place and d/c on/around 9/5/24. Pt and daughter in agreement. Daughter hands off pt's ongoing and increasing independence in basic and iADLs but also some unsafe  "behaviors (ie: weeding without assistance or supervision). Discussed use of rollator to perform tasks in sitting with good impact and demonstration. Lastly discussed ongoing pt goals to return to driving. Pt in general performs well on the driving simulator, however does have difficulty managing the \"virtual reality/simulation\" aspects. Since pt with valid license in place - discussed benefit of trial of driving with supervision in open area (unused school parking lot etc). To trial return to driving in safest way. Pt and daughter in agreement.    Pt also presents with novel goals to continue to address in OT sessions including: fall recovery, getting into/out of cross-legged position, use of Jacuzzi tub. Above also handed off to PT for carryover    Hx:  Pt incidentally shares program from [facility] in which available to borrow or receive permanently dependent on need. Reports he received his rollator (permanently) and wheelchair to borrow. Recommends this program to others, as well as [employer] and the \"e\"gym at the     Hx:  Pt/daughter share one instance of vertigo/vestibular issues roughly 2 weeks ago over the course of 2-3 nights. No visual complaints reported by pt or family member. Following discussion with pt/daughter, at this time it is not recommended pt pursue an OP OT Neuro evaluation. Pt does not demonstrate nor report visual deficits to support additional therapy services. Pt is in agreement with continuing traditional OP OT to address functional goals. Pt will monitor vertigo symptoms and seek PT evaluation for further assessment should this issue continue. This therapist contacted primary PT for awareness of concern.     HEP Dicussed integration of above gross motor and standing balance activities into home HEP with safety precautions in place (supervision vs standing therex in front of secure countertop). Pt and daughter eager to incorporate racquet and unilateral passing therex.    Functional " Mobility CLS with rollator throughout hospital setting    Transfers     THERE EX  CPT 67846 TOTAL TIME FOR SESSION Not performed   PROM     Activity Tolerance     SELF-CARE  CPT 74240 TOTAL TIME FOR SESSION 5 minutes   Pt Education/Safety Discussed return to driving goals as pt has valid license at this time, recommended return to driving in open areas (parking lots etc) with supervision by pt's daughter to trial return to driving in a safe environment. Pt tolerates driving simulator well, however has difficulty navigating the virtual reality aspects of it at this time.    ADL Training     IADL Training     MODALITIES  CPT 81906 TOTAL TIME FOR SESSION Not performed   Ice/Heat     ATTENDED E-STIM  CPT 27629 TOTAL TIME FOR SESSION Not performed   Attended E-Stim     SPLINTING  CPT 68410 TOTAL TIME FOR SESSION Not performed   Fabrication/Check Out     Fit     Training     GROUP  CPT 64371 TOTAL TIME FOR SESSION Not performed

## 2024-08-05 NOTE — PROGRESS NOTES
"Physical Therapy Visit    PT DAILY NOTE FOR OUTPATIENT THERAPY    Patient: Jose Fuentes MRN: [id number]  : 1932 89 y.o.  Referring Physician: Grace Stroud MD  Date of Visit: 2024    Certification Dates: 24 through 24    Diagnosis:   1. Benign paroxysmal positional vertigo, unspecified laterality    2. Vertigo        Chief Complaints:  vertigo    Precautions:   Existing Precautions/Restrictions: cardiac  Precautions comments: CAD, tachycardia      TODAY'S VISIT    Time In Session:  Start Time: 1306  Stop Time: 1337  Time Calculation (min): 31 min   History/Vitals/Pain/Encounter Info - 24 1308          Injury History/Precautions/Daily Required Info    Document Type daily treatment     Primary Therapist Sonal Sheldon, PT, MSPT     Chief Complaint/Reason for Visit  vertigo     Onset of Illness/Injury or Date of Surgery 24     Referring Physician Grace Stroud MD     Existing Precautions/Restrictions cardiac     Precautions comments CAD, tachycardia     History of present illness/functional impairment Jose Fuentes is a 89 year old male who reports approximately 2 weeks ago he woke up during the middle of the night and the bed was spinning. He reports his daughter came and \"recalibrated his crystals\" by putting his head back and the vertigo resolved. The patient reports he had vertigo again the next evening with less intensity and resolved without treatment. He reports he had dizziness during the night 2 days ago. He reports he has dizziness when he lies on his back or rolls to either side however it occurs inconsistently. The patient denies a history of vertigo. He is currently sleeping with his head elevated. He sustained a R hip fracture on 24 from a fall and is currently receiving ortho treatment in PT at [facility]. He is using a rollator walker.     Patient/Family/Caregiver Comments/Observations The patient reports no vertigo since evaluation. He reports he is " still sleeping with the head of the bed inclined but he had no vertigo lying on 1 pillow in PT today.     Patient reported fall since last visit No        Pain Assessment    Currently in pain No/Denies        Pain Intervention    Intervention  monitor during session     Post Intervention Comments no change        Pre Activity Vital Signs    Pulse 60     /56     BP Location Left upper arm     BP Method Manual     Patient Position Sitting                    Daily Treatment Assessment and Plan - 08/05/24 1308          Daily Treatment Assessment and Plan    Progress toward goals Progressing     Daily Outcome Summary The patient reports no vertigo since previous session. Positional testing (-) for BPPV. He reported no dizziness with lying flat, rolling, head turns, or head nods. He had mild lightheadedness when sitting up that he thinks is related to BP.  The chart will be kept open x 2 weeks. If vertigo does not recur in the next 2 weeks the patient will be discharged from vestibular PT.     Plan and Recommendations RA for BPPV if vertigo recurs in the next 2 weeks. If it does not, patient will be discharged.                         OBJECTIVE DATA TAKEN TODAY:    Vestibular     PT Vestibular Evaluation - 08/05/24 1300          Frenzel's Exam    Spontaneous Nystagmus WNL     Gaze Evoked Nystagmus Abnormal     Comments ,ild LBN in L gaze        BPPV    Right Hollis Hallpike  WNL;Modified with tilt table     Left Hollis Hallpike  WNL;Modified with tilt table     Sit to Supine WNL     Horizontal Canal/Roll Test WNL     Interpretation of Results (-) BPPV                     Today's Treatment:    VESTIBULAR PT FLOWSHEET    P.T. TREATMENT LOG   Precautions: cardiac, falls, R hip fracture, [place]   Eval Date: 8/2/24 Progress Note:   POC expires: 9/1/24 Insurance Limits:   Treatment Current Session Time   CANALITH  REPOSITIONING TREATMENT  (CPT 08798) Y/N Notes Not performed   CRT      NEURO RE-ED  (CPT 80796) Y/N Notes 8-22  Minutes   BPPV ASSESSMENT y See vestibular flowsheet   VOR CANCELLATION                      Standing H/VVOR-C     Ambulation w/ H/VVOR-C     VOR / GAZE STABILITY     Standing H/VVOR     Ambulation w/H/VVOR     H/VVOR on compliant surfaces     Functional VOR     HABITUATION     Ball circles     Repetitive functional movements     BALANCE- STATIC     On floor     Airex foam     Rockerboard     BALANCE- DYNAMIC     Ambulation-head turns/nods     Ambulation - 180 & 360 degree turns     Retro ambulation EO     Ambulation with EC     Obstacles     Tandem     *Objective Measures   MSQ     HOT / COLD PACKS  (CPT 60454) Y/N  Not performed   Heat     Ice     THER ACT  (CPT 81056) Y/N  8-22 Minutes   Review pain, meds, falls, vitals, symptoms y completed   *Subjective Measures   DHI     Patient Education/HEP y The findings of the positional testing and the POC were discussed with the patient and his daughter. PT explained nystagmus that is seen with BPPV and risk factors for BPPV. They verbalized good understanding and are in agreement with the POC to keep his chart open for 2 more weeks.  If no vertigo in the next 2 weeks, he will be discharged from vestibular PT.             Date: _8/2/24  __Baseline Level (0-5):_0/5___ MSQ:_0%      Position Change Intensity Adjusted Intensity Duration Score   1.Sitting to Supine    0   2.Supine to Left Side Lying    0   3.Supine to Right Side Lying    0   4.Supine to Sitting    0   5.Left Hallpike    0   6.Return to Sitting    0   7.Right Hallpike    0   8.Return to Sitting    0   9.Sitting to Nose to Left Knee    0   10.Return to Sitting    0   11.Sitting to Nose to Right Knee    0   12.Return to Sitting    0   13.Sitting with Head Rotation (Side-Side 5x)    0   14.Sitting with Head Flex & Ext (Nod 5x)    0   15.Standing with Turn 180* to Right    N/T   16.Standing with Turn 180* to Left    N/T       Total   Score = 0     Intensity: Scale from 0 to 5 (0 = No sx; 5 = Severe sx per pt's  subjective report)  Adjusted Intensity: Intensity Level - Baseline Level  Duration: Scale from 0 to 3 for return to baseline (5-10 sec = 1, 11-30 sec = 2, >30 sec = 3)  Score: Adjusted Intensity + Duration    Motion Sensitivity Quotient: # Provoking Positions x Total Score x100 =  ________________                                                                               2048                                 *If <16 positions are tested, 2048 is replaced with 8 x (# of positions tested)2   (ex: Tested 12 positions, so 8 x (12)2 = 1152 and new MSQ is (# Provoking Positions x Total Score)/1152    Nima FW, Bob MJ. Validity and reliability of the Motion Sensitivity Test.    Journal of Rehabilitation Research and Development. 2003;40(5):415-422.

## 2024-08-08 ENCOUNTER — HOSPITAL ENCOUNTER (OUTPATIENT)
Dept: PHYSICAL THERAPY | Facility: REHABILITATION | Age: 89
Setting detail: THERAPIES SERIES
Discharge: HOME | End: 2024-08-08
Attending: SPECIALIST
Payer: MEDICARE

## 2024-08-08 ENCOUNTER — HOSPITAL ENCOUNTER (OUTPATIENT)
Dept: OCCUPATIONAL THERAPY | Facility: REHABILITATION | Age: 89
Setting detail: THERAPIES SERIES
Discharge: HOME | End: 2024-08-08
Attending: INTERNAL MEDICINE
Payer: MEDICARE

## 2024-08-08 DIAGNOSIS — Z78.9 IMPAIRED INSTRUMENTAL ACTIVITIES OF DAILY LIVING (IADL): ICD-10-CM

## 2024-08-08 DIAGNOSIS — R26.2 DIFFICULTY IN WALKING: ICD-10-CM

## 2024-08-08 DIAGNOSIS — S72.111A DISPLACED FRACTURE OF GREATER TROCHANTER OF RIGHT FEMUR, INITIAL ENCOUNTER FOR CLOSED FRACTURE (CMS/HCC): Primary | ICD-10-CM

## 2024-08-08 DIAGNOSIS — Z78.9 DECREASED ACTIVITIES OF DAILY LIVING (ADL): Primary | ICD-10-CM

## 2024-08-08 PROCEDURE — 97112 NEUROMUSCULAR REEDUCATION: CPT | Mod: GO

## 2024-08-08 PROCEDURE — 97110 THERAPEUTIC EXERCISES: CPT | Mod: GP,CQ,KX

## 2024-08-08 PROCEDURE — 97530 THERAPEUTIC ACTIVITIES: CPT | Mod: GO

## 2024-08-08 PROCEDURE — 97530 THERAPEUTIC ACTIVITIES: CPT | Mod: GP,CQ,KX

## 2024-08-08 ASSESSMENT — 9 HOLE PEG TEST
TESTTIME_SECONDS: 31.2
TEST_RESULT: 27.95

## 2024-08-08 NOTE — OP OT TREATMENT LOG
OT NEURO FLOW SHEET    EXERCISES CURRENT SESSION TIME   NEURO RE-ED  CPT 17471 TOTAL TIME FOR SESSION 42 minutes   AAROM/AROM     Strengthening UBE SCIFIT (white device) addressing pt's reed strength, generalized endurance, activity tolerance and reed integration into tasks. Pt performed the following. With dual tasking requirements with pt changing direction every 2 minutes for a total of 6 minutes time.    Pt with an episode of paleness, and appeared highly fatigued at the end of the UBE engagement, /55, pt provided with juice as he tends to experience low blood sugar at times. Reported improvement after 5 minutes.     Strength  Strength reassessed for progress note, see assessments section for details.     BITs     Gross Motor Control Box and Blocks reassessed for progress note, see assessments section for details.     Fine Motor Control 9 Hole Peg Test reassessed for progress note, see assessments section for details.     Sitting Margareth/Balance     Standing Margareth/Balance     Cognition Trail Making Test reassessed for progress note, see assessments section for details.     Retraining Dynavision reassessed for progress note, see assessments section for details.     Mode B 5 sec (reed UE)  Trial 1: 52 targets, 1.15 ART  Trial 2: 44 targets, 1.36 ART    THERE ACT  CPT 70074 TOTAL TIME FOR SESSION 8-22 Minutes  13 minutes   Assessment PSFS reassessed for progress note, see assessments section for details.    Pain, Vitals, Meds, Etc. Assessed and monitored. Pt made progress today with ongoing functional deficits demonstrated, will benefit from continuation in the POC in place x 1 month. Pt and daughter in agreement    Hx and ongoing:  Pts daughter present for OT session today, discussed upcoming progress note anticipated for next visit, after which determination of ongoing skilled need to be discussed. At this time, likely for pt to continue throughout OT POC in place and d/c on/around 9/5/24. Pt and daughter  "in agreement. Daughter hands off pt's ongoing and increasing independence in basic and iADLs but also some unsafe behaviors (ie: weeding without assistance or supervision). Discussed use of rollator to perform tasks in sitting with good impact and demonstration. Lastly discussed ongoing pt goals to return to driving. Pt in general performs well on the driving simulator, however does have difficulty managing the \"virtual reality/simulation\" aspects. Since pt with valid license in place - discussed benefit of trial of driving with supervision in open area (unused school parking lot etc). To trial return to driving in safest way. Pt and daughter in agreement.    Pt also presents with novel goals to continue to address in OT sessions including: fall recovery, getting into/out of cross-legged position, use of Jacuzzi tub. Above also handed off to PT for carryover    HEP Dicussed integration of above gross motor and standing balance activities into home HEP with safety precautions in place (supervision vs standing therex in front of secure countertop). Pt and daughter eager to incorporate racquet and unilateral passing therex.    Functional Mobility CLS with rollator throughout hospital setting    Transfers     THERE EX  CPT 76711 TOTAL TIME FOR SESSION Not performed   PROM     Activity Tolerance     SELF-CARE  CPT 55858 TOTAL TIME FOR SESSION Not performed   Pt Education/Safety Hx:  Discussed return to driving goals as pt has valid license at this time, recommended return to driving in open areas (parking lots etc) with supervision by pt's daughter to trial return to driving in a safe environment. Pt tolerates driving simulator well, however has difficulty navigating the virtual reality aspects of it at this time.    ADL Training     IADL Training     MODALITIES  CPT 44565 TOTAL TIME FOR SESSION Not performed   Ice/Heat     ATTENDED E-STIM  CPT 27529 TOTAL TIME FOR SESSION Not performed   Attended E-Stim   "   SPLINTING  CPT 59687 TOTAL TIME FOR SESSION Not performed   Fabrication/Check Out     Fit     Training     GROUP  CPT 67422 TOTAL TIME FOR SESSION Not performed

## 2024-08-08 NOTE — OP PT TREATMENT LOG
"    ORTHO PT FLOWSHEET    Y/N Exercises Current Session Time    MODALITIES- HEAT/ICE  CPT 64326 TOTAL TIME FOR SESSION Not performed    Heat     Ice/Vasocompression     Mechanical Traction     THER ACT  CPT 02796 TOTAL TIME FOR SESSION 15 minutes    Pt Education Pt educated re: POC, objective findings, importance of attendance and performance of HEP.  7/29/24 pt and pts daughter educated re:  PN results and plan for continued PT.  If vestibular PT indicated and cause of balance deficits and gait dysfunction will discuss need for ortho and vestibular PT simultaneously.    Transfer training    y        n      y Pain, falls, meds and vitals Monitored pre rx, and post visit   7/25/24 - performed numerous vitals throughout treatment due to lower than normal reading upon arrival.  Also performed orthostatic readings as per flow sheet  8/5/24 - vitals, pt slightly \"off\" per daughter coming from OT, she noted usually his blood sugar.  Vitals wnls and after eating tho, pt returned to normal with light easy exercises.  8/8/24 Vitals still a little low, increased after NUSTEP and standing exercises. Discussed stressors in his life that may be contributing to his feeling off, such as sleep disturbances etc.     THER EX  CPT 50379 TOTAL TIME FOR SESSION 39 minutes    CARDIOVASCULAR      HEP INSTRUCTION Instructed in primary HEP consisting of bridtes, bkfo, quad sets, glut sets.  Given exercise pro sheet and emailed to pts daughter.  See copy  below flow sheet  8/5/24 - added standing hip flex/ext/abd, heel raises,  to HEP see exercise pro sheet.     y NUSTEP seat #11 arms #12 Level 1 x 11 minutes [facility]    Stationary Bike     Elliptical     Treadmill     STRENGTHENING      n  n  n  n  n  n  n  n  n Supine ther-ex  Quad sets  Glut sets  Ball squeezes  Clamshells  SKFO   Marches  Bridges  SAQ  SLR     1 x 10 5 sec  1 x 10 5 sec  2 x 10 3 sec  3 x 10 3 sec Pink band  3 x 10 3 sec Pink band  3 x 10 3 sec Pink band  3 x " 10 3 sec Pink band  2 x 10 3 sec 1# B    2 x 10 1# B      n Seated ther-ex   Sit to stand   2 x 10     y  y  y  y    y Standing ther-ex  HR/TR  Hip flex  Hip ext  Hip abd  Mini squats  Heel raises   2 x 10           NO WEIGHT TODAY  2 x 10  1#  2 x 10  1#  2 x 10  1#    2 x 10 1#    STRETCHING        n   LE stretching  HS strap stretch  SAUMYA  Frog stretch   3 x 30 seconds  Held SAUMYA due to reports of pain with self stretch  3 x 30 sec    Other stretching     REPEATED MOVEMENTS     NEURO RE-ED  CPT 66217 TOTAL TIME FOR SESSION Less than 5 minutes    COORDINATION     POSTURAL RE-ED        PRE-GAIT ACTIVITIES      BALANCE TRAINING      Sitting balance        y  y  y  y Standing balance  FOAM marching  SLS  Full tandem stance  Forward and backward walking  Side stepping    20 x  3 x 30 sec B  3 x 30 sec B  6 x Patient added a dance step with good balance  6 x Patient added a dance step with good balance    GAIT TRAINING  CPT 08174 TOTAL TIME FOR SESSION Not performed     n  nv Ambulation   Even terrain with SPC and no AD Uneven terrain w/SPC   Walked from ortho gym to neuro center stairs and back CS    Dynamic gait     n Stair negotiation 1 flight of steps with B railings and [id number]    Curb negotiation     Ramp negotiation     Outdoor ambulation     BWS/vector training     MANUAL  CPT 39778 TOTAL TIME FOR SESSION Not performed    Stretching 7/25/24 - Attempted gentle HS and gentle SAUMYA stretch, held due to pain    Mobilization      Massage     Taping      7/9/24

## 2024-08-08 NOTE — PROGRESS NOTES
Occupational Therapy Progress Note    OT PROGRESS NOTE FOR OUTPATIENT THERAPY    Patient: Jose Fuentes MRN: [id number]  : 1932 89 y.o.  Referring Physician: Sj Chan MD  Date of Visit: 2024      Certification Dates:   24 through 24    Recommended Frequency & Duration:  2 times/week for up to 8 weeks        Diagnosis:   1. Decreased activities of daily living (ADL)    2. Impaired instrumental activities of daily living (IADL)        Chief Complaints:  Chief Complaint   Patient presents with    Balance Deficits    Dec Strength    Dec Coordination    Decreased Endurance    Cognition    Self Care Difficulties    Decreased recreational/play activity     Decreased Community Integration       Precautions:  Existing Precautions/Restrictions: fall    TODAY'S VISIT:    Time In Session:  Start Time: 1105  Stop Time: 1200  Time Calculation (min): 55 min   General Information - 24 1104          Session Details    Document Type progress note        General Information    Onset of Illness/Injury or Date of Surgery 24     Referring Physician Grace Stroud MD     History of present illness/functional impairment Jose Fuentes is a 89-year-old male (patient of Dr. Stroud) being seen for OT evaluation today s/p hospitalization at [facility] (24-24) after a mechanical fall at home with resulting closed fracture of greater trochanter of right femur. He has a PMH significant for HTN, HL, CAD, OA, a-fib. Patient admitted to the hospital for a period of possible unresponsiveness.  He was reportedly difficult to awaken by his wife, but by his reports he was very tired and was in a deep sleep.  He awoke prior to arrival.  He had also been having hip pain after a fall he sustained while trying to prevent his wife from falling.  He was noted to have right greater trochanteric fracture.  Seen by orthopedics, determined that he would respond with nonoperative management. He  followed up with ortho (Dr. Chan) on 6/10/24. Of note, he was admitted to SNF on 5/13/24 and signed himself out AMA one day later. He was evaluated by homecare OT but did not qualify for services.     Patient/Family/Caregiver Comments/Observations pt recevied in waiting room, accompanied by his daughter, pleasant and agreeable to OT session and progress note as scheduled, reports engagement in HEP provided     Existing Precautions/Restrictions fall                      Pain/Vitals - 08/08/24 1104          Pain Assessment    Currently in pain No/Denies        Pre Activity Vital Signs    Oxygen Therapy None (Room air)     /62     BP Location Right upper arm     BP Method Manual     Patient Position Sitting                    OT - 08/08/24 1104          Occupational Therapy Encounter Type Details    Occupational Therapy Specialty Traditional Neuro Program OT        OT Frequency and Duration    Frequency of treatment 2 times/week     OT Duration 8 weeks     OT Cert From 07/09/24     OT Cert To 09/03/24     Date OT POC was sent to provider 07/09/24     Signed OT Plan of Care received?  Yes                    Assessment - 08/08/24 1110          Assessment    Plan of Care reviewed and patient/family in agreement Yes     System Pathology/Pathophysiology Noted musculoskeletal     Functional Limitations in Following Categories self-care;home management;work;community/leisure;other (see comments)     Problem List: Occupational Therapy coordination impaired;motor control impaired;strength decreased;impaired balance     Demonstrates Need for Referral to Another Service: Occupational Therapy  rehabilitation     Actions taken will refer if/when appropriate     Rehab Potential/Prognosis: Occupational Therapy good, to achieve stated therapy goals     Clinical Assessment Jose Fuentes is a 89-year-old male (patient of Dr. Stroud) being seen for outpatient OT services s/p hospitalization at [facility]  (5/8/24-5/13/24) after a mechanical fall at home with resulting closed fracture of greater trochanter of right femur. He has a PMH significant for HTN, HL, CAD, OA, a-fib. Seen by orthopedics, determined that he would respond with nonoperative management. He followed up with ortho (Dr. Chan) on 6/10/24. Was originally partial weight bearing, now deemed WBAT. He presents today for his outpatient OT first progress note on his 9th visit. Quantitative measures reassessed in the areas of reed  strength, gross and fine motor coordination, visual motor reaction time and divided attention.  pt improved in most areas reassessed today. Notably in  reassessment, pt is now scoring WNL bilaterally, with right UE improving by 6lbs to 66lbs grasped and left UE also scoring WNL from prior assessment at 62lbs. On 9 Hole Peg Test, pt improved bilaterally, by 6 seconds on the right hand (now completing in 27 seconds time and WFL. His left UE also improved by 3 seconds, to completing in 31 seconds overall). On Box and Blocks, pt’s left UE improved by 2 blocks (able to transfer 48 blocks/minute), however his right hand did not improve today, transferring 49 blocks/minute. Lastly pt’s Dynavision reaction time remains stable at 52 targets, 1.15 sec/target. PSFS also reassessed with self-report today of 32% function as compared to 14% upon initial evaluation. Due to the above improvements made in addition to ongoing deficits presented, pt would benefit from continuation in the skilled outpatient OT POC in place, continuing to address his functional balance, endurance and strength and coordination deficits in order to improve his independence in higher level iADLs and leisure pursuits (travel and hobbies) to improve quality of life and reduce risk of falls and additional injury. Pt and daughter in agreement     Plan and Recommendations pt would benefit from continuation in the skilled outpatient OT POC in place, continuing to  address his functional balance, endurance and strength and coordination deficits in order to improve his independence in higher level iADLs and leisure pursuits (travel and hobbies) to improve quality of life and reduce risk of falls and additional injury. Pt and daughter in agreement     Planned Services CPT 94509 Manual therapy;CPT 87061 Neuromuscular Reeducation;CPT 08779 Orthotics training (Initial encounter);CPT 98206 Orthotics/Prosthetics Management and training (Subsequent encounters);CPT 51171 Self-care/Home management training;CPT 77993 Therapeutic activities;CPT 47190 Therapeutic exercises;CPT 93318 Therapeutic Massage;CPT 42498 Hot/Cold Packs therapy;CPT 23617 Paraffin Bath     Comments/Additional Services BITs, Dynavision, UBE SCIFIT, Driving Simulator, Work Hardening dept, [facility]                     OBJECTIVE MEASUREMENTS/DATA:    Gross and Fine Motor     Gross and Fine Motor - 08/08/24 1110          Hand  Strength Testing    Left Hand, Setting 2 68.2, 60.5, 58.5lbs average = 62.4lbs (norm = 55lbs) already WNL     Right Hand, Setting 2 66, 65.3, 66.3 average = 66lbs (norm = 65.7lbs) (6lb increase) now WNL                   Outcome Measures    Outcome Measures - 08/08/24 1110          Objective Outcome Measures    RIGHT hand: Box and Blocks Assessment 49 blocks (norm = 63 blocks) 4 block decrease   Trial 1: 49 blocks Trial 2: 54 blocks    LEFT hand: Box and Blocks Assessment 48 blocks (norm = 61 blocks) 2 block increase     9 Hole Peg Test - RIGHT HAND TIME 27.95   6 seconds faster, now [facility]    9 Hole Peg Test - LEFT HAND TIME 31.2   Trial 1: 36 seconds, Trial 2: 31 seconds (3 second increase)    9 Hole Peg Test - COMMENTS to be assessed (norms: R = 27.5 seconds, L = 29.5 seconds)     Trail Making Part A - TIME 39   5 second improvement    Trail Making Part A - ERRORS 0   1 self-corrected error immediately    Trail Making Part B - TIME 68     Trail Making Part B - ERRORS 2        NEW  (2/6/23):  PSFS     PSFS ACTIVITY 1 retun to leisure activity of reading     PSFS ANSWER 1 2   due to reduced concentration    PSFS ACTIVITY 2 return leisure activity of model building     PSFS ANSWER 2 2     PSFS ACTIVITY 3 balance     PSFS ANSWER 3 5     PSFS ACTIVITY 4 return to driving     PSFS ANSWER 4 5     PSFS ACTIVITY 5 gardening     PSFS ANSWER 5 2     PSFS Sum of Activity Scores 16     PSFS Number of Activities 5     PSFS Percent Score 32 %        OT Vision Exercises/Activities    Dynavision Mode B 5 sec (reed UE)  Trial 1: 52 targets, 1.15 ART  Trial 2: 44 targets, 1.36 ART        Visual Reaction Timing    Dynavision Score (Mode B, 5 sec light timer) Targets 52     Dynavision Score Avg response time 1.15 Seconds     Dynavision Score Overall Impaired                       Outcome Measures          7/9/2024    16:07 7/18/2024    17:10 7/23/2024    15:36 8/8/2024    11:10   OT OBJECTIVE Outcome Measures   9 Hole Peg Test - Right Hand  33  27.95       6 seconds faster, now [facility]   9 Hole Peg Test - Left Hand  34  31.2       Trial 1: 36 seconds, Trial 2: 31 seconds (3 second increase)   9 Hole Peg Test - Comments to be assessed (norms: R = 27.5 seconds, L = 29.5 seconds)   to be assessed (norms: R = 27.5 seconds, L = 29.5 seconds)   Right Hand Box and Blocks 53 blocks (norm = 63 blocks)   49 blocks (norm = 63 blocks) 4 block decrease       Trial 1: 49 blocks Trial 2: 54 blocks   Left Hand Box and Blocks 46 blocks (norm = 61 blocks)   48 blocks (norm = 61 blocks) 2 block increase   [place] Total Time   55.94 Seconds    [place] Total Errors   0    Dynavision - Targets  49 52 52   Dynavision - Seconds  1.22 Seconds 1.13 Seconds 1.15 Seconds   Dynavision - Impression   Impaired Impaired   Trail Making Part A - Time 44   39       5 second improvement   Trail Making Part A - Errors 1       (self-corrected haphazard error)   0       1 self-corrected error immediately   Trail Making Part B - Time 87       Trial  1: terminated at 48 seconds with multiple errors (first error omitted 3). Trial 2: pt counted aloud with increased accuracy throughout.   68   Trail Making Part B - Errors 0   2   OT SUBJECTIVE Outcome Measures   PSFS % Score  14 %  32 %       Today's Treatment::    Education provided:  Yes: See treatment log for details of education provided    OT NEURO FLOW SHEET    EXERCISES CURRENT SESSION TIME   NEURO RE-ED  CPT 31134 TOTAL TIME FOR SESSION 42 minutes   AAROM/AROM     Strengthening UBE SCIFIT (white device) addressing pt's reed strength, generalized endurance, activity tolerance and reed integration into tasks. Pt performed the following. With dual tasking requirements with pt changing direction every 2 minutes for a total of 6 minutes time.    Pt with an episode of paleness, and appeared highly fatigued at the end of the UBE engagement, /55, pt provided with juice as he tends to experience low blood sugar at times. Reported improvement after 5 minutes.     Strength  Strength reassessed for progress note, see assessments section for details.     BITs     Gross Motor Control Box and Blocks reassessed for progress note, see assessments section for details.     Fine Motor Control 9 Hole Peg Test reassessed for progress note, see assessments section for details.     Sitting [place]/Balance     Standing [place]/Balance     Cognition Trail Making Test reassessed for progress note, see assessments section for details.     Retraining Dynavision reassessed for progress note, see assessments section for details.     Mode B 5 sec (reed UE)  Trial 1: 52 targets, 1.15 ART  Trial 2: 44 targets, 1.36 ART    THERE ACT  CPT 53309 TOTAL TIME FOR SESSION 8-22 Minutes  13 minutes   Assessment PSFS reassessed for progress note, see assessments section for details.    Pain, Vitals, Meds, Etc. Assessed and monitored. Pt made progress today with ongoing functional deficits demonstrated, will benefit from continuation in the POC  "in place x 1 month. Pt and daughter in agreement    Hx and ongoing:  Pts daughter present for OT session today, discussed upcoming progress note anticipated for next visit, after which determination of ongoing skilled need to be discussed. At this time, likely for pt to continue throughout OT POC in place and d/c on/around 9/5/24. Pt and daughter in agreement. Daughter hands off pt's ongoing and increasing independence in basic and iADLs but also some unsafe behaviors (ie: weeding without assistance or supervision). Discussed use of rollator to perform tasks in sitting with good impact and demonstration. Lastly discussed ongoing pt goals to return to driving. Pt in general performs well on the driving simulator, however does have difficulty managing the \"virtual reality/simulation\" aspects. Since pt with valid license in place - discussed benefit of trial of driving with supervision in open area (unused school parking lot etc). To trial return to driving in safest way. Pt and daughter in agreement.    Pt also presents with novel goals to continue to address in OT sessions including: fall recovery, getting into/out of cross-legged position, use of Jacuzzi tub. Above also handed off to PT for carryover    HEP Dicussed integration of above gross motor and standing balance activities into home HEP with safety precautions in place (supervision vs standing therex in front of secure countertop). Pt and daughter eager to incorporate racquet and unilateral passing therex.    Functional Mobility CLS with rollator throughout hospital setting    Transfers     THERE EX  CPT 28933 TOTAL TIME FOR SESSION Not performed   PROM     Activity Tolerance     SELF-CARE  CPT 35718 TOTAL TIME FOR SESSION Not performed   Pt Education/Safety Hx:  Discussed return to driving goals as pt has valid license at this time, recommended return to driving in open areas (parking lots etc) with supervision by pt's daughter to trial return to driving in " a safe environment. Pt tolerates driving simulator well, however has difficulty navigating the virtual reality aspects of it at this time.    ADL Training     IADL Training     MODALITIES  CPT 97800 TOTAL TIME FOR SESSION Not performed   Ice/Heat     ATTENDED E-STIM  CPT 17074 TOTAL TIME FOR SESSION Not performed   Attended E-Stim     SPLINTING  CPT 51970 TOTAL TIME FOR SESSION Not performed   Fabrication/Check Out     Fit     Training     GROUP  CPT 73754 TOTAL TIME FOR SESSION Not performed                  Goals Addressed                      This Visit's Progress      OT Goals: STG/LTG         Short Term Goals Time Frame Result Comment/Progress   Continue ongoing assessment of MoCA, Dynavision, 9 Hole Peg Test and PSFS and set goals as appropriate 1-2 weeks Met    Improve right UE hand strength as measured by 3-5 lb improvement in  to maximize UE integration.  4 weeks Met 7/9/24  L: 62.4 lbs  R: 60.5lbs    8/8/24  L: 65lbs WNL  R: 66lbs WNL   Improve reed UE gross motor coordination scores via Box and Blocks assessment by 3-5 blocks to maximize functional capacities and functional midline reaching patterns.  4 weeks ongoing 7/9/24  R: 53 blocks  L: 46 blocks    8/8/24  R: 49 blocks  L: 48 blocks   Decrease reed UE 9 Hole Peg Test time by 2 seconds to maximize functional capabilities during fine motor tasks.  4 weeks Met Baseline  R: 33 seconds  L: 34 seconds    8/8/24  R: 27 seconds  L: 31 seconds   Pt will engage in basic driving simulator scenarios targeting pedal and steering reaction time and FOD for determination of risk with return to driving.   4 weeks Met    Pt will score WFL for 50% of predriving assessments: TMT, MoCA, Dynavision, pedal/steering reaction time, [place]  4 weeks Ongoing MoCA WNL   Improve PSFS scores by 10%, reflecting subjective improvement in functional independence and efficiency 4 weeks Met Baseline 14%    8/8/24  32%   Improve readiness for return to IADL/community  reintegration with improved reaction time via Dynavision B 5 sec assessment to </= 1.1 sec.  4 weeks ongoing Baseline  49 targets, 1.22 ART    8/8/24  52 targets, 1.15ART   Pt will be independent with progressively monitored  home exercise program to achieve goal attainment.  4 weeks Met    Improve functional cognition for IADL/community reintegration participation via increase in [place] Making Test assessment by 5 seconds (A and B).  4 weeks Met (time) 7/9/24  A: 44 seconds, 1 error  B: 87 seconds, 0 errors    8/8/24  A: 39 seconds, 0 errors  B: 68 seconds, 2 errors      Long Term Goals Time Frame Result Comment/Progress   Improve right UE hand strength as measured by 5-7lb improvement in  to maximize UE integration.  8 weeks Met reed UE WNL 7/9/24  L: 62.4 lbs  R: 60.5lbs    8/8/24  L: 65lbs WNL  R: 66lbs WNL   Improve reed UE gross motor coordination scores via Box and Blocks assessment by 5-7 blocks to maximize functional capacities and functional midline reaching patterns.  8 weeks ongoing 7/9/24  R: 53 blocks  L: 46 blocks    8/8/24  R: 49 blocks  L: 48 blocks   Decrease reed UE 9 Hole Peg Test time by 4-6 seconds to maximize functional capabilities during fine motor tasks.  8 weeks ongoing Baseline  R: 33 seconds  L: 34 seconds    8/8/24  R: 27 seconds  L: 31 seconds   Pt will engage in advanced driving simulator scenarios targeting naturalistic and hazards levels for determination of risk with return to driving.   8 weeks Met    Pt will score WFL for 100% of predriving assessments: TMT, MoCA, Dynavision, pedal/steering reaction time, [place]  8 weeks ongoing MoCA WNL   Improve PSFS scores by 25%, reflecting subjective improvement in functional independence and efficiency 8 weeks ongoing Baseline 14%    8/8/24  32%   Improve readiness for return to IADL/community reintegration with improved reaction time via Dynavision B 5 sec assessment to </= 1.0 sec.  8 weeks ongoing Baseline  49 targets, 1.22  "ART    8/8/24  52 targets, 1.15ART           OT: patient stated goal (pt-stated)         \"I just want to be able to do what I need to do: get up and down the stairs into the basement and not have to use the walker\"                           "

## 2024-08-08 NOTE — PROGRESS NOTES
"Physical Therapy Visit    PT DAILY NOTE FOR OUTPATIENT THERAPY    Patient: Jose Fuentes MRN: [id number]  : 1932 89 y.o.  Referring Physician: Sj Chan MD  Date of Visit: 2024    Certification Dates: 24 through 10/01/24    Diagnosis:   1. Displaced fracture of greater trochanter of right femur, initial encounter for closed fracture (CMS/[facility])    2. Difficulty in walking        Chief Complaints:  decreased ROM, strength, balance, and gait deviations.    Precautions:   Existing Precautions/Restrictions: cardiac  Precautions comments: CAD, tachycardia      TODAY'S VISIT    Time In Session:  Start Time: 1206  Stop Time: 1300  Time Calculation (min): 54 min   History/Vitals/Pain/Encounter Info - 24 1222          Injury History/Precautions/Daily Required Info    Document Type daily treatment     Primary Therapist Christine Mulvihill, PT     Chief Complaint/Reason for Visit  decreased ROM, strength, balance, and gait deviations.     Onset of Illness/Injury or Date of Surgery 24     Referring Physician Dr. Sj Chan     Existing Precautions/Restrictions cardiac     Precautions comments CAD, tachycardia     History of present illness/functional impairment Jose Fuentes is a 89 year old male who reports approximately 2 weeks ago he woke up during the middle of the night and the bed was spinning. He reports his daughter came and \"recalibrated his crystals\" by putting his head back and the vertigo resolved. The patient reports he had vertigo again the next evening with less intensity and resolved without treatment. He reports he had dizziness during the night 2 days ago. He reports he has dizziness when he lies on his back or rolls to either side however it occurs inconsistently. The patient denies a history of vertigo. He is currently sleeping with his head elevated. He sustained a R hip fracture on 24 from a fall and is currently receiving ortho treatment in PT at " [facility]. He is using a rollator walker.     Patient/Family/Caregiver Comments/Observations Michael arrives with no pain, falls or med changes. He and his daughter are concerned that he seems to have decreased BP and possibly BG levels when he comes to PT. He does indicate that he is going through a very stressful time with some legal issues and did not sleep well last night. His BP was lower at start of session.     Patient reported fall since last visit No        Pain Assessment    Currently in pain No/Denies        Pain Intervention    Intervention  exercise     Post Intervention Comments no pain.        Pre Activity Vital Signs    Pulse 64     SpO2 98 %     /54     BP Location Left upper arm     BP Method Manual     Patient Position Sitting        Post Activity Vital Signs    Post Activity /64     Post Activity BP Location Left upper arm     Post Activity BP Method Manual     Patient Position Sitting                    Daily Treatment Assessment and Plan - 08/08/24 1808          Daily Treatment Assessment and Plan    Progress toward goals Progressing     Daily Outcome Summary Michael arrived with daughter Grant with reports of being a little off again after OT. He was given apple juice and he felt better. He and his daughter reported that he is going through some stressful things in his personal life and he did not sleep well last night. He does have follow up with some of his doctors and will be getting blood work and cardiac checks. His BP was a little low at start of session, but increased after NUSTEP and standing exercises.     Plan and Recommendations Continue to progress as tolerated, monitoring BP.                             Today's Treatment:        ORTHO PT FLOWSHEET    Y/N Exercises Current Session Time    MODALITIES- HEAT/ICE  CPT 72209 TOTAL TIME FOR SESSION Not performed    Heat     Ice/Vasocompression     Mechanical Traction     THER ACT  CPT 41076 TOTAL TIME FOR SESSION 15 minutes    Pt  "Education Pt educated re: POC, objective findings, importance of attendance and performance of HEP.  7/29/24 pt and pts daughter educated re:  PN results and plan for continued PT.  If vestibular PT indicated and cause of balance deficits and gait dysfunction will discuss need for ortho and vestibular PT simultaneously.    Transfer training    y        n      y Pain, falls, meds and vitals Monitored pre rx, and post visit   7/25/24 - performed numerous vitals throughout treatment due to lower than normal reading upon arrival.  Also performed orthostatic readings as per flow sheet  8/5/24 - vitals, pt slightly \"off\" per daughter coming from OT, she noted usually his blood sugar.  Vitals wnls and after eating tho, pt returned to normal with light easy exercises.  8/8/24 Vitals still a little low, increased after NUSTEP and standing exercises. Discussed stressors in his life that may be contributing to his feeling off, such as sleep disturbances etc.     THER EX  CPT 40008 TOTAL TIME FOR SESSION 39 minutes    CARDIOVASCULAR      HEP INSTRUCTION Instructed in primary HEP consisting of bridtes, bkfo, quad sets, glut sets.  Given exercise pro sheet and emailed to pts daughter.  See copy  below flow sheet  8/5/24 - added standing hip flex/ext/abd, heel raises,  to HEP see exercise pro sheet.     y NUSTEP seat #11 arms #12 Level 1 x 11 minutes [facility]    [employer] Bike     Elliptical     Treadmill     STRENGTHENING      n  n  n  n  n  n  n  n  n Supine ther-ex  Quad sets  Glut sets  Ball squeezes  Clamshells  SKFO   Marches  Bridges  SAQ  SLR     1 x 10 5 sec  1 x 10 5 sec  2 x 10 3 sec  3 x 10 3 sec Pink band  3 x 10 3 sec Pink band  3 x 10 3 sec Pink band  3 x 10 3 sec Pink band  2 x 10 3 sec 1# B    2 x 10 1# B      n Seated ther-ex   Sit to stand   2 x 10     y  y  y  y    y Standing ther-ex  HR/TR  Hip flex  Hip ext  Hip abd  Mini squats  Heel raises   2 x 10           NO WEIGHT TODAY  2 x 10  1#  2 x 10  " 1#  2 x 10  1#    2 x 10 1#    STRETCHING        n   LE stretching  HS strap stretch  SAUMYA  Frog stretch   3 x 30 seconds  Held SAUMYA due to reports of pain with self stretch  3 x 30 sec    Other stretching     REPEATED MOVEMENTS     NEURO RE-ED  CPT 52820 TOTAL TIME FOR SESSION Less than 5 minutes    COORDINATION     POSTURAL RE-ED        PRE-GAIT ACTIVITIES      BALANCE TRAINING      Sitting balance        y  y  y  y Standing balance  FOAM marching  SLS  Full tandem stance  Forward and backward walking  Side stepping    20 x  3 x 30 sec B  3 x 30 sec B  6 x Patient added a dance step with good balance  6 x Patient added a dance step with good balance    GAIT TRAINING  CPT 65027 TOTAL TIME FOR SESSION Not performed     n  nv Ambulation   Even terrain with SPC and no AD Uneven terrain w/SPC   Walked from ortho gym to neuro center stairs and back CS    Dynamic gait     n Stair negotiation 1 flight of steps with B railings and cgx1    Curb negotiation     Ramp negotiation     Outdoor ambulation     BWS/vector training     MANUAL  CPT 43028 TOTAL TIME FOR SESSION Not performed    Stretching 7/25/24 - Attempted gentle HS and gentle SAUMYA stretch, held due to pain    Mobilization      Massage     Taping      7/9/24

## 2024-08-14 ENCOUNTER — HOSPITAL ENCOUNTER (OUTPATIENT)
Dept: OCCUPATIONAL THERAPY | Facility: REHABILITATION | Age: 89
Setting detail: THERAPIES SERIES
Discharge: HOME | End: 2024-08-14
Attending: INTERNAL MEDICINE
Payer: MEDICARE

## 2024-08-14 DIAGNOSIS — Z78.9 DECREASED ACTIVITIES OF DAILY LIVING (ADL): Primary | ICD-10-CM

## 2024-08-14 DIAGNOSIS — Z78.9 IMPAIRED INSTRUMENTAL ACTIVITIES OF DAILY LIVING (IADL): ICD-10-CM

## 2024-08-14 PROCEDURE — 97530 THERAPEUTIC ACTIVITIES: CPT | Mod: GO

## 2024-08-14 PROCEDURE — 97112 NEUROMUSCULAR REEDUCATION: CPT | Mod: GO

## 2024-08-14 NOTE — OP OT TREATMENT LOG
OT NEURO FLOW SHEET    EXERCISES CURRENT SESSION TIME   NEURO RE-ED  CPT 00079 TOTAL TIME FOR SESSION 42 minutes   AAROM/AROM     Strengthening UBE SCIFIT (grey device) performed in stance addressing pt's reed strength, generalized endurance, activity tolerance and reed integration into tasks. Pt completed on upgraded resistance level 2.5. Pt performed the following. With dual tasking requirements with pt changing direction every 2 minutes for a total of 10 minutes time.    Pt completed at >43RPMs and completed 1.1 miles     Strength     BITs     Gross Motor Control Standing on Airex to participate in the below weighted medicine ball therex addressing pt's dynamic balance and stability, reaching out of base of support anteriorly, overhead and posteriorly and sustained UE control against gravity and with distal load as needed for safe return to iADLs. Pt performed the following:  - unilateral overhead pass, 2.2lb ball  - reed anterior GHJ flexion, 2.2lb ball  - reed chest press, 2.2lb ball    All completed with place and hold x5 seconds at end range to further challenge balance.    Fine Motor Control Standing on Airex to participate in multiple step cross body reaching task including the following:  - standing on foam  - reaching with right UE to contralateral side for small peg  - in hand manipulation to reporient  - placement into blue theraputty positioned on vertical wall surface overhead  - complete x 5 repetitions   - continue with opposite UE  - removal of all pegs 5 at a time  - completed a total of 25 pegs    Pt expressed significant challenge with this dynamic FM task benefiting from standing rest breaks, cues for realigning position and intermittent stabilization by this therapist    Sitting Margareth/Balance     Standing Margareth/Balance Airex Foam utilized as tool to improve functional dynamic balance on unstable surface as per pt's main goals. Pt tolerated lengthy trials of standing during dynamic gross and fine  "motor tasks (see above) >7-10 minutes each although requires very close contact guard to minimal assist as needed to correct for sway and balance impairment.    Cognition      Retraining     THERE ACT  CPT 72528 TOTAL TIME FOR SESSION 8-22 Minutes  15 minutes   Assessment     Pain, Vitals, Meds, Etc. Assessed and monitored. Discussed with pt anticipated discharge scheduled for the end of his POC with small exception of ending one session early on 8/26 with primary OT. Pt and daughter in agreement. Pt also will likely change appts to not have OT/PT back to back, as had been physically overexerting thus far. Pt's daughter provided with print out of schedule to adjust. Pt will be leaving for vacation to [place] on 8/29/24. Discussed ongoing recommendation re: use of grab bar in shower with standing level ADLs. Pt in agreement and will be ordering an adhesive bar system    Hx and ongoing:  Pts daughter present for OT session today, discussed upcoming progress note anticipated for next visit, after which determination of ongoing skilled need to be discussed. At this time, likely for pt to continue throughout OT POC in place and d/c on/around 9/5/24. Pt and daughter in agreement. Daughter hands off pt's ongoing and increasing independence in basic and iADLs but also some unsafe behaviors (ie: weeding without assistance or supervision). Discussed use of rollator to perform tasks in sitting with good impact and demonstration. Lastly discussed ongoing pt goals to return to driving. Pt in general performs well on the driving simulator, however does have difficulty managing the \"virtual reality/simulation\" aspects. Since pt with valid license in place - discussed benefit of trial of driving with supervision in open area (unused school parking lot etc). To trial return to driving in safest way. Pt and daughter in agreement.    Pt also presents with novel goals to continue to address in OT sessions including: fall recovery, " getting into/out of cross-legged position, use of Jacuzzi tub. Above also handed off to PT for carryover    HEP Simple reed UE HEP provided to pt addressing functional balance with emphasis on weight shift and reaching out of base of support as below        Hx:  Dicussed integration of above gross motor and standing balance activities into home HEP with safety precautions in place (supervision vs standing therex in front of secure countertop). Pt and daughter eager to incorporate racquet and unilateral passing therex.    Functional Mobility CLS with rollator throughout hospital setting, use of SAC throughout therapy gym to and from UBE    Transfers     THERE EX  CPT 94737 TOTAL TIME FOR SESSION Not performed   PROM     Activity Tolerance     SELF-CARE  CPT 77462 TOTAL TIME FOR SESSION Not performed   Pt Education/Safety Hx:  Discussed return to driving goals as pt has valid license at this time, recommended return to driving in open areas (parking lots etc) with supervision by pt's daughter to trial return to driving in a safe environment. Pt tolerates driving simulator well, however has difficulty navigating the virtual reality aspects of it at this time.    ADL Training     IADL Training     MODALITIES  CPT 01947 TOTAL TIME FOR SESSION Not performed   Ice/Heat     ATTENDED E-STIM  CPT 75659 TOTAL TIME FOR SESSION Not performed   Attended E-Stim     SPLINTING  CPT 97263 TOTAL TIME FOR SESSION Not performed   Fabrication/Check Out     Fit     Training     GROUP  CPT 24165 TOTAL TIME FOR SESSION Not performed

## 2024-08-14 NOTE — PROGRESS NOTES
Occupational Therapy Visit    OT DAILY NOTE FOR OUTPATIENT THERAPY    Patient: Jose Fuentes   MRN: [id number]  : 1932 89 y.o.  Referring Physician: Sj Chan MD  Date of Visit: 2024      Certification Dates: 24 through 24    Diagnosis:   1. Decreased activities of daily living (ADL)    2. Impaired instrumental activities of daily living (IADL)        Chief Complaints:   ADL dysfunction and return to driving goals s/p fall with right hip fracture    Precautions:  Existing Precautions/Restrictions: fall    TODAY'S VISIT    Time In Session:  Start Time: 1037  Stop Time: 1134  Time Calculation (min): 57 min   History/Vitals/Pain/Encounter Info - 24 1041          Injury History/Precautions/Daily Required Info    Document Type daily treatment     Primary Therapist Kim Bryant MSOTR/L     Chief Complaint/Reason for Visit  ADL dysfunction and return to driving goals s/p fall with right hip fracture     Onset of Illness/Injury or Date of Surgery 24     Referring Physician Grace Stroud MD     Existing Precautions/Restrictions fall     History of present illness/functional impairment Jose Fuentes is a 89-year-old male (patient of Dr. Stroud) being seen for OT evaluation today s/p hospitalization at [facility] (24-24) after a mechanical fall at home with resulting closed fracture of greater trochanter of right femur. He has a PMH significant for HTN, HL, CAD, OA, a-fib. Patient admitted to the hospital for a period of possible unresponsiveness.  He was reportedly difficult to awaken by his wife, but by his reports he was very tired and was in a deep sleep.  He awoke prior to arrival.  He had also been having hip pain after a fall he sustained while trying to prevent his wife from falling.  He was noted to have right greater trochanteric fracture.  Seen by orthopedics, determined that he would respond with nonoperative management. He followed up with ortho  (Dr. Chan) on 6/10/24. Of note, he was admitted to SNF on 5/13/24 and signed himself out AMA one day later. He was evaluated by homecare OT but did not qualify for services.     Patient/Family/Caregiver Comments/Observations pt reports he saw podiatry who prescibed him new exercises addressing functional ambulation skills to correct gait pattern (per pt report)     Patient reported fall since last visit No        Pain Assessment    Currently in pain No/Denies        Pre Activity Vital Signs    Oxygen Therapy None (Room air)        Activity Vital Signs    /55     BP Location Left upper arm     BP Method Manual     Patient Position Sitting                    Daily Treatment Assessment and Plan - 08/14/24 1041          Daily Treatment Assessment and Plan    Progress toward goals Progressing     Daily Outcome Summary pt tolerated session well which included an emphasized higher level dynamic balance and functional reaching tasks with distal physical load on unstable surfaces (Arex Foam) concurrent with gross and Fine motor task demands. pt also participated in standing level engagement in UBE SCIFIT on upgraded resistance. tolerated all well, asymptomatic throughout. see log for details. pt walked self out with daughter, NAD     Plan and Recommendations pt would benefit from continuation in the skilled outpatient OT POC in place, continuing to address his functional balance, endurance and strength and coordination deficits in order to improve his independence in higher level iADLs and leisure pursuits (travel and hobbies) to improve quality of life and reduce risk of falls and additional injury. anticipated discharge fron outpatient OT on 8/26/24 with primary OT                         OBJECTIVE DATA TAKEN TODAY    None Taken    Today's Treatment:    OT NEURO FLOW SHEET    EXERCISES CURRENT SESSION TIME   NEURO RE-ED  CPT 51381 TOTAL TIME FOR SESSION 42 minutes   AAROM/AROM     Strengthening UBE SCIFIT (grey  device) performed in stance addressing pt's reed strength, generalized endurance, activity tolerance and reed integration into tasks. Pt completed on upgraded resistance level 2.5. Pt performed the following. With dual tasking requirements with pt changing direction every 2 minutes for a total of 10 minutes time.    Pt completed at >43RPMs and completed 1.1 miles     Strength     BITs     Gross Motor Control Standing on Airex to participate in the below weighted medicine ball therex addressing pt's dynamic balance and stability, reaching out of base of support anteriorly, overhead and posteriorly and sustained UE control against gravity and with distal load as needed for safe return to iADLs. Pt performed the following:  - unilateral overhead pass, 2.2lb ball  - reed anterior GHJ flexion, 2.2lb ball  - reed chest press, 2.2lb ball    All completed with place and hold x5 seconds at end range to further challenge balance.    Fine Motor Control Standing on Airex to participate in multiple step cross body reaching task including the following:  - standing on foam  - reaching with right UE to contralateral side for small peg  - in hand manipulation to reporient  - placement into blue theraputty positioned on vertical wall surface overhead  - complete x 5 repetitions   - continue with opposite UE  - removal of all pegs 5 at a time  - completed a total of 25 pegs    Pt expressed significant challenge with this dynamic FM task benefiting from standing rest breaks, cues for realigning position and intermittent stabilization by this therapist    Sitting Margareth/Balance     Standing Margareth/Balance Airex Foam utilized as tool to improve functional dynamic balance on unstable surface as per pt's main goals. Pt tolerated lengthy trials of standing during dynamic gross and fine motor tasks (see above) >7-10 minutes each although requires very close contact guard to minimal assist as needed to correct for sway and balance impairment.   "  Cognition      Retraining     THERE ACT  CPT 95465 TOTAL TIME FOR SESSION 8-22 Minutes  15 minutes   Assessment     Pain, Vitals, Meds, Etc. Assessed and monitored. Discussed with pt anticipated discharge scheduled for the end of his POC with small exception of ending one session early on 8/26 with primary OT. Pt and daughter in agreement. Pt also will likely change appts to not have OT/PT back to back, as had been physically overexerting thus far. Pt's daughter provided with print out of schedule to adjust. Pt will be leaving for vacation to [place] on 8/29/24. Discussed ongoing recommendation re: use of grab bar in shower with standing level ADLs. Pt in agreement and will be ordering an adhesive bar system    Hx and ongoing:  Pts daughter present for OT session today, discussed upcoming progress note anticipated for next visit, after which determination of ongoing skilled need to be discussed. At this time, likely for pt to continue throughout OT POC in place and d/c on/around 9/5/24. Pt and daughter in agreement. Daughter hands off pt's ongoing and increasing independence in basic and iADLs but also some unsafe behaviors (ie: weeding without assistance or supervision). Discussed use of rollator to perform tasks in sitting with good impact and demonstration. Lastly discussed ongoing pt goals to return to driving. Pt in general performs well on the driving simulator, however does have difficulty managing the \"virtual reality/simulation\" aspects. Since pt with valid license in place - discussed benefit of trial of driving with supervision in open area (unused school parking lot etc). To trial return to driving in safest way. Pt and daughter in agreement.    Pt also presents with novel goals to continue to address in OT sessions including: fall recovery, getting into/out of cross-legged position, use of Jacuzzi tub. Above also handed off to PT for carryover    HEP Simple reed UE HEP provided to pt addressing " functional balance with emphasis on weight shift and reaching out of base of support as below        Hx:  Dicussed integration of above gross motor and standing balance activities into home HEP with safety precautions in place (supervision vs standing therex in front of secure countertop). Pt and daughter eager to incorporate racquet and unilateral passing therex.    Functional Mobility CLS with rollator throughout hospital setting, use of SAC throughout therapy gym to and from UBE    Transfers     THERE EX  CPT 20750 TOTAL TIME FOR SESSION Not performed   PROM     Activity Tolerance     SELF-CARE  CPT 63135 TOTAL TIME FOR SESSION Not performed   Pt Education/Safety Hx:  Discussed return to driving goals as pt has valid license at this time, recommended return to driving in open areas (parking lots etc) with supervision by pt's daughter to trial return to driving in a safe environment. Pt tolerates driving simulator well, however has difficulty navigating the virtual reality aspects of it at this time.    ADL Training     IADL Training     MODALITIES  CPT 09537 TOTAL TIME FOR SESSION Not performed   Ice/Heat     ATTENDED E-STIM  CPT 21547 TOTAL TIME FOR SESSION Not performed   Attended E-Stim     SPLINTING  CPT 51405 TOTAL TIME FOR SESSION Not performed   Fabrication/Check Out     Fit     Training     GROUP  CPT 61028 TOTAL TIME FOR SESSION Not performed

## 2024-08-15 ENCOUNTER — HOSPITAL ENCOUNTER (OUTPATIENT)
Dept: PHYSICAL THERAPY | Facility: REHABILITATION | Age: 89
Setting detail: THERAPIES SERIES
Discharge: HOME | End: 2024-08-15
Attending: SPECIALIST
Payer: MEDICARE

## 2024-08-15 DIAGNOSIS — R26.2 DIFFICULTY IN WALKING: ICD-10-CM

## 2024-08-15 DIAGNOSIS — S72.111A DISPLACED FRACTURE OF GREATER TROCHANTER OF RIGHT FEMUR, INITIAL ENCOUNTER FOR CLOSED FRACTURE (CMS/HCC): Primary | ICD-10-CM

## 2024-08-15 PROCEDURE — 97530 THERAPEUTIC ACTIVITIES: CPT | Mod: GP,KX

## 2024-08-15 PROCEDURE — 97110 THERAPEUTIC EXERCISES: CPT | Mod: GP,KX

## 2024-08-15 NOTE — PROGRESS NOTES
"Physical Therapy Visit    PT DAILY NOTE FOR OUTPATIENT THERAPY    Patient: Jose Fuentes MRN: [id number]  : 1932 89 y.o.  Referring Physician: Sj Chan MD  Date of Visit: 8/15/2024    Certification Dates: 24 through 10/01/24    Diagnosis:   1. Displaced fracture of greater trochanter of right femur, initial encounter for closed fracture (CMS/[facility])    2. Difficulty in walking        Chief Complaints:  decreased ROM, strength, balance, and gait deviations.    Precautions:   Existing Precautions/Restrictions: cardiac  Precautions comments: CAD, tachycardia      TODAY'S VISIT    Time In Session:  Start Time: 1500  Stop Time: 1600  Time Calculation (min): 60 min   History/Vitals/Pain/Encounter Info - 08/15/24 1456          Injury History/Precautions/Daily Required Info    Document Type daily treatment     Primary Therapist Christine Mulvihill, PT     Chief Complaint/Reason for Visit  decreased ROM, strength, balance, and gait deviations.     Onset of Illness/Injury or Date of Surgery 24     Referring Physician Dr. Sj Chan     Existing Precautions/Restrictions cardiac     Precautions comments CAD, tachycardia     History of present illness/functional impairment Jose Fuentes is a 89 year old male who reports approximately 2 weeks ago he woke up during the middle of the night and the bed was spinning. He reports his daughter came and \"recalibrated his crystals\" by putting his head back and the vertigo resolved. The patient reports he had vertigo again the next evening with less intensity and resolved without treatment. He reports he had dizziness during the night 2 days ago. He reports he has dizziness when he lies on his back or rolls to either side however it occurs inconsistently. The patient denies a history of vertigo. He is currently sleeping with his head elevated. He sustained a R hip fracture on 24 from a fall and is currently receiving ortho treatment in PT at " "[facility]. He is using a rollator walker.     Patient/Family/Caregiver Comments/Observations Pt reports no lightheadedness or symptoms presents from last session.     Patient reported fall since last visit No        Pain Assessment    Currently in pain No/Denies        Pre Activity Vital Signs    /78     BP Location Right upper arm     BP Method Manual     Patient Position Sitting                    Daily Treatment Assessment and Plan - 08/15/24 1456          Daily Treatment Assessment and Plan    Progress toward goals Progressing     Daily Outcome Summary Pt arrrived feeling good with better tolerance to exercise and vital signs WNL. Re-added all strengthening exercises and progressed NuStep for strength and endurance and muscle fatigue indicated appropriate challenge. Will benefit from continued strengthening progressions as tolerated.     Plan and Recommendations Continue to progress as tolerated, monitoring BP.                         OBJECTIVE DATA TAKEN TODAY:    None taken    Today's Treatment:        ORTHO PT FLOWSHEET    Y/N Exercises Current Session Time    MODALITIES- HEAT/ICE  CPT 32798 TOTAL TIME FOR SESSION Not performed    Heat     Ice/Vasocompression     Mechanical Traction     THER ACT  CPT 02036 TOTAL TIME FOR SESSION 10 minutes    Pt Education Pt educated re: POC, objective findings, importance of attendance and performance of HEP.  7/29/24 pt and pts daughter educated re:  PN results and plan for continued PT.  If vestibular PT indicated and cause of balance deficits and gait dysfunction will discuss need for ortho and vestibular PT simultaneously.    Transfer training    y        n      y Pain, falls, meds and vitals Monitored pre rx, and post visit   7/25/24 - performed numerous vitals throughout treatment due to lower than normal reading upon arrival.  Also performed orthostatic readings as per flow sheet  8/5/24 - vitals, pt slightly \"off\" per daughter coming from OT, she noted usually his " blood sugar.  Vitals wnls and after eating tho, pt returned to normal with light easy exercises.  8/8/24 Vitals still a little low, increased after NUSTEP and standing exercises. Discussed stressors in his life that may be contributing to his feeling off, such as sleep disturbances etc.     THER EX  CPT 16832 TOTAL TIME FOR SESSION 50 minutes    CARDIOVASCULAR      HEP INSTRUCTION Instructed in primary HEP consisting of bridtes, bkfo, quad sets, glut sets.  Given exercise pro sheet and emailed to pts daughter.  See copy  below flow sheet  8/5/24 - added standing hip flex/ext/abd, heel raises,  to HEP see exercise pro sheet.     y NUSTEP seat #11 arms #12 Level 4 x 8 minutes [facility]    [employer]     Elliptical     Treadmill     STRENGTHENING      y  y  y  y  y  y  y    y Supine ther-ex  Quad sets  Glut sets  Ball squeezes  Clamshells  SKFO   Marches  Bridges  SAQ  SLR     1 x 10 5 sec  1 x 10 5 sec  2 x 10 3 sec  3 x 10 3 sec Pink band  3 x 10 3 sec Pink band  3 x 10 3 sec Pink band  3 x 10 3 sec Pink band  2 x 10 3 sec 1# B    2 x 10 2# B      n Seated ther-ex   Sit to stand   2 x 10     y  y  y  y    y Standing ther-ex  HR/TR  Hip flex  Hip ext  Hip abd  Mini squats  Heel raises   2 x 10           NO WEIGHT TODAY  2 x 10  1#  2 x 10  1#  2 x 10  1#    2 x 10 1#    STRETCHING        n   LE stretching  HS strap stretch  SAUMYA  Frog stretch   3 x 30 seconds  Held SAUMYA due to reports of pain with self stretch  3 x 30 sec    Other stretching     REPEATED MOVEMENTS     NEURO RE-ED  CPT 19201 TOTAL TIME FOR SESSION Not Performed    COORDINATION     POSTURAL RE-ED        PRE-GAIT ACTIVITIES      BALANCE TRAINING      Sitting balance        y  y  y  y Standing balance  FOAM marching  SLS  Full tandem stance  Forward and backward walking  Side stepping    20 x  3 x 30 sec B  3 x 30 sec B  6 x Patient added a dance step with good balance  6 x Patient added a dance step with good balance    GAIT  TRAINING  CPT 14907 TOTAL TIME FOR SESSION Not performed     n  nv Ambulation   Even terrain with SPC and no AD Uneven terrain w/SPC   Walked from ortho gym to neuro center stairs and back CS    Dynamic gait     n Stair negotiation 1 flight of steps with B railings and cgx1    Curb negotiation     Ramp negotiation     Outdoor ambulation     BWS/vector training     MANUAL  CPT 91905 TOTAL TIME FOR SESSION Not performed    Stretching 7/25/24 - Attempted gentle HS and gentle SAUMYA stretch, held due to pain    Mobilization      Massage     Taping      7/9/24

## 2024-08-15 NOTE — OP PT TREATMENT LOG
"    ORTHO PT FLOWSHEET    Y/N Exercises Current Session Time    MODALITIES- HEAT/ICE  CPT 35800 TOTAL TIME FOR SESSION Not performed    Heat     Ice/Vasocompression     Mechanical Traction     THER ACT  CPT 38077 TOTAL TIME FOR SESSION 10 minutes    Pt Education Pt educated re: POC, objective findings, importance of attendance and performance of HEP.  7/29/24 pt and pts daughter educated re:  PN results and plan for continued PT.  If vestibular PT indicated and cause of balance deficits and gait dysfunction will discuss need for ortho and vestibular PT simultaneously.    Transfer training    y        n      y Pain, falls, meds and vitals Monitored pre rx, and post visit   7/25/24 - performed numerous vitals throughout treatment due to lower than normal reading upon arrival.  Also performed orthostatic readings as per flow sheet  8/5/24 - vitals, pt slightly \"off\" per daughter coming from OT, she noted usually his blood sugar.  Vitals wnls and after eating tho, pt returned to normal with light easy exercises.  8/8/24 Vitals still a little low, increased after NUSTEP and standing exercises. Discussed stressors in his life that may be contributing to his feeling off, such as sleep disturbances etc.     THER EX  CPT 84234 TOTAL TIME FOR SESSION 50 minutes    CARDIOVASCULAR      HEP INSTRUCTION Instructed in primary HEP consisting of bridtes, bkfo, quad sets, glut sets.  Given exercise pro sheet and emailed to pts daughter.  See copy  below flow sheet  8/5/24 - added standing hip flex/ext/abd, heel raises,  to HEP see exercise pro sheet.     y NUSTEP seat #11 arms #12 Level 4 x 8 minutes [facility]    Stationary Bike     Elliptical     Treadmill     STRENGTHENING      y  y  y  y  y  y  y    y Supine ther-ex  Quad sets  Glut sets  Ball squeezes  Clamshells  SKFO   Marches  Bridges  SAQ  SLR     1 x 10 5 sec  1 x 10 5 sec  2 x 10 3 sec  3 x 10 3 sec Pink band  3 x 10 3 sec Pink band  3 x 10 3 sec Pink band  3 x " 10 3 sec Pink band  2 x 10 3 sec 1# B    2 x 10 2# B      n Seated ther-ex   Sit to stand   2 x 10     y  y  y  y    y Standing ther-ex  HR/TR  Hip flex  Hip ext  Hip abd  Mini squats  Heel raises   2 x 10           NO WEIGHT TODAY  2 x 10  1#  2 x 10  1#  2 x 10  1#    2 x 10 1#    STRETCHING        n   LE stretching  HS strap stretch  SAUMYA  Frog stretch   3 x 30 seconds  Held SAUMYA due to reports of pain with self stretch  3 x 30 sec    Other stretching     REPEATED MOVEMENTS     NEURO RE-ED  CPT 69887 TOTAL TIME FOR SESSION Not Performed    COORDINATION     POSTURAL RE-ED        PRE-GAIT ACTIVITIES      BALANCE TRAINING      Sitting balance        y  y  y  y Standing balance  FOAM marching  SLS  Full tandem stance  Forward and backward walking  Side stepping    20 x  3 x 30 sec B  3 x 30 sec B  6 x Patient added a dance step with good balance  6 x Patient added a dance step with good balance    GAIT TRAINING  CPT 65910 TOTAL TIME FOR SESSION Not performed     n  nv Ambulation   Even terrain with SPC and no AD Uneven terrain w/SPC   Walked from ortho gym to neuro center stairs and back CS    Dynamic gait     n Stair negotiation 1 flight of steps with B railings and cgx1    Curb negotiation     Ramp negotiation     Outdoor ambulation     BWS/vector training     MANUAL  CPT 35413 TOTAL TIME FOR SESSION Not performed    Stretching 7/25/24 - Attempted gentle HS and gentle SAUMYA stretch, held due to pain    Mobilization      Massage     Taping      7/9/24

## 2024-08-22 ENCOUNTER — HOSPITAL ENCOUNTER (OUTPATIENT)
Dept: OCCUPATIONAL THERAPY | Facility: REHABILITATION | Age: 89
Setting detail: THERAPIES SERIES
Discharge: HOME | End: 2024-08-22
Attending: INTERNAL MEDICINE
Payer: MEDICARE

## 2024-08-22 DIAGNOSIS — Z78.9 IMPAIRED INSTRUMENTAL ACTIVITIES OF DAILY LIVING (IADL): ICD-10-CM

## 2024-08-22 DIAGNOSIS — Z78.9 DECREASED ACTIVITIES OF DAILY LIVING (ADL): Primary | ICD-10-CM

## 2024-08-22 PROCEDURE — 97112 NEUROMUSCULAR REEDUCATION: CPT | Mod: GO

## 2024-08-22 PROCEDURE — 97530 THERAPEUTIC ACTIVITIES: CPT | Mod: GO

## 2024-08-22 NOTE — PROGRESS NOTES
Occupational Therapy Visit    OT DAILY NOTE FOR OUTPATIENT THERAPY    Patient: Jose Fuentes   MRN: [id number]  : 1932 89 y.o.  Referring Physician: Sj Chan MD  Date of Visit: 2024      Certification Dates: 24 through 24    Diagnosis:   1. Decreased activities of daily living (ADL)    2. Impaired instrumental activities of daily living (IADL)        Chief Complaints:   ADL dysfunction and return to driving goals s/p fall with right hip fracture    Precautions:  Existing Precautions/Restrictions: fall    TODAY'S VISIT    Time In Session:  Start Time: 1404  Stop Time: 1459  Time Calculation (min): 55 min   History/Vitals/Pain/Encounter Info - 24 1405          Injury History/Precautions/Daily Required Info    Document Type daily treatment     Primary Therapist Kim Bryant MSOTR/L     Chief Complaint/Reason for Visit  ADL dysfunction and return to driving goals s/p fall with right hip fracture     Onset of Illness/Injury or Date of Surgery 24     Referring Physician Grace Stroud MD     Existing Precautions/Restrictions fall     History of present illness/functional impairment Jose Fuentes is a 89-year-old male (patient of Dr. Stroud) being seen for OT evaluation today s/p hospitalization at [facility] (24-24) after a mechanical fall at home with resulting closed fracture of greater trochanter of right femur. He has a PMH significant for HTN, HL, CAD, OA, a-fib. Patient admitted to the hospital for a period of possible unresponsiveness.  He was reportedly difficult to awaken by his wife, but by his reports he was very tired and was in a deep sleep.  He awoke prior to arrival.  He had also been having hip pain after a fall he sustained while trying to prevent his wife from falling.  He was noted to have right greater trochanteric fracture.  Seen by orthopedics, determined that he would respond with nonoperative management. He followed up with ortho  (Dr. Chan) on 6/10/24. Of note, he was admitted to SNF on 5/13/24 and signed himself out AMA one day later. He was evaluated by homecare OT but did not qualify for services.     Patient/Family/Caregiver Comments/Observations Pt received in waiting area accompanied by his daughter. Pt sharing feels his gait and balance are impaired and wishes to continue to work on in sessions.     Patient reported fall since last visit No        Pain Assessment    Currently in pain No/Denies        Pre Activity Vital Signs    Pulse 71     SpO2 92 %     Oxygen Therapy None (Room air)     /60     BP Location Right upper arm     BP Method Manual     Patient Position Sitting                    Daily Treatment Assessment and Plan - 08/22/24 1405          Daily Treatment Assessment and Plan    Progress toward goals Progressing     Daily Outcome Summary Session focused on standing tolerance, balance, and strengthening NMRE. Pt sharing main goals for continued therapy are to improve LE mobility and strength. Pt shares he does not plan to return to driving within the next week and did seek additional guidance for use of adaptive equipment including transition from rollator to SPT. Reviewed with pt he will continue with OP PT services for continued focus on improving pt balance and AD recommendation. Pt tolerated OT session well with targeted UE strengthening and functional reaching during dynamic balance. Reviewed plan for discharge next session. Pt/daughter verbalize agreement with discharge plan.     Plan and Recommendations pt would benefit from continuation in the skilled outpatient OT POC in place, continuing to address his functional balance, endurance and strength and coordination deficits in order to improve his independence in higher level iADLs and leisure pursuits (travel and hobbies) to improve quality of life and reduce risk of falls and additional injury. anticipated discharge fron outpatient OT on 8/26/24 with primary  OT                         OBJECTIVE DATA TAKEN TODAY    Vision     Vision - 08/22/24 1457          Visual Reaction Timing    Dynavision B 5 sec: 42 targets, 1.40 seconds   Second attempt: 43 targets, 1.40 seconds     50/50 red/green 5 sec   Red R UE only: 18/18 targets, 1.62 second  Green L UE only: 20/20 targets, 1.46 seconds     Outer 2 rings, peripheral   40/40 targets, 1.46 seconds     B 5 sec   52 targets, 1.13 seconds     Dynavision Score (Mode B, 5 sec light timer) Targets 52     Dynavision Score Avg response time 1.13 Seconds     Dynavision Score Overall Impaired                     Today's Treatment:    OT NEURO FLOW SHEET    EXERCISES CURRENT SESSION TIME   NEURO RE-ED  CPT 92625 TOTAL TIME FOR SESSION 40 minutes   AAROM/AROM     Strengthening +standing tolerance/balance     Activity 1:  Standing over AIREX foam: pt performed functional reaching to reach for squigz placed onto mirror ahead. Encouraged alternating R/L UE for reaching. Pt performed with CLS and gait belt donned during second attempt for balance and safety. 3 lb weights donned onto pt bilateral wrist for additional strengthening challenge. Pt performed activity x 3 to retrieve 10-15 total squigz from mirror.     Activity 2:   Standing over AIREX foam pt performed with 3 lb weights donned onto bilateral wrists. Pt performed chest press 10 x 2 and over head press 10 x 2 with 3 lb weighted dowel patti. Graded to performed standing over AIREX foam 10 x for ball tap with use of dowel patti. Pt performed 10 x with report of increased wobbly feeling throughout. Graded to perform additional dowel patti taps into floor 10 x 2.      Strength     BITs     Gross Motor Control     Fine Motor Control     Sitting Margareth/Balance     Standing Margareth/Balance     Cognition      Retraining Dynavision   B 5 sec: 42 targets, 1.40 seconds   Second attempt: 43 targets, 1.40 seconds     50/50 red/green 5 sec   Red R UE only: 18/18 targets, 1.62 second  Green L UE only:  "20/20 targets, 1.46 seconds     Outer 2 rings, peripheral   40/40 targets, 1.46 seconds     B 5 sec   52 targets, 1.13 seconds    THERE ACT  CPT 02658 TOTAL TIME FOR SESSION 8-22 Minutes   Assessment     Pain, Vitals, Meds, Etc. Assessed and monitored. Reviewed with pt/daughter plan for pt to continue with OP PT services to address LE concerns regarding balance. Pt sharing balance concern including with his gait/balance. Reviewed plan for discharge from OP OT services next session with primary OT.       Hx:   Discussed with pt anticipated discharge scheduled for the end of his POC with small exception of ending one session early on 8/26 with primary OT. Pt and daughter in agreement. Pt also will likely change appts to not have OT/PT back to back, as had been physically overexerting thus far. Pt's daughter provided with print out of schedule to adjust. Pt will be leaving for vacation to [place] on 8/29/24. Discussed ongoing recommendation re: use of grab bar in shower with standing level ADLs. Pt in agreement and will be ordering an adhesive bar system    Hx and ongoing:  Pts daughter present for OT session today, discussed upcoming progress note anticipated for next visit, after which determination of ongoing skilled need to be discussed. At this time, likely for pt to continue throughout OT POC in place and d/c on/around 9/5/24. Pt and daughter in agreement. Daughter hands off pt's ongoing and increasing independence in basic and iADLs but also some unsafe behaviors (ie: weeding without assistance or supervision). Discussed use of rollator to perform tasks in sitting with good impact and demonstration. Lastly discussed ongoing pt goals to return to driving. Pt in general performs well on the driving simulator, however does have difficulty managing the \"virtual reality/simulation\" aspects. Since pt with valid license in place - discussed benefit of trial of driving with supervision in open area (unused school parking " lot etc). To trial return to driving in safest way. Pt and daughter in agreement.    Pt also presents with novel goals to continue to address in OT sessions including: fall recovery, getting into/out of cross-legged position, use of Jacuzzi tub. Above also handed off to PT for carryover    HEP Hx:   Simple reed UE HEP provided to pt addressing functional balance with emphasis on weight shift and reaching out of base of support as below        Hx:  Dicussed integration of above gross motor and standing balance activities into home HEP with safety precautions in place (supervision vs standing therex in front of secure countertop). Pt and daughter eager to incorporate racquet and unilateral passing therex.    Functional Mobility CLS with rollator throughout hospital setting, use of SAC throughout therapy gym to and from UBE    Transfers     THERE EX  CPT 19900 TOTAL TIME FOR SESSION Not performed   PROM     Activity Tolerance     SELF-CARE  CPT 34775 TOTAL TIME FOR SESSION Not performed   Pt Education/Safety     ADL Training     IADL Training     MODALITIES  CPT 89070 TOTAL TIME FOR SESSION Not performed   Ice/Heat     ATTENDED E-STIM  CPT 04740 TOTAL TIME FOR SESSION Not performed   Attended E-Stim     SPLINTING  CPT 79285 TOTAL TIME FOR SESSION Not performed   Fabrication/Check Out     Fit     Training     GROUP  CPT 00859 TOTAL TIME FOR SESSION Not performed

## 2024-08-22 NOTE — OP OT TREATMENT LOG
OT NEURO FLOW SHEET    EXERCISES CURRENT SESSION TIME   NEURO RE-ED  CPT 31084 TOTAL TIME FOR SESSION 40 minutes   AAROM/AROM     Strengthening +standing tolerance/balance     Activity 1:  Standing over AIREX foam: pt performed functional reaching to reach for squigz placed onto mirror ahead. Encouraged alternating R/L UE for reaching. Pt performed with CLS and gait belt donned during second attempt for balance and safety. 3 lb weights donned onto pt bilateral wrist for additional strengthening challenge. Pt performed activity x 3 to retrieve 10-15 total squigz from mirror.     Activity 2:   Standing over AIREX foam pt performed with 3 lb weights donned onto bilateral wrists. Pt performed chest press 10 x 2 and over head press 10 x 2 with 3 lb weighted dowel patti. Graded to performed standing over AIREX foam 10 x for ball tap with use of dowel patti. Pt performed 10 x with report of increased wobbly feeling throughout. Graded to perform additional dowel patti taps into floor 10 x 2.      Strength     BITs     Gross Motor Control     Fine Motor Control     Sitting Margareth/Balance     Standing Margareth/Balance     Cognition      Retraining Dynavision   B 5 sec: 42 targets, 1.40 seconds   Second attempt: 43 targets, 1.40 seconds     50/50 red/green 5 sec   Red R UE only: 18/18 targets, 1.62 second  Green L UE only: 20/20 targets, 1.46 seconds     Outer 2 rings, peripheral   40/40 targets, 1.46 seconds     B 5 sec   52 targets, 1.13 seconds    THERE ACT  CPT 67020 TOTAL TIME FOR SESSION 8-22 Minutes   Assessment     Pain, Vitals, Meds, Etc. Assessed and monitored. Reviewed with pt/daughter plan for pt to continue with OP PT services to address LE concerns regarding balance. Pt sharing balance concern including with his gait/balance. Reviewed plan for discharge from OP OT services next session with primary OT.       Hx:   Discussed with pt anticipated discharge scheduled for the end of his POC with small exception of ending one  "session early on 8/26 with primary OT. Pt and daughter in agreement. Pt also will likely change appts to not have OT/PT back to back, as had been physically overexerting thus far. Pt's daughter provided with print out of schedule to adjust. Pt will be leaving for vacation to [place] on 8/29/24. Discussed ongoing recommendation re: use of grab bar in shower with standing level ADLs. Pt in agreement and will be ordering an adhesive bar system    Hx and ongoing:  Pts daughter present for OT session today, discussed upcoming progress note anticipated for next visit, after which determination of ongoing skilled need to be discussed. At this time, likely for pt to continue throughout OT POC in place and d/c on/around 9/5/24. Pt and daughter in agreement. Daughter hands off pt's ongoing and increasing independence in basic and iADLs but also some unsafe behaviors (ie: weeding without assistance or supervision). Discussed use of rollator to perform tasks in sitting with good impact and demonstration. Lastly discussed ongoing pt goals to return to driving. Pt in general performs well on the driving simulator, however does have difficulty managing the \"virtual reality/simulation\" aspects. Since pt with valid license in place - discussed benefit of trial of driving with supervision in open area (unused school parking lot etc). To trial return to driving in safest way. Pt and daughter in agreement.    Pt also presents with novel goals to continue to address in OT sessions including: fall recovery, getting into/out of cross-legged position, use of Jacuzzi tub. Above also handed off to PT for carryover    HEP Hx:   Simple reed UE HEP provided to pt addressing functional balance with emphasis on weight shift and reaching out of base of support as below        Hx:  Dicussed integration of above gross motor and standing balance activities into home HEP with safety precautions in place (supervision vs standing therex in front of secure " countertop). Pt and daughter eager to incorporate racquet and unilateral passing therex.    Functional Mobility CLS with rollator throughout hospital setting, use of SAC throughout therapy gym to and from UBE    Transfers     THERE EX  CPT 34735 TOTAL TIME FOR SESSION Not performed   PROM     Activity Tolerance     SELF-CARE  CPT 19342 TOTAL TIME FOR SESSION Not performed   Pt Education/Safety     ADL Training     IADL Training     MODALITIES  CPT 02489 TOTAL TIME FOR SESSION Not performed   Ice/Heat     ATTENDED E-STIM  CPT 29760 TOTAL TIME FOR SESSION Not performed   Attended E-Stim     SPLINTING  CPT 74917 TOTAL TIME FOR SESSION Not performed   Fabrication/Check Out     Fit     Training     GROUP  CPT 15403 TOTAL TIME FOR SESSION Not performed

## 2024-08-26 ENCOUNTER — HOSPITAL ENCOUNTER (OUTPATIENT)
Dept: OCCUPATIONAL THERAPY | Facility: REHABILITATION | Age: 89
Setting detail: THERAPIES SERIES
Discharge: HOME | End: 2024-08-26
Attending: INTERNAL MEDICINE
Payer: MEDICARE

## 2024-08-26 DIAGNOSIS — Z78.9 IMPAIRED INSTRUMENTAL ACTIVITIES OF DAILY LIVING (IADL): ICD-10-CM

## 2024-08-26 DIAGNOSIS — Z78.9 DECREASED ACTIVITIES OF DAILY LIVING (ADL): Primary | ICD-10-CM

## 2024-08-26 PROCEDURE — 97112 NEUROMUSCULAR REEDUCATION: CPT | Mod: GO

## 2024-08-26 PROCEDURE — 97530 THERAPEUTIC ACTIVITIES: CPT | Mod: GO

## 2024-08-26 ASSESSMENT — 9 HOLE PEG TEST: TESTTIME_SECONDS: 30

## 2024-08-26 NOTE — PROGRESS NOTES
Occupational Therapy Discharge      OT DISCHARGE NOTE FOR OUTPATIENT THERAPY    Patient: Jose Fuentes   MRN: [id number]  : 1932 89 y.o.  Referring Physician: Sj Chan MD  Date of Visit: 2024      Certification Dates:  24 through 24    Total Visit Count: 12    Diagnosis:   1. Decreased activities of daily living (ADL)    2. Impaired instrumental activities of daily living (IADL)        Chief Complaints:   Chief Complaint   Patient presents with    Decreased Endurance    Dec Strength    Decreased recreational/play activity       Precautions:   Existing Precautions/Restrictions: fall    TODAY'S VISIT:    Time In Session:  Start Time: 1402  Stop Time: 1455  Time Calculation (min): 53 min   General Information - 24 1400          Session Details    Document Type discharge evaluation     Mode of Treatment individual therapy        General Information    Onset of Illness/Injury or Date of Surgery 24     Referring Physician Grace Stroud MD     History of present illness/functional impairment Jose Fuentes is a 89-year-old male (patient of Dr. Stroud) being seen for OT evaluation today s/p hospitalization at [facility] (24-24) after a mechanical fall at home with resulting closed fracture of greater trochanter of right femur. He has a PMH significant for HTN, HL, CAD, OA, a-fib. Patient admitted to the hospital for a period of possible unresponsiveness.  He was reportedly difficult to awaken by his wife, but by his reports he was very tired and was in a deep sleep.  He awoke prior to arrival.  He had also been having hip pain after a fall he sustained while trying to prevent his wife from falling.  He was noted to have right greater trochanteric fracture.  Seen by orthopedics, determined that he would respond with nonoperative management. He followed up with ortho (Dr. Chan) on 6/10/24. Of note, he was admitted to SNF on 24 and signed himself out AMA  one day later. He was evaluated by homecare OT but did not qualify for services.     Patient/Family/Caregiver Comments/Observations pt recevied in waiting room, accompanied by his daughter, agreeable to OT discharge evaluation as scheduled     Existing Precautions/Restrictions fall                      Pain/Vitals - 08/26/24 1400          Pain Assessment    Currently in pain No/Denies        Pre Activity Vital Signs    Oxygen Therapy None (Room air)     /60                    OT - 08/26/24 1400          Occupational Therapy Encounter Type Details    Occupational Therapy Specialty Traditional Neuro Program OT        OT Frequency and Duration    Frequency of treatment 2 times/week     OT Duration 8 weeks     OT Cert From 07/09/24     OT Cert To 09/03/24     Date OT POC was sent to provider 07/09/24     Signed OT Plan of Care received?  Yes                    Assessment - 08/26/24 1411          Assessment    Plan of Care reviewed and patient/family in agreement Yes     Clinical Assessment Jose Fuentes is a 89-year-old male (patient of Dr. Stroud) being seen for outpatient OT services s/p hospitalization at [facility] (5/8/24-5/13/24) after a mechanical fall at home with resulting closed fracture of greater trochanter of right femur. He has a PMH significant for HTN, HL, CAD, OA, a-fib. Seen by orthopedics, determined that he would respond with nonoperative management. He followed up with ortho (Dr. Chan) on 6/10/24. Was originally partial weight bearing, now deemed WBAT. He presents today for his outpatient OT discharge evaluation on his 12th visit. Quantitative measures reassessed in the areas of gross and fine motor coordination, and divided attention.  Of note, pt had previously scored WNL bilaterally on  reassessment and with his right UE on 9 Hole Peg Test and therefore these were not reassessed today. With left hand reassessment on 9 Hole Peg, pt improved by 1.2 seconds, completing testing in 30  seconds today which puts pt WFL for participation bilaterally now. On Box and Blocks, he improved with right UE engagement, transferring 56 blocks/minute, which was a 7-block increase, although his left UE did not improve today. Lastly, on [place] Making Test, pt completed Part A 9 seconds faster today, completing in 30 seconds and error free, although his performance on Part B did not improve in time. However he was able to complete without errors today. PSFS also reassessed with self-report today of 54% function as compared to 14% upon initial evaluation. At this time, the pt has come to the end of his outpatient OT POC in place and has returned to engagement in all ADLs and iADLs at baseline level of function with addition of AE/DME as needed for safety. Additionally, at this time, the pt has met many age/gender norms as well as goals set. For these reasons, he is appropriate for discharge form outpatient OT today and is in agreement.     Plan and Recommendations discharge today from outpatient OT                     OBJECTIVE MEASUREMENTS/DATA:    Outcome Measures    Outcome Measures - 08/26/24 1411          Objective Outcome Measures    RIGHT hand: Box and Blocks Assessment 56 blocks (norm = 63 blocks) 7 block increase     LEFT hand: Box and Blocks Assessment 45 blocks (norm = 61 blocks) 3 block decrease     9 Hole Peg Test - RIGHT HAND TIME --   not reassessed WNL    9 Hole Peg Test - LEFT HAND TIME 30   1.2 second improvement now WFL    9 Hole Peg Test - COMMENTS (norms: R = 27.5 seconds, L = 29.5 seconds)     Trail Making Part A - TIME 30   9 second improvement    Trail Making Part A - ERRORS 0     Trail Making Part B - TIME 122   2 minutes 2 seconds    Trail Making Part B - ERRORS 0        NEW (2/6/23):  PSFS     PSFS ACTIVITY 1 retun to leisure activity of reading     PSFS ANSWER 1 3   finished one book, started another    PSFS ACTIVITY 2 return leisure activity of model building     PSFS ANSWER 2 9    repairing his [employer] chicken, and walking toy for his great grandson    PSFS ACTIVITY 3 balance     PSFS ANSWER 3 5     PSFS ACTIVITY 4 return to driving     PSFS ANSWER 4 5     PSFS ACTIVITY 5 gardening     PSFS ANSWER 5 5     PSFS Sum of Activity Scores 27     PSFS Number of Activities 5     PSFS Percent Score 54 %                       Outcome Measures          7/9/2024    16:07 7/18/2024    17:10 7/23/2024    15:36 8/8/2024    11:10 8/22/2024    14:57 8/26/2024    14:11   OT OBJECTIVE Outcome Measures   9 Hole Peg Test - Right Hand  33  27.95       6 seconds faster, now WFL  --        not reassessed WNL   9 Hole Peg Test - Left Hand  34  31.2       Trial 1: 36 seconds, Trial 2: 31 seconds (3 second increase)  30       1.2 second improvement now WFL   9 Hole Peg Test - Comments to be assessed (norms: R = 27.5 seconds, L = 29.5 seconds)   to be assessed (norms: R = 27.5 seconds, L = 29.5 seconds)  (norms: R = 27.5 seconds, L = 29.5 seconds)   Right Hand Box and Blocks 53 blocks (norm = 63 blocks)   49 blocks (norm = 63 blocks) 4 block decrease       Trial 1: 49 blocks Trial 2: 54 blocks  56 blocks (norm = 63 blocks) 7 block increase   Left Hand Box and Blocks 46 blocks (norm = 61 blocks)   48 blocks (norm = 61 blocks) 2 block increase  45 blocks (norm = 61 blocks) 3 block decrease   [place] Total Time   55.94 Seconds      [place] Total Errors   0      Dynavision - Targets  49 52 52 52    Dynavision - Seconds  1.22 Seconds 1.13 Seconds 1.15 Seconds 1.13 Seconds    Dynavision - Impression   Impaired Impaired Impaired    Trail Making Part A - Time 44   39       5 second improvement  30       9 second improvement   Trail Making Part A - Errors 1       (self-corrected haphazard error)   0       1 self-corrected error immediately  0   Trail Making Part B - Time 87       Trial 1: terminated at 48 seconds with multiple errors (first error omitted 3). Trial 2: pt counted aloud with increased accuracy throughout.    68  122       2 minutes 2 seconds   Trail Making Part B - Errors 0   2  0   OT SUBJECTIVE Outcome Measures   PSFS % Score  14 %  32 %  54 %       Today's Treatment:     Education provided:  Yes: See treatment log for details of education provided    OT NEURO FLOW SHEET    EXERCISES CURRENT SESSION TIME   NEURO RE-ED  CPT 70829 TOTAL TIME FOR SESSION 37 minutes   AAROM/AROM     Strengthening +standing tolerance/balance     UBE SCIFIT performed in stance addressing pt's functional standing tolerance, endurance dynamic balance and weight shifting throughout reed LE. Completed on upgraded level 3 for a total of 8 minutes, completing 0.9 miles total distance. Pt alternated direction of propulsion every 2 minutes throughout      Strength     BITs     Gross Motor Control Box and Blocks reassessed for discharge evaluation, see assessments section for details.     Fine Motor Control Left UE 9 Hole Peg Test reassessed for discharge evaluation, see assessments section for details.     Sitting [place]/Balance     Standing Marg[place]/Balance     Cognition Trail Making Test reassessed for discharge evaluation, see assessments section for details.      Retraining Dynavision reassessed in prior treatment session.    Hx:  Mode B 5 sec  52 targets, 1.13 seconds    THERE ACT  CPT 38521 TOTAL TIME FOR SESSION 8-22 Minutes  20 minutes   Assessment PSFS reassessed for discharge evaluation, see assessments section for details.     Pain, Vitals, Meds, Etc. Assessed and monitored. Pt presents today accommodated by daughter, agreeable to OT discharge evaluation as scheduled. Reviewed HEP in place to pts comfort. Encouraged ongoing participation in all basic and instrumental ADLs as able with supervision for more challenging tasks (ie, yardwork) pt and daughter in agreement.    Hx:   Discussed with pt anticipated discharge scheduled for the end of his POC with small exception of ending one session early on 8/26 with primary OT. Pt and daughter in  "agreement. Pt also will likely change appts to not have OT/PT back to back, as had been physically overexerting thus far. Pt's daughter provided with print out of schedule to adjust. Pt will be leaving for vacation to [place] on 8/29/24. Discussed ongoing recommendation re: use of grab bar in shower with standing level ADLs. Pt in agreement and will be ordering an adhesive bar system    Hx and ongoing:  Pts daughter present for OT session today, discussed upcoming progress note anticipated for next visit, after which determination of ongoing skilled need to be discussed. At this time, likely for pt to continue throughout OT POC in place and d/c on/around 9/5/24. Pt and daughter in agreement. Daughter hands off pt's ongoing and increasing independence in basic and iADLs but also some unsafe behaviors (ie: weeding without assistance or supervision). Discussed use of rollator to perform tasks in sitting with good impact and demonstration. Lastly discussed ongoing pt goals to return to driving. Pt in general performs well on the driving simulator, however does have difficulty managing the \"virtual reality/simulation\" aspects. Since pt with valid license in place - discussed benefit of trial of driving with supervision in open area (unused school parking lot etc). To trial return to driving in safest way. Pt and daughter in agreement.    Pt also presents with novel goals to continue to address in OT sessions including: fall recovery, getting into/out of cross-legged position, use of Jacuzzi tub. Above also handed off to PT for carryover    HEP Hx:   Simple reed UE HEP provided to pt addressing functional balance with emphasis on weight shift and reaching out of base of support as below        Hx:  Dicussed integration of above gross motor and standing balance activities into home HEP with safety precautions in place (supervision vs standing therex in front of secure countertop). Pt and daughter eager to incorporate racquet " and unilateral passing therex.    Functional Mobility CLS with rollator throughout hospital setting, use of SAC throughout therapy gym to and from UBE    Transfers     THERE EX  CPT 68279 TOTAL TIME FOR SESSION Not performed   PROM     Activity Tolerance     SELF-CARE  CPT 30510 TOTAL TIME FOR SESSION Not performed   Pt Education/Safety     ADL Training     IADL Training     MODALITIES  CPT 55264 TOTAL TIME FOR SESSION Not performed   Ice/Heat     ATTENDED E-STIM  CPT 65571 TOTAL TIME FOR SESSION Not performed   Attended E-Stim     SPLINTING  CPT 76931 TOTAL TIME FOR SESSION Not performed   Fabrication/Check Out     Fit     Training     GROUP  CPT 66894 TOTAL TIME FOR SESSION Not performed              Goals Addressed                      This Visit's Progress      COMPLETED: OT Goals: STG/LTG         Short Term Goals Time Frame Result Comment/Progress   Continue ongoing assessment of MoCA, Dynavision, 9 Hole Peg Test and PSFS and set goals as appropriate 1-2 weeks Met    Improve right UE hand strength as measured by 3-5 lb improvement in  to maximize UE integration.  4 weeks Met 7/9/24  L: 62.4 lbs  R: 60.5lbs    8/8/24  L: 65lbs WNL  R: 66lbs WNL   Improve reed UE gross motor coordination scores via Box and Blocks assessment by 3-5 blocks to maximize functional capacities and functional midline reaching patterns.  4 weeks Partially met, R 7/9/24  R: 53 blocks  L: 46 blocks    8/8/24  R: 49 blocks  L: 48 blocks    8/26/24  R: 56 blocks  L: 45 blocks   Decrease reed UE 9 Hole Peg Test time by 2 seconds to maximize functional capabilities during fine motor tasks.  4 weeks Met Baseline  R: 33 seconds  L: 34 seconds    8/8/24  R: 27 seconds  L: 31 seconds   Pt will engage in basic driving simulator scenarios targeting pedal and steering reaction time and FOD for determination of risk with return to driving.   4 weeks Met    Pt will score WFL for 50% of predriving assessments: TMT, MoCA, Dynavision, pedal/steering  reaction time, [place] Maze  4 weeks Met MoCA WNL   Improve PSFS scores by 10%, reflecting subjective improvement in functional independence and efficiency 4 weeks Met Baseline 14%    8/8/24  32%   Improve readiness for return to IADL/community reintegration with improved reaction time via Dynavision B 5 sec assessment to </= 1.1 sec.  4 weeks Not met Baseline  49 targets, 1.22 ART    8/8/24  52 targets, 1.15ART   Pt will be independent with progressively monitored  home exercise program to achieve goal attainment.  4 weeks Met    Improve functional cognition for IADL/community reintegration participation via increase in [place] Making Test assessment by 5 seconds (A and B).  4 weeks Met (time) 7/9/24  A: 44 seconds, 1 error  B: 87 seconds, 0 errors    8/8/24  A: 39 seconds, 0 errors  B: 68 seconds, 2 errors      Long Term Goals Time Frame Result Comment/Progress   Improve right UE hand strength as measured by 5-7lb improvement in  to maximize UE integration.  8 weeks Met reed UE WNL 7/9/24  L: 62.4 lbs  R: 60.5lbs    8/8/24  L: 65lbs WNL  R: 66lbs WNL   Improve reed UE gross motor coordination scores via Box and Blocks assessment by 5-7 blocks to maximize functional capacities and functional midline reaching patterns.  8 weeks Partially met, R 7/9/24  R: 53 blocks  L: 46 blocks    8/8/24  R: 49 blocks  L: 48 blocks    8/26/24  R: 56 blocks  L: 45 blocks   Decrease reed UE 9 Hole Peg Test time by 4-6 seconds to maximize functional capabilities during fine motor tasks.  8 weeks Met Baseline  R: 33 seconds  L: 34 seconds    8/8/24  R: 27 seconds  L: 31 seconds    8/26/24  R: 27 seconds  L: 30 seconds   Pt will engage in advanced driving simulator scenarios targeting naturalistic and hazards levels for determination of risk with return to driving.   8 weeks Met    Pt will score WFL for 100% of predriving assessments: TMT, MoCA, Dynavision, pedal/steering reaction time, [place]aze  8 weeks Not met MoCA WNL  "  Improve PSFS scores by 25%, reflecting subjective improvement in functional independence and efficiency 8 weeks Met Baseline 14%    8/8/24  32%    8/26/24  54 %   Improve readiness for return to IADL/community reintegration with improved reaction time via Dynavision B 5 sec assessment to </= 1.0 sec.  8 weeks Not met Baseline  49 targets, 1.22 ART    8/8/24  52 targets, 1.15ART           COMPLETED: OT: patient stated goal (pt-stated)         \"I just want to be able to do what I need to do: get up and down the stairs into the basement and not have to use the walker\"               Discharge information for [facility]:    Reason for D/C: Goals met  Was care interrupted for medical reason?: No  Did the patient demonstrate increased independence: Yes  Demonstration of independence:: [place] in HEP, Increased functional activity, Increased functional independence  Has the patient returned to productive activity?: Yes  Increased production assessment:: Increased community independence, Return to household activities, Return to participation in hobbies/leisure activities  Skin integrity: Intact - No issues                  "

## 2024-08-26 NOTE — OP OT TREATMENT LOG
OT NEURO FLOW SHEET    EXERCISES CURRENT SESSION TIME   NEURO RE-ED  CPT 37835 TOTAL TIME FOR SESSION 37 minutes   AAROM/AROM     Strengthening +standing tolerance/balance     UBE SCIFIT performed in stance addressing pt's functional standing tolerance, endurance dynamic balance and weight shifting throughout reed LE. Completed on upgraded level 3 for a total of 8 minutes, completing 0.9 miles total distance. Pt alternated direction of propulsion every 2 minutes throughout      Strength     BITs     Gross Motor Control Box and Blocks reassessed for discharge evaluation, see assessments section for details.     Fine Motor Control Left UE 9 Hole Peg Test reassessed for discharge evaluation, see assessments section for details.     Sitting Margareth/Balance     Standing [place]/Balance     Cognition Trail Making Test reassessed for discharge evaluation, see assessments section for details.      Retraining Dynavision reassessed in prior treatment session.    Hx:  Mode B 5 sec  52 targets, 1.13 seconds    THERE ACT  CPT 59591 TOTAL TIME FOR SESSION 8-22 Minutes  20 minutes   Assessment PSFS reassessed for discharge evaluation, see assessments section for details.     Pain, Vitals, Meds, Etc. Assessed and monitored. Pt presents today accommodated by daughter, agreeable to OT discharge evaluation as scheduled. Reviewed HEP in place to pts comfort. Encouraged ongoing participation in all basic and instrumental ADLs as able with supervision for more challenging tasks (ie, yardwork) pt and daughter in agreement.    Hx:   Discussed with pt anticipated discharge scheduled for the end of his POC with small exception of ending one session early on 8/26 with primary OT. Pt and daughter in agreement. Pt also will likely change appts to not have OT/PT back to back, as had been physically overexerting thus far. Pt's daughter provided with print out of schedule to adjust. Pt will be leaving for vacation to [place] on 8/29/24. Discussed  "ongoing recommendation re: use of grab bar in shower with standing level ADLs. Pt in agreement and will be ordering an adhesive bar system    Hx and ongoing:  Pts daughter present for OT session today, discussed upcoming progress note anticipated for next visit, after which determination of ongoing skilled need to be discussed. At this time, likely for pt to continue throughout OT POC in place and d/c on/around 9/5/24. Pt and daughter in agreement. Daughter hands off pt's ongoing and increasing independence in basic and iADLs but also some unsafe behaviors (ie: weeding without assistance or supervision). Discussed use of rollator to perform tasks in sitting with good impact and demonstration. Lastly discussed ongoing pt goals to return to driving. Pt in general performs well on the driving simulator, however does have difficulty managing the \"virtual reality/simulation\" aspects. Since pt with valid license in place - discussed benefit of trial of driving with supervision in open area (unused school parking lot etc). To trial return to driving in safest way. Pt and daughter in agreement.    Pt also presents with novel goals to continue to address in OT sessions including: fall recovery, getting into/out of cross-legged position, use of Jacuzzi tub. Above also handed off to PT for carryover    HEP Hx:   Simple reed UE HEP provided to pt addressing functional balance with emphasis on weight shift and reaching out of base of support as below        Hx:  Dicussed integration of above gross motor and standing balance activities into home HEP with safety precautions in place (supervision vs standing therex in front of secure countertop). Pt and daughter eager to incorporate racquet and unilateral passing therex.    Functional Mobility CLS with rollator throughout hospital setting, use of SAC throughout therapy gym to and from UBE    Transfers     THERE EX  CPT 32706 TOTAL TIME FOR SESSION Not performed   PROM     Activity " Tolerance     SELF-CARE  CPT 81310 TOTAL TIME FOR SESSION Not performed   Pt Education/Safety     ADL Training     IADL Training     MODALITIES  CPT 84053 TOTAL TIME FOR SESSION Not performed   Ice/Heat     ATTENDED E-STIM  CPT 60353 TOTAL TIME FOR SESSION Not performed   Attended E-Stim     SPLINTING  CPT 94775 TOTAL TIME FOR SESSION Not performed   Fabrication/Check Out     Fit     Training     GROUP  CPT 76624 TOTAL TIME FOR SESSION Not performed

## 2024-09-04 ENCOUNTER — HOSPITAL ENCOUNTER (OUTPATIENT)
Dept: PHYSICAL THERAPY | Facility: REHABILITATION | Age: 89
Setting detail: THERAPIES SERIES
Discharge: HOME | End: 2024-09-04
Attending: SPECIALIST
Payer: MEDICARE

## 2024-09-04 DIAGNOSIS — R26.2 DIFFICULTY IN WALKING: ICD-10-CM

## 2024-09-04 DIAGNOSIS — S72.111A DISPLACED FRACTURE OF GREATER TROCHANTER OF RIGHT FEMUR, INITIAL ENCOUNTER FOR CLOSED FRACTURE (CMS/HCC): Primary | ICD-10-CM

## 2024-09-04 PROCEDURE — 97110 THERAPEUTIC EXERCISES: CPT | Mod: GP,CQ,KX

## 2024-09-04 NOTE — OP PT TREATMENT LOG
"ORTHO PT FLOWSHEET     Y/N Exercises Current Session Time     MODALITIES- HEAT/ICE  CPT 26274 TOTAL TIME FOR SESSION Not performed     Heat       Ice/Vasocompression       Mechanical Traction       THER ACT  CPT 14801 TOTAL TIME FOR SESSION Brief     Pt Education Pt educated re: POC, objective findings, importance of attendance and performance of HEP.  7/29/24 pt and pts daughter educated re:  PN results and plan for continued PT.  If vestibular PT indicated and cause of balance deficits and gait dysfunction will discuss need for ortho and vestibular PT simultaneously.     Transfer training     y           n            n Pain, falls, meds and vitals Monitored pre rx, and post visit   7/25/24 - performed numerous vitals throughout treatment due to lower than normal reading upon arrival.  Also performed orthostatic readings as per flow sheet  8/5/24 - vitals, pt slightly \"off\" per daughter coming from OT, she noted usually his blood sugar.  Vitals wnls and after eating tho, pt returned to normal with light easy exercises.  8/8/24 Vitals still a little low, increased after NUSTEP and standing exercises. Discussed stressors in his life that may be contributing to his feeling off, such as sleep disturbances etc.      THER EX  CPT 34289 TOTAL TIME FOR SESSION 50 minutes     CARDIOVASCULAR        HEP INSTRUCTION Instructed in primary HEP consisting of bridtes, bkfo, quad sets, glut sets.  Given exercise pro sheet and emailed to pts daughter.  See copy  below flow sheet  8/5/24 - added standing hip flex/ext/abd, heel raises,  to HEP see exercise pro sheet.     y NUSTEP seat #11 arms #12 Level 4 x 10 minutes [facility]     Stationary Bike       Elliptical       Treadmill       STRENGTHENING        y  y  y  y  y  y  y  y  y Supine ther-ex  Quad sets  Glut sets  Ball squeezes  Clamshells  SKFO   Marches  Bridges  SAQ  SLR       1 x 10 5 sec B   1 x 10 5 sec B  2 x 10 3 sec  3 x 10 3 sec green band  3 x 10 3 sec green " band  3 x 10 3 sec green band  3 x 10 3 sec green band  3 x 10 3 sec 1# B    3 x 10 1# B       n Seated ther-ex   Sit to stand    2 x 10      n  n  n  n      Standing ther-ex  Heel raises  Hip flex  Hip ext  Hip abd  Mini squats      2 x 10  1#       2 x 10  1#  2 x 10  1#  2 x 10  1#          STRETCHING           n    LE stretching  HS strap stretch  SAUMYA  Frog stretch    3 x 30 seconds  Held SAUMYA due to reports of pain with self stretch  3 x 30 sec     Other stretching       REPEATED MOVEMENTS       NEURO RE-ED  CPT 76609 TOTAL TIME FOR SESSION Not Performed     COORDINATION       POSTURAL RE-ED           PRE-GAIT ACTIVITIES        BALANCE TRAINING        Sitting balance           Re-add  Re-add  Re-add  Re-add Standing balance  FOAM marching  SLS  Full tandem stance  Forward and backward walking  Side stepping     20 x  3 x 30 sec B  3 x 30 sec B  6 x Patient added a dance step with good balance  6 x Patient added a dance step with good balance     GAIT TRAINING  CPT 76209 TOTAL TIME FOR SESSION Not performed      n  nv Ambulation   Even terrain with SPC and no AD Uneven terrain w/SPC    Walked from ortho gym to neuro center stairs and back CS     Dynamic gait      n Stair negotiation 1 flight of steps with B railings and cgx1     Curb negotiation       Ramp negotiation       Outdoor ambulation       BWS/vector training       MANUAL  CPT 30195 TOTAL TIME FOR SESSION Not performed     Stretching 7/25/24 - Attempted gentle HS and gentle SAUMYA stretch, held due to pain     Mobilization        Massage       Taping         Bexarotene Pregnancy And Lactation Text: This medication is Pregnancy Category X and should not be given to women who are pregnant or may become pregnant. This medication should not be used if you are breast feeding.

## 2024-09-04 NOTE — PROGRESS NOTES
"Physical Therapy Visit    PT DAILY NOTE FOR OUTPATIENT THERAPY    Patient: Jose Fuentes MRN: [id number]  : 1932 89 y.o.  Referring Physician: Sj Chan MD  Date of Visit: 2024    Certification Dates: 24 through 10/01/24    Diagnosis:   1. Displaced fracture of greater trochanter of right femur, initial encounter for closed fracture (CMS/[facility])    2. Difficulty in walking        Chief Complaints:  decreased ROM, strength, balance, and gait deviations.    Precautions:   Existing Precautions/Restrictions: cardiac  Precautions comments: CAD, tachycardia      TODAY'S VISIT    Time In Session:  Start Time: 1510  Stop Time: 1600  Time Calculation (min): 50 min   History/Vitals/Pain/Encounter Info - 24 1514          Injury History/Precautions/Daily Required Info    Document Type daily treatment     Primary Therapist Christine Mulvihill, PT     Chief Complaint/Reason for Visit  decreased ROM, strength, balance, and gait deviations.     Onset of Illness/Injury or Date of Surgery 24     Referring Physician Dr. Sj Chan     Existing Precautions/Restrictions cardiac     Precautions comments CAD, tachycardia     History of present illness/functional impairment Jose Fuentes is a 89 year old male who reports approximately 2 weeks ago he woke up during the middle of the night and the bed was spinning. He reports his daughter came and \"recalibrated his crystals\" by putting his head back and the vertigo resolved. The patient reports he had vertigo again the next evening with less intensity and resolved without treatment. He reports he had dizziness during the night 2 days ago. He reports he has dizziness when he lies on his back or rolls to either side however it occurs inconsistently. The patient denies a history of vertigo. He is currently sleeping with his head elevated. He sustained a R hip fracture on 24 from a fall and is currently receiving ortho treatment in PT at " "[facility]. He is using a rollator walker.     Patient/Family/Caregiver Comments/Observations Michael arrived 10 minutes late with no pain, falls or med changes. He just got back from his trip to [place]. He is tired, but no pain. He did a lot of walking and was very active on his trip.     Patient reported fall since last visit No        Pain Assessment    Currently in pain No/Denies        Pain Intervention    Intervention  exercise     Post Intervention Comments no pain                    Daily Treatment Assessment and Plan - 09/04/24 1555          Daily Treatment Assessment and Plan    Progress toward goals Progressing     Daily Outcome Summary Michael arrived a little tired as he just got back yesterday from a very active trip to [place]. He has no pain. Performed LE exercises and NUSTEP with good tolerance.     Plan and Recommendations Progress note next visit.                             Today's Treatment:    ORTHO PT FLOWSHEET     Y/N Exercises Current Session Time     MODALITIES- HEAT/ICE  CPT 51407 TOTAL TIME FOR SESSION Not performed     Heat       Ice/Vasocompression       Mechanical Traction       THER ACT  CPT 78634 TOTAL TIME FOR SESSION Brief     Pt Education Pt educated re: POC, objective findings, importance of attendance and performance of HEP.  7/29/24 pt and pts daughter educated re:  PN results and plan for continued PT.  If vestibular PT indicated and cause of balance deficits and gait dysfunction will discuss need for ortho and vestibular PT simultaneously.     Transfer training     y           n            n Pain, falls, meds and vitals Monitored pre rx, and post visit   7/25/24 - performed numerous vitals throughout treatment due to lower than normal reading upon arrival.  Also performed orthostatic readings as per flow sheet  8/5/24 - vitals, pt slightly \"off\" per daughter coming from OT, she noted usually his blood sugar.  Vitals wnls and after eating tho, pt returned to normal with light easy " exercises.  8/8/24 Vitals still a little low, increased after NUSTEP and standing exercises. Discussed stressors in his life that may be contributing to his feeling off, such as sleep disturbances etc.      THER EX  CPT 84343 TOTAL TIME FOR SESSION 50 minutes     CARDIOVASCULAR        HEP INSTRUCTION Instructed in primary HEP consisting of bridtes, bkfo, quad sets, glut sets.  Given exercise pro sheet and emailed to pts daughter.  See copy  below flow sheet  8/5/24 - added standing hip flex/ext/abd, heel raises,  to HEP see exercise pro sheet.     y NUSTEP seat #11 arms #12 Level 4 x 10 minutes [facility]     Mappyfriends       Elliptical       Treadmill       STRENGTHENING        y  y  y  y  y  y  y  y  y Supine ther-ex  Quad sets  Glut sets  Ball squeezes  Clamelissals  SKFO   Marches  Bridges  SAQ  SLR       1 x 10 5 sec B   1 x 10 5 sec B  2 x 10 3 sec  3 x 10 3 sec green band  3 x 10 3 sec green band  3 x 10 3 sec green band  3 x 10 3 sec green band  3 x 10 3 sec 1# B    3 x 10 1# B       n Seated ther-ex   Sit to stand    2 x 10      n  n  n  n      Standing ther-ex  Heel raises  Hip flex  Hip ext  Hip abd  Mini squats      2 x 10  1#       2 x 10  1#  2 x 10  1#  2 x 10  1#          STRETCHING           n    LE stretching  HS strap stretch  SAUMYA  Frog stretch    3 x 30 seconds  Held SAUMYA due to reports of pain with self stretch  3 x 30 sec     Other stretching       REPEATED MOVEMENTS       NEURO RE-ED  CPT 59775 TOTAL TIME FOR SESSION Not Performed     COORDINATION       POSTURAL RE-ED           PRE-GAIT ACTIVITIES        BALANCE TRAINING        Sitting balance           Re-add  Re-add  Re-add  Re-add Standing balance  FOAM marching  SLS  Full tandem stance  Forward and backward walking  Side stepping     20 x  3 x 30 sec B  3 x 30 sec B  6 x Patient added a dance step with good balance  6 x Patient added a dance step with good balance     GAIT TRAINING  CPT 44406 TOTAL TIME FOR SESSION Not performed       n  nv Ambulation   Even terrain with SPC and no AD Uneven terrain w/SPC    Walked from ortho gym to neuro center stairs and back CS     Dynamic gait      n Stair negotiation 1 flight of steps with B railings and cgx1     Curb negotiation       Ramp negotiation       Outdoor ambulation       BWS/vector training       MANUAL  CPT 32163 TOTAL TIME FOR SESSION Not performed     Stretching 7/25/24 - Attempted gentle HS and gentle SAUMYA stretch, held due to pain     Mobilization        Massage       Taping

## 2024-09-05 ENCOUNTER — DOCUMENTATION (OUTPATIENT)
Dept: PHYSICAL THERAPY | Facility: REHABILITATION | Age: 89
End: 2024-09-05
Payer: MEDICARE

## 2024-09-05 NOTE — PROGRESS NOTES
Physical Therapy Discharge      PT DISCHARGE NOTE FOR OUTPATIENT THERAPY    Patient: Jose Fuentes MRN: [id number]  : 1932 89 y.o.  Referring Physician: Grace Stroud MD  Date of Visit: 2024      Certification Dates: 24 through 24    Total Visit Count: 2    Diagnosis:   1. Benign paroxysmal positional vertigo, unspecified laterality    2. Vertigo       Chief Complaints:  Chief Complaint   Patient presents with    PT Discharge       Precautions:         TODAY'S VISIT:    Time In Session:            PT - 24 4824          Physical Therapy    Physical Therapy Specialty Vestibular Rehab        PT Plan    Frequency of treatment 2 times/week     PT Duration 4 weeks     PT Custom Frequency and Duration reduce to 1x/week as clinically appropriate     PT Cert From 24     PT Cert To 24     Date PT POC was sent to provider 24     Signed PT Plan of Care received?  Yes                       OBJECTIVE MEASUREMENTS/DATA:    None taken      Outcome Measures          2024    12:00   PT SUBJECTIVE Outcome Measures   DHI 10       Today's Treatment:    The patient was last seen 24 with positional testing (-) and patient reporting no vertigo. Vertigo has not returned therefore patient is discharged.    Education provided:  None/NA         Goals Addressed                      This Visit's Progress       Patient Stated      COMPLETED: Vestibular Patient Stated (pt-stated)         To resolve vertigo/dizziness.         Other      COMPLETED: Vestibular PT STG/LTG         Short Term Goals Time Frame Result Comment/Progress   Positional testing for BPPV to be reassessed and treatment to be initiated if positive for BPPV. 1-2 weeks met 24   Patient will report less episodes of dizziness lying down and rolling. 1-2 weeks met 24        Long Term Goals Time Frame Result Comment/Progress   Patient will be negative for BPPV in all canals to reduce symptoms with functional  mobility.  4 weeks met 8/5/24   Patient will improve score on DHI to 4/100 for decreased report of symptoms with daily activities. 4 weeks Not RA  8/5/24   The patient will report no vertigo with daily activities. 4 weeks met 8/5/24                  Discharge information for [facility]:    Reason for D/C: Goals met  Was care interrupted for medical reason?: No  Did the patient demonstrate increased independence: Yes  Demonstration of independence:: Increased functional independence  Has the patient returned to productive activity?: Yes  Increased production assessment:: Return to household activities (without vertigo)  Skin integrity: Intact - No issues

## 2024-09-06 ENCOUNTER — HOSPITAL ENCOUNTER (OUTPATIENT)
Dept: PHYSICAL THERAPY | Facility: REHABILITATION | Age: 89
Setting detail: THERAPIES SERIES
Discharge: HOME | End: 2024-09-06
Attending: SPECIALIST
Payer: MEDICARE

## 2024-09-06 DIAGNOSIS — R26.2 DIFFICULTY IN WALKING: ICD-10-CM

## 2024-09-06 DIAGNOSIS — S72.111A DISPLACED FRACTURE OF GREATER TROCHANTER OF RIGHT FEMUR, INITIAL ENCOUNTER FOR CLOSED FRACTURE (CMS/HCC): Primary | ICD-10-CM

## 2024-09-06 PROCEDURE — 97110 THERAPEUTIC EXERCISES: CPT | Mod: GP,KX

## 2024-09-06 PROCEDURE — 97530 THERAPEUTIC ACTIVITIES: CPT | Mod: GP,KX

## 2024-09-06 ASSESSMENT — GAIT ASSESSMENTS
TUG TIME: 11.1
TUG TIME: 10.7
TUG TIME: 11.5
TUG TIME: 12.7

## 2024-09-06 NOTE — OP PT TREATMENT LOG
"ORTHO PT FLOWSHEET     Y/N Exercises Current Session Time     MODALITIES- HEAT/ICE  CPT 03814 TOTAL TIME FOR SESSION Not performed     Heat       Ice/Vasocompression       Mechanical Traction       THER ACT  CPT 79868 TOTAL TIME FOR SESSION 30 minutes    y Pt Education Pt educated on progress note findings    y Assessment   Objective measures taken (PN) ROM, MMT, balance, gait, LEFS, 30s STS, 2mwt, TUG   y                        Pain, falls, meds and vitals Screened  7/25/24 - performed numerous vitals throughout treatment due to lower than normal reading upon arrival.  Also performed orthostatic readings as per flow sheet  8/5/24 - vitals, pt slightly \"off\" per daughter coming from OT, she noted usually his blood sugar.  Vitals wnls and after eating tho, pt returned to normal with light easy exercises.  8/8/24 Vitals still a little low, increased after NUSTEP and standing exercises. Discussed stressors in his life that may be contributing to his feeling off, such as sleep disturbances etc.      THER EX  CPT 70580 TOTAL TIME FOR SESSION 30 minutes     CARDIOVASCULAR        HEP INSTRUCTION Instructed in primary HEP consisting of bridtes, bkfo, quad sets, glut sets.  Given exercise pro sheet and emailed to pts daughter.  See copy  below flow sheet  8/5/24 - added standing hip flex/ext/abd, heel raises,  to HEP see exercise pro sheet.     y NUSTEP seat #11 arms #12 Level 4 x 10 minutes [facility]     MobileGlobe Bike       Elliptical       Treadmill       STRENGTHENING        y  y  y  y  Y  Y  Y  n  n Supine ther-ex  Quad sets  Glut sets  Ball squeezes  Clamshells  SKFO   Marches  Bridges  SAQ  SLR       1 x 10 5 sec B   1 x 10 5 sec B  2 x 10 3 sec  3 x 10 3 sec green band  3 x 10 3 sec green band  3 x 10 3 sec green band  3 x 10 3 sec green band  3 x 10 3 sec 1# B    3 x 10 1# B       n Seated ther-ex   Sit to stand    2 x 10      n  n  n  n      Standing ther-ex  Heel raises  Hip flex  Hip ext  Hip " abd  Mini squats      2 x 10  1#       2 x 10  1#  2 x 10  1#  2 x 10  1#          STRETCHING           n    LE stretching  HS strap stretch  SAUMYA  Frog stretch    3 x 30 seconds  Held SAUMYA due to reports of pain with self stretch  3 x 30 sec     Other stretching       REPEATED MOVEMENTS       NEURO RE-ED  CPT 37181 TOTAL TIME FOR SESSION Not Performed     COORDINATION       POSTURAL RE-ED           PRE-GAIT ACTIVITIES        BALANCE TRAINING        Sitting balance           Re-add  Re-add  Re-add  Re-add Standing balance  FOAM marching  SLS  Full tandem stance  Forward and backward walking  Side stepping     20 x  3 x 30 sec B  3 x 30 sec B  6 x Patient added a dance step with good balance  6 x Patient added a dance step with good balance     GAIT TRAINING  CPT 30249 TOTAL TIME FOR SESSION Not performed      n  nv Ambulation   Even terrain with SPC and no AD Uneven terrain w/SPC    Walked from ortho gym to neuro center stairs and back CS     Dynamic gait      n Stair negotiation 1 flight of steps with B railings and cgx1     Curb negotiation       Ramp negotiation       Outdoor ambulation       BWS/vector training       MANUAL  CPT 98282 TOTAL TIME FOR SESSION Not performed     Stretching 7/25/24 - Attempted gentle HS and gentle SAUMYA stretch, held due to pain     Mobilization        Massage       Taping

## 2024-09-06 NOTE — PROGRESS NOTES
"Physical Therapy Progress Note    PT PROGRESS NOTE FOR OUTPATIENT THERAPY    Patient: Jose Fuentes   MRN: [id number]  : 1932 89 y.o.    Referring Physician: Sj Chan MD  Date of Visit: 2024    Certification Dates: 24 through 10/01/24    Recommended Frequency & Duration:  3 times/week for up to 3 months   reduce to 1x/week as clinically appropriate    Diagnosis:   1. Displaced fracture of greater trochanter of right femur, initial encounter for closed fracture (CMS/[facility])    2. Difficulty in walking        Chief Complaints:  Chief Complaint   Patient presents with    Dec ROM    Dec Strength    Balance Deficits    Pain    Abnormality Of Gait    Joint Pain       Precautions:   Existing Precautions/Restrictions: cardiac  Precautions comments: CAD, tachycardia    TODAY'S VISIT:    Time In Session:  Start Time: 145  Stop Time: 1556  Time Calculation (min): 60 min   General Information - 24 1455          Session Details    Document Type progress note     Mode of Treatment individual therapy        General Information    Onset of Illness/Injury or Date of Surgery 24     Referring Physician Dr. Sj Chan     History of present illness/functional impairment Jose Fuentes is a 89 year old male who reports approximately 2 weeks ago he woke up during the middle of the night and the bed was spinning. He reports his daughter came and \"recalibrated his crystals\" by putting his head back and the vertigo resolved. The patient reports he had vertigo again the next evening with less intensity and resolved without treatment. He reports he had dizziness during the night 2 days ago. He reports he has dizziness when he lies on his back or rolls to either side however it occurs inconsistently. The patient denies a history of vertigo. He is currently sleeping with his head elevated. He sustained a R hip fracture on 24 from a fall and is currently receiving ortho treatment in PT at " [facility]. He is using a rollator walker.     Patient/Family/Caregiver Comments/Observations Pt reports he is back to 50% of PLOF. Notes improvements in his mobility and use of RW or SPC, walking tolerance, and strength. However with deficits with balance, gait pattern, and overall activity tolerance/function. Pt would like to continue PT per POC to return to PLOF.     Existing Precautions/Restrictions cardiac     Precautions comments CAD, tachycardia         Services    Do You Speak a Language Other Than English at Home? no        Behavioral Health Related Communication    Suicidal Ideation No                    Daily Falls Screen - 09/06/24 1455          Daily Falls Assessment    Patient reported fall since last visit No                    Pain/Vitals - 09/06/24 1455          Pain Assessment    Currently in pain No/Denies                    PT - 09/06/24 1455          Physical Therapy    Physical Therapy Specialty Ortho and Sports PT        PT Plan    Frequency of treatment 3 times/week     PT Duration 3 months     PT Custom Frequency and Duration reduce to 1x/week as clinically appropriate     PT Cert From 07/01/24     PT Cert To 10/01/24     Date PT POC was sent to provider 07/03/24     Signed PT Plan of Care received?  Yes                    Assessment and Plan - 09/06/24 1455          Assessment    Plan of Care reviewed and patient/family in agreement Yes     System Pathology/Pathophysiology Noted musculoskeletal     Functional Limitations in Following Categories (PT Eval) self-care;home management;community/leisure     Rehab Potential/Prognosis good, to achieve stated therapy goals     Problem List decreased ROM;decreased strength;decreased endurance;decreased flexibility;pain;impaired balance     Clinical Assessment Pt presents with no pain today. Pt demonstrates increased hip AROM/strength, increased knee strength, improved gait pattern, increased score on LEFS and PSFS outcome measure which indicates  increased overall LE function, improved scores on TUG and 30s STS which indicates better dynamic balance and endurance, respectively, and sigificant increased in 2mwt distance which indicates better gait and endurance. In addition, pt has met STG's and some LTG's and is progressing well towards remainign goals. However, still progressing towards normal gait and strength to support functional activity. Pt will benefit from continued skilled PT to address remaining deficits to return to PLOF.     Plan and Recommendations Progress functional CKC exercises per flowsheet as tolerated.     Planned Services CPT 97787 Aquatic therapy/exercises;CPT 54416 Gait training;CPT 21159 Manual therapy;CPT 96837 Neuromuscular Reeducation;CPT 47653 Therapeutic activities;CPT 80007 Therapeutic exercises;CPT 95207 Electrical stimulation ATTENDED;CPT 18739 Electrical stimulation UNATTENDED;CPT 96866 Hot/Cold Packs therapy                     OBJECTIVE MEASUREMENTS/DATA:    Balance/Posture    Balance and Posture - 09/06/24 1455          Balance Assessment    Comment, Balance SLS (L) 1 second, (R) 4 second, Tandem stance (L) 7.9 sec, (R) 3.6 seconds                     ROM and MMT          7/1/2024    16:00 7/29/2024    15:00 9/6/2024   PT LE ROM Measurements   PROM: Right Hip Flexion 34 degrees       (-) SLR     AROM: Right Hip Flexion 95 degrees 108 degrees 125 degrees   PROM: Left Hip Flexion 38 degrees       SLR (-)     AROM: Left Hip Flexion 108 degrees     AROM: Right Hip ABD 15 degrees 28 degrees 28 degrees   AROM: Left Hip ABD 16 degrees     AROM: Right Hip ADD 0 degrees     AROM: Left Hip ADD 0 degrees     AROM: Right Knee Flexion 120 130 130   AROM: Left Knee Flexion 120     AROM: Right Knee Extension -8 0 0   AROM: Left Knee Extension -7     PT LE MMT   Right Hip Flexion  (4/5) good (4+/5) good plus   Left Hip Flexion  (4+/5) good plus (5/5) normal   Right Hip ABD  (4+/5) good plus (4+/5) good plus   Left Hip ABD  (5/5) normal  "(5/5) normal   Right Hip ADD  (4/5) good (4+/5) good plus   Left Hip ADD  (4+/5) good plus (5/5) normal   Right Knee Flexion  (4+/5) good plus (5/5) normal   Left Knee Flexion  (5/5) normal (5/5) normal   Right Knee Extension  (4+/5) good plus (5/5) normal   Left Knee Extension  (5/5) normal (5/5) normal     Outcome Measures          7/1/2024    14:49 7/29/2024    15:19 9/6/2024    14:55   PT OBJECTIVE Outcome Measures   2 Minute Walk Test 313 feet with SPC and close S 348 ft w/ SPC close S   (313 feet with SPC and close S) 405 feet no AD but close supervision w/ gait belt   30 Second Sit to Stand 4 repetitions       with use of B UEs and unable to perform repetitions past 25 seconds 6 repetitions 8 repetitions   TUG (Mean)  13.5 11.5   TUG - Results  performed with SPC with close S    PT SUBJECTIVE Outcome Measures   LEFS 26/80 =  33% functioning 33/80  =   41% functioning, 59% disabled 56/80 = 70% functioning   PSFS % Score 15 % 15 % 55 %       Today's Treatment::    Education provided:  Yes: See treatment log for details of education provided    ORTHO PT FLOWSHEET     Y/N Exercises Current Session Time     MODALITIES- HEAT/ICE  CPT 48578 TOTAL TIME FOR SESSION Not performed     Heat       Ice/Vasocompression       Mechanical Traction       THER ACT  CPT 25754 TOTAL TIME FOR SESSION 30 minutes    y Pt Education Pt educated on progress note findings    y Assessment   Objective measures taken (PN) ROM, MMT, balance, gait, LEFS, 30s STS, 2mwt, TUG   y                        Pain, falls, meds and vitals Screened  7/25/24 - performed numerous vitals throughout treatment due to lower than normal reading upon arrival.  Also performed orthostatic readings as per flow sheet  8/5/24 - vitals, pt slightly \"off\" per daughter coming from OT, she noted usually his blood sugar.  Vitals wnls and after eating tho, pt returned to normal with light easy exercises.  8/8/24 Vitals still a little low, increased after NUSTEP and " standing exercises. Discussed stressors in his life that may be contributing to his feeling off, such as sleep disturbances etc.      THER EX  CPT 23810 TOTAL TIME FOR SESSION 30 minutes     CARDIOVASCULAR        HEP INSTRUCTION Instructed in primary HEP consisting of bridtes, bkfo, quad sets, glut sets.  Given exercise pro sheet and emailed to pts daughter.  See copy  below flow sheet  8/5/24 - added standing hip flex/ext/abd, heel raises,  to HEP see exercise pro sheet.     y NUSTEP seat #11 arms #12 Level 4 x 10 minutes [facility]     [employer]       Elliptical       Treadmill       STRENGTHENING        y  y  y  y  Y  Y  Y  n  n Supine ther-ex  Quad sets  Glut sets  Ball squeezes  Clamshells  SKFO   Marches  Bridges  SAQ  SLR       1 x 10 5 sec B   1 x 10 5 sec B  2 x 10 3 sec  3 x 10 3 sec green band  3 x 10 3 sec green band  3 x 10 3 sec green band  3 x 10 3 sec green band  3 x 10 3 sec 1# B    3 x 10 1# B       n Seated ther-ex   Sit to stand    2 x 10      n  n  n  n      Standing ther-ex  Heel raises  Hip flex  Hip ext  Hip abd  Mini squats      2 x 10  1#       2 x 10  1#  2 x 10  1#  2 x 10  1#          STRETCHING           n    LE stretching  HS strap stretch  SAUMYA  Frog stretch    3 x 30 seconds  Held SAUMYA due to reports of pain with self stretch  3 x 30 sec     Other stretching       REPEATED MOVEMENTS       NEURO RE-ED  CPT 67890 TOTAL TIME FOR SESSION Not Performed     COORDINATION       POSTURAL RE-ED           PRE-GAIT ACTIVITIES        BALANCE TRAINING        Sitting balance           Re-add  Re-add  Re-add  Re-add Standing balance  FOAM marching  SLS  Full tandem stance  Forward and backward walking  Side stepping     20 x  3 x 30 sec B  3 x 30 sec B  6 x Patient added a dance step with good balance  6 x Patient added a dance step with good balance     GAIT TRAINING  CPT 42381 TOTAL TIME FOR SESSION Not performed      n  nv Ambulation   Even terrain with SPC and no AD Uneven terrain  w/SPC    Walked from ortho gym to neuro center stairs and back CS     Dynamic gait      n Stair negotiation 1 flight of steps with B railings and [id number]     Curb negotiation       Ramp negotiation       Outdoor ambulation       BWS/vector training       MANUAL  CPT 36629 TOTAL TIME FOR SESSION Not performed     Stretching 7/25/24 - Attempted gentle HS and gentle SAUMYA stretch, held due to pain     Mobilization        Massage       Taping           Goals Addressed                   This Visit's Progress     Mutually agreed upon pain goal   On track     Mutually agreed upon pain goal: Will be going on vacation and would like to be able [place]. (Ongoing)       OP PT RIGHT HIP FRACTURE LONG TERM GOALS   On track     Long term goals  - hip fracture  Long Term Goals Time Frame Result Comment/Progress   Pt will increase right LE  MMT into flexion, abduction, ER and IR >/= full grade 8-12 weeks progress    Pt with AROM of right hip to wfls to allow for return to all preexisting adls.  8-12 weeks progress    Pt with ability to ambulate moderate community distances  without AD or with B walking sticks with minimal difficulties and brief rest periods.   8-12 weeks progress    Increase LEFS >/= to 60-70% functioning to indicate overall  increase functioning 8-12 weeks MET    Improve 2 MWT to 492 feet to meet age related norms to indicate increased endurance and gait speed. 8-12 weeks progress 405 feet   Increase TUG to 13.5 sec or greater  to decrease fall risk 8-12 weeks MET 11.5   Pt will be able to transfer sit <-> stand from a normal chair without use of B Ues to indicate overall improvement in LE strength 8-12 weeks Mostly met Medium chair   Pt will have complaints of 1/10 pain decreased by 75% or greater. 8-12 weeks MET    Pt will be able to ascend/descent 12 steps with reciprocal pattern with 1 railing 8-12 weeks MET    Pt will be able to ambulate with B walking sticks on uneven  terrain without difficulties. 8-12 weeks progress         OP PT RIGHT HIP SHORT TERM GOALS 2024   On track     Short term goals {[facility] PT    Short Term Goals Time Frame Result Comment/Progress   TUG test  MET    Pt will increase R LE  MMT into flexion, abduction, ER and IR >/= ½ grade 4-6 weeks MET    Pt will increase right knee ROM >/= 10 degrees into flexion, extension to improve functional mobility 4-6 weeks MET    Pt will be able to ambulate short community distances with SPC with close S without difficulties. 4-6 weeks MET    Increase LEFS >/= 9 points to increase function   4-6 weeks MET    Pt will improve 2 MWT by 48 feet to meet MDC. 4-6 weeks MET    Increase TUG </= 13.5 sec to decrease fall risk 4-6 weeks MET    Pt will be able to ascend 6 steps with SPC and one railing forward with reciprocal pattern without noted difficulties. 4-6 weeks  MET    Pt will be able to safely ambulate household and limited community distances with SPC with modified I.    4-6 weeks MET    Pt with ability to transfer from a lower seat with use of one UE for assistance with minimal difficulties. 4-6 weeks MET    Pt will be independent with HEP to increase carryover at home 4-6 weeks MET                Ronn King, PT

## 2024-09-11 ENCOUNTER — HOSPITAL ENCOUNTER (OUTPATIENT)
Dept: PHYSICAL THERAPY | Facility: REHABILITATION | Age: 89
Setting detail: THERAPIES SERIES
Discharge: HOME | End: 2024-09-11
Attending: SPECIALIST
Payer: MEDICARE

## 2024-09-11 DIAGNOSIS — S72.111A DISPLACED FRACTURE OF GREATER TROCHANTER OF RIGHT FEMUR, INITIAL ENCOUNTER FOR CLOSED FRACTURE (CMS/HCC): Primary | ICD-10-CM

## 2024-09-11 DIAGNOSIS — R26.2 DIFFICULTY IN WALKING: ICD-10-CM

## 2024-09-11 PROCEDURE — 97116 GAIT TRAINING THERAPY: CPT | Mod: GP,CQ,KX

## 2024-09-11 PROCEDURE — 97112 NEUROMUSCULAR REEDUCATION: CPT | Mod: GP,CQ,KX

## 2024-09-11 PROCEDURE — 97110 THERAPEUTIC EXERCISES: CPT | Mod: GP,CQ,KX

## 2024-09-11 NOTE — PROGRESS NOTES
"Physical Therapy Visit    PT DAILY NOTE FOR OUTPATIENT THERAPY    Patient: Jose Fuentes MRN: [id number]  : 1932 89 y.o.  Referring Physician: Sj Chan MD  Date of Visit: 2024    Certification Dates: 24 through 10/01/24    Diagnosis:   1. Displaced fracture of greater trochanter of right femur, initial encounter for closed fracture (CMS/[facility])    2. Difficulty in walking        Chief Complaints:  decreased ROM, strength, balance, and gait deviations.    Precautions:   Existing Precautions/Restrictions: cardiac  Precautions comments: CAD, tachycardia      TODAY'S VISIT    Time In Session:  Start Time: 1500  Stop Time: 1555  Time Calculation (min): 55 min   History/Vitals/Pain/Encounter Info - 24 1458          Injury History/Precautions/Daily Required Info    Document Type daily treatment     Primary Therapist Christine Mulvihill, PT     Chief Complaint/Reason for Visit  decreased ROM, strength, balance, and gait deviations.     Onset of Illness/Injury or Date of Surgery 24     Referring Physician Dr. Sj Chan     Existing Precautions/Restrictions cardiac     Precautions comments CAD, tachycardia     History of present illness/functional impairment Jose Fuentes is a 89 year old male who reports approximately 2 weeks ago he woke up during the middle of the night and the bed was spinning. He reports his daughter came and \"recalibrated his crystals\" by putting his head back and the vertigo resolved. The patient reports he had vertigo again the next evening with less intensity and resolved without treatment. He reports he had dizziness during the night 2 days ago. He reports he has dizziness when he lies on his back or rolls to either side however it occurs inconsistently. The patient denies a history of vertigo. He is currently sleeping with his head elevated. He sustained a R hip fracture on 24 from a fall and is currently receiving ortho treatment in PT at " [facility]. He is using a rollator walker.     Patient/Family/Caregiver Comments/Observations Michael arrived with no pain, falls or med changes. He is slowly improving. He reports taking walks outside with his daughter Grant usually carrying his cane.     Patient reported fall since last visit No        Pain Assessment    Currently in pain No/Denies        Pain Intervention    Intervention  exercise     Post Intervention Comments no pain         Services    Do You Speak a Language Other Than English at Home? no                    Daily Treatment Assessment and Plan - 09/11/24 1607          Daily Treatment Assessment and Plan    Progress toward goals Progressing     Daily Outcome Summary Michael arrived with no pain, falls or med changes. He asked to focus on gait and balance. Peformed warm up on NUSTEP followed by walking to full flight of stairs, ascending and descending with 1 rail and cane with CS. Waked outside up and down asphalt inclines/declines, grassy hill, across the wooden deck to pond all while carrying his cane. Had gait belt donned, but did not need it. Rested for a few minutes then finished session in parallel bars for balance with eyes open and closed with varied foot positions, adding ball toss with therapy aide.     Plan and Recommendations Continue to focus on gait and balance activities.                             Today's Treatment:    ORTHO PT FLOWSHEET     Y/N Exercises Current Session Time     MODALITIES- HEAT/ICE  CPT 94207 TOTAL TIME FOR SESSION Not performed     Heat       Ice/Vasocompression       Mechanical Traction       THER ACT  CPT 55849 TOTAL TIME FOR SESSION brief   n Pt Education Pt educated on progress note findings   n Assessment   Objective measures taken (PN) ROM, MMT, balance, gait, LEFS, 30s STS, 2mwt, TUG   y                        Pain, falls, meds and vitals Screened  7/25/24 - performed numerous vitals throughout treatment due to lower than normal reading upon arrival.   "Also performed orthostatic readings as per flow sheet  8/5/24 - vitals, pt slightly \"off\" per daughter coming from OT, she noted usually his blood sugar.  Vitals wnls and after eating tho, pt returned to normal with light easy exercises.  8/8/24 Vitals still a little low, increased after NUSTEP and standing exercises. Discussed stressors in his life that may be contributing to his feeling off, such as sleep disturbances etc.      THER EX  CPT 63405 TOTAL TIME FOR SESSION  10 minutes     CARDIOVASCULAR        HEP INSTRUCTION Instructed in primary HEP consisting of bridtes, bkfo, quad sets, glut sets.  Given exercise pro sheet and emailed to pts daughter.  See copy  below flow sheet  8/5/24 - added standing hip flex/ext/abd, heel raises,  to HEP see exercise pro sheet.     y NUSTEP seat #11 arms #12 Level 4 x 10 minutes [facility]     [employer] Bike       Elliptical       Treadmill       STRENGTHENING        n  n  n  n  n  n  n  n  n Supine ther-ex  Quad sets  Glut sets  Ball squeezes  Clamshells  SKFO   Marches  Bridges  SAQ  SLR       1 x 10 5 sec B   1 x 10 5 sec B  2 x 10 3 sec  3 x 10 3 sec green band  3 x 10 3 sec green band  3 x 10 3 sec green band  3 x 10 3 sec green band  3 x 10 3 sec B  2#   3 x 10  B  2#      n Seated ther-ex   Sit to stand    2 x 10      n  n  n  n      Standing ther-ex  Heel raises  Hip flex  Hip ext  Hip abd  Mini squats      2 x 10  1#       2 x 10  1#  2 x 10  1#  2 x 10  1#          STRETCHING           n    LE stretching  HS strap stretch  SAUMYA  Frog stretch    3 x 30 seconds  Held SAUMYA due to reports of pain with self stretch  3 x 30 sec     Other stretching       REPEATED MOVEMENTS       NEURO RE-ED  CPT 61111 TOTAL TIME FOR SESSION 15 minutes     COORDINATION       POSTURAL RE-ED           PRE-GAIT ACTIVITIES        BALANCE TRAINING        Sitting balance        y  n  y  y  y  y Standing balance  FOAM marching  SLS  FT EC on floor  Modified tandem stance  Forward and " backward walking  Side stepping     20 x    3 x 30 sec B  3 x 30 sec B EO and EC and with ball toss to aide  6 x  6 x each direction     GAIT TRAINING  CPT 51858 TOTAL TIME FOR SESSION 30 minutes      y    y Ambulation   Even terrain with SPC     Uneven terrain with SPC    Walked from ortho gym to neuro center stairs and back CS (gait belt on but not needed)  Walked outside around courtyard, up and down asphalt hills, onto pond deck (gait belt on but not needed)     Dynamic gait      y Stair negotiation 1 flight of steps with 1 railings and SPC CS (gait belt on but not needed)      Curb negotiation       Ramp negotiation       Outdoor ambulation       BWS/vector training       MANUAL  CPT 95815 TOTAL TIME FOR SESSION Not performed     Stretching 7/25/24 - Attempted gentle HS and gentle SAUMYA stretch, held due to pain     Mobilization        Massage       Taping

## 2024-09-11 NOTE — OP PT TREATMENT LOG
"ORTHO PT FLOWSHEET     Y/N Exercises Current Session Time     MODALITIES- HEAT/ICE  CPT 03750 TOTAL TIME FOR SESSION Not performed     Heat       Ice/Vasocompression       Mechanical Traction       THER ACT  CPT 89857 TOTAL TIME FOR SESSION brief   n Pt Education Pt educated on progress note findings   n Assessment   Objective measures taken (PN) ROM, MMT, balance, gait, LEFS, 30s STS, 2mwt, TUG   y                        Pain, falls, meds and vitals Screened  7/25/24 - performed numerous vitals throughout treatment due to lower than normal reading upon arrival.  Also performed orthostatic readings as per flow sheet  8/5/24 - vitals, pt slightly \"off\" per daughter coming from OT, she noted usually his blood sugar.  Vitals wnls and after eating tho, pt returned to normal with light easy exercises.  8/8/24 Vitals still a little low, increased after NUSTEP and standing exercises. Discussed stressors in his life that may be contributing to his feeling off, such as sleep disturbances etc.      THER EX  CPT 37775 TOTAL TIME FOR SESSION  10 minutes     CARDIOVASCULAR        HEP INSTRUCTION Instructed in primary HEP consisting of bridtes, bkfo, quad sets, glut sets.  Given exercise pro sheet and emailed to pts daughter.  See copy  below flow sheet  8/5/24 - added standing hip flex/ext/abd, heel raises,  to HEP see exercise pro sheet.     y NUSTEP seat #11 arms #12 Level 4 x 10 minutes [facility]     [employer] Bike       Elliptical       Treadmill       STRENGTHENING        n  n  n  n  n  n  n  n  n Supine ther-ex  Quad sets  Glut sets  Ball squeezes  Clamshells  SKFO   Marches  Bridges  SAQ  SLR       1 x 10 5 sec B   1 x 10 5 sec B  2 x 10 3 sec  3 x 10 3 sec green band  3 x 10 3 sec green band  3 x 10 3 sec green band  3 x 10 3 sec green band  3 x 10 3 sec B  2#   3 x 10  B  2#      n Seated ther-ex   Sit to stand    2 x 10      n  n  n  n      Standing ther-ex  Heel raises  Hip flex  Hip ext  Hip abd  Mini " squats      2 x 10  1#       2 x 10  1#  2 x 10  1#  2 x 10  1#          STRETCHING           n    LE stretching  HS strap stretch  SAUMYA  Frog stretch    3 x 30 seconds  Held SAUMYA due to reports of pain with self stretch  3 x 30 sec     Other stretching       REPEATED MOVEMENTS       NEURO RE-ED  CPT 10245 TOTAL TIME FOR SESSION 15 minutes     COORDINATION       POSTURAL RE-ED           PRE-GAIT ACTIVITIES        BALANCE TRAINING        Sitting balance        y  n  y  y  y  y Standing balance  FOAM marching  SLS  FT EC on floor  Modified tandem stance  Forward and backward walking  Side stepping     20 x    3 x 30 sec B  3 x 30 sec B EO and EC and with ball toss to aide  6 x  6 x each direction     GAIT TRAINING  CPT 50121 TOTAL TIME FOR SESSION 30 minutes      y    y Ambulation   Even terrain with SPC     Uneven terrain with SPC    Walked from ortho gym to neuro center stairs and back CS (gait belt on but not needed)  Walked outside around courtyard, up and down asphalt hills, onto pond deck (gait belt on but not needed)     Dynamic gait      y Stair negotiation 1 flight of steps with 1 railings and SPC CS (gait belt on but not needed)      Curb negotiation       Ramp negotiation       Outdoor ambulation       BWS/vector training       MANUAL  CPT 15487 TOTAL TIME FOR SESSION Not performed     Stretching 7/25/24 - Attempted gentle HS and gentle SAUMYA stretch, held due to pain     Mobilization        Massage       Taping

## 2024-09-13 ENCOUNTER — HOSPITAL ENCOUNTER (OUTPATIENT)
Dept: PHYSICAL THERAPY | Facility: REHABILITATION | Age: 89
Setting detail: THERAPIES SERIES
Discharge: HOME | End: 2024-09-13
Attending: SPECIALIST
Payer: MEDICARE

## 2024-09-13 DIAGNOSIS — R26.2 DIFFICULTY IN WALKING: ICD-10-CM

## 2024-09-13 DIAGNOSIS — S72.111A DISPLACED FRACTURE OF GREATER TROCHANTER OF RIGHT FEMUR, INITIAL ENCOUNTER FOR CLOSED FRACTURE (CMS/HCC): Primary | ICD-10-CM

## 2024-09-13 PROCEDURE — 97112 NEUROMUSCULAR REEDUCATION: CPT | Mod: GP,KX

## 2024-09-13 PROCEDURE — 97110 THERAPEUTIC EXERCISES: CPT | Mod: GP,KX

## 2024-09-13 PROCEDURE — 97116 GAIT TRAINING THERAPY: CPT | Mod: GP,KX

## 2024-09-13 NOTE — PROGRESS NOTES
"Physical Therapy Visit    PT DAILY NOTE FOR OUTPATIENT THERAPY    Patient: Jose Fuentes MRN: [id number]  : 1932 89 y.o.  Referring Physician: Sj Chan MD  Date of Visit: 2024    Certification Dates: 24 through 10/01/24    Diagnosis:   1. Displaced fracture of greater trochanter of right femur, initial encounter for closed fracture (CMS/[facility])    2. Difficulty in walking        Chief Complaints:  decreased ROM, strength, balance, and gait deviations.    Precautions:   Existing Precautions/Restrictions: cardiac  Precautions comments: CAD, tachycardia      TODAY'S VISIT    Time In Session:  Start Time: 1357  Stop Time: 1454  Time Calculation (min): 57 min   History/Vitals/Pain/Encounter Info - 24 1356          Injury History/Precautions/Daily Required Info    Document Type daily treatment     Primary Therapist Christine Mulvihill, PT     Chief Complaint/Reason for Visit  decreased ROM, strength, balance, and gait deviations.     Onset of Illness/Injury or Date of Surgery 24     Referring Physician Dr. Sj Chan     Existing Precautions/Restrictions cardiac     Precautions comments CAD, tachycardia     History of present illness/functional impairment Jose Fuentes is a 89 year old male who reports approximately 2 weeks ago he woke up during the middle of the night and the bed was spinning. He reports his daughter came and \"recalibrated his crystals\" by putting his head back and the vertigo resolved. The patient reports he had vertigo again the next evening with less intensity and resolved without treatment. He reports he had dizziness during the night 2 days ago. He reports he has dizziness when he lies on his back or rolls to either side however it occurs inconsistently. The patient denies a history of vertigo. He is currently sleeping with his head elevated. He sustained a R hip fracture on 24 from a fall and is currently receiving ortho treatment in PT at " "[facility]. He is using a rollator walker.     Patient/Family/Caregiver Comments/Observations Pt reports she was able to walk for 2 miles yesterday without an AD and felt good, just tired.     Patient reported fall since last visit No        Pain Assessment    Currently in pain No/Denies         Services    Do You Speak a Language Other Than English at Home? no                    Daily Treatment Assessment and Plan - 09/13/24 1356          Daily Treatment Assessment and Plan    Progress toward goals Progressing     Daily Outcome Summary Pt presents with improving gait pattern with less relance on AD. Began session with NuStep and went into balance training with no bouts of LOB. Trialed gait inside and outside today with and without SPC and gait belt used and pt did not need SPC for steady gait but only required close supervision with no assitance. However, did require cueing for x. Will benefit from further gait/balance training.     Plan and Recommendations Continue to focus on gait and balance activities.                         OBJECTIVE DATA TAKEN TODAY:    None taken    Today's Treatment:    ORTHO PT FLOWSHEET     Y/N Exercises Current Session Time     MODALITIES- HEAT/ICE  CPT 29651 TOTAL TIME FOR SESSION Not performed     Heat       Ice/Vasocompression       Mechanical Traction       THER ACT  CPT 69627 TOTAL TIME FOR SESSION 2 minutes   n Pt Education Pt educated on progress note findings   n Assessment   Objective measures taken (PN) ROM, MMT, balance, gait, LEFS, 30s STS, 2mwt, TUG   y                        Pain, falls, meds and vitals Screened  7/25/24 - performed numerous vitals throughout treatment due to lower than normal reading upon arrival.  Also performed orthostatic readings as per flow sheet  8/5/24 - vitals, pt slightly \"off\" per daughter coming from OT, she noted usually his blood sugar.  Vitals wnls and after eating tho, pt returned to normal with light easy exercises.  8/8/24 " Vitals still a little low, increased after NUSTEP and standing exercises. Discussed stressors in his life that may be contributing to his feeling off, such as sleep disturbances etc.      THER EX  CPT 63824 TOTAL TIME FOR SESSION  10 minutes     CARDIOVASCULAR        HEP INSTRUCTION Instructed in primary HEP consisting of bridtes, bkfo, quad sets, glut sets.  Given exercise pro sheet and emailed to pts daughter.  See copy  below flow sheet  8/5/24 - added standing hip flex/ext/abd, heel raises,  to HEP see exercise pro sheet.     y NUSTEP seat #11 arms #12 Level 4 x 10 minutes [facility]     [employer] Bike       Elliptical       Treadmill       STRENGTHENING        n  n  n  n  n  n  n  n  n Supine ther-ex  Quad sets  Glut sets  Ball squeezes  Clamelissals  SKFO   Marches  Bridges  SAQ  SLR       1 x 10 5 sec B   1 x 10 5 sec B  2 x 10 3 sec  3 x 10 3 sec green band  3 x 10 3 sec green band  3 x 10 3 sec green band  3 x 10 3 sec green band  3 x 10 3 sec B  2#   3 x 10  B  2#      n Seated ther-ex   Sit to stand    2 x 10      n  n  n  n      Standing ther-ex  Heel raises  Hip flex  Hip ext  Hip abd  Mini squats      2 x 10  1#       2 x 10  1#  2 x 10  1#  2 x 10  1#          STRETCHING           n    LE stretching  HS strap stretch  SAUMYA  Frog stretch    3 x 30 seconds  Held SAUMYA due to reports of pain with self stretch  3 x 30 sec     Other stretching       REPEATED MOVEMENTS       NEURO RE-ED  CPT 17509 TOTAL TIME FOR SESSION 15 minutes     COORDINATION       POSTURAL RE-ED           PRE-GAIT ACTIVITIES        BALANCE TRAINING        Sitting balance        y  n  y  y  y  y Standing balance  FOAM marching  SLS  FT EC on floor  Modified tandem stance  Forward and backward walking  Side stepping     20 x    3 x 30 sec B  3 x 30 sec B EO and EC and with ball toss to aide  6 x  6 x each direction     GAIT TRAINING  CPT 22407 TOTAL TIME FOR SESSION 30 minutes      y    y Ambulation   Even terrain with SPC     Uneven  terrain with SPC    Walked from ortho gym to neuro center stairs and back CS (gait belt on but not needed)  Walked outside around courtyard, up and down asphalt hills, onto pond deck (gait belt on but not needed)     Dynamic gait      y Stair negotiation 1 flight of steps with 1 railings and SPC CS (gait belt on but not needed)      Curb negotiation       Ramp negotiation       Outdoor ambulation       BWS/vector training       MANUAL  CPT 76826 TOTAL TIME FOR SESSION Not performed     Stretching 7/25/24 - Attempted gentle HS and gentle SAUMYA stretch, held due to pain     Mobilization        Massage       Taping

## 2024-09-13 NOTE — OP PT TREATMENT LOG
"ORTHO PT FLOWSHEET     Y/N Exercises Current Session Time     MODALITIES- HEAT/ICE  CPT 23331 TOTAL TIME FOR SESSION Not performed     Heat       Ice/Vasocompression       Mechanical Traction       THER ACT  CPT 96200 TOTAL TIME FOR SESSION 2 minutes   n Pt Education Pt educated on progress note findings   n Assessment   Objective measures taken (PN) ROM, MMT, balance, gait, LEFS, 30s STS, 2mwt, TUG   y                        Pain, falls, meds and vitals Screened  7/25/24 - performed numerous vitals throughout treatment due to lower than normal reading upon arrival.  Also performed orthostatic readings as per flow sheet  8/5/24 - vitals, pt slightly \"off\" per daughter coming from OT, she noted usually his blood sugar.  Vitals wnls and after eating tho, pt returned to normal with light easy exercises.  8/8/24 Vitals still a little low, increased after NUSTEP and standing exercises. Discussed stressors in his life that may be contributing to his feeling off, such as sleep disturbances etc.      THER EX  CPT 75998 TOTAL TIME FOR SESSION  10 minutes     CARDIOVASCULAR        HEP INSTRUCTION Instructed in primary HEP consisting of bridtes, bkfo, quad sets, glut sets.  Given exercise pro sheet and emailed to pts daughter.  See copy  below flow sheet  8/5/24 - added standing hip flex/ext/abd, heel raises,  to HEP see exercise pro sheet.     y NUSTEP seat #11 arms #12 Level 4 x 10 minutes [facility]     [employer] Bike       Elliptical       Treadmill       STRENGTHENING        n  n  n  n  n  n  n  n  n Supine ther-ex  Quad sets  Glut sets  Ball squeezes  Clamshells  SKFO   Marches  Bridges  SAQ  SLR       1 x 10 5 sec B   1 x 10 5 sec B  2 x 10 3 sec  3 x 10 3 sec green band  3 x 10 3 sec green band  3 x 10 3 sec green band  3 x 10 3 sec green band  3 x 10 3 sec B  2#   3 x 10  B  2#      n Seated ther-ex   Sit to stand    2 x 10      n  n  n  n      Standing ther-ex  Heel raises  Hip flex  Hip ext  Hip abd  Mini " squats      2 x 10  1#       2 x 10  1#  2 x 10  1#  2 x 10  1#          STRETCHING           n    LE stretching  HS strap stretch  SAUMYA  Frog stretch    3 x 30 seconds  Held SAUMYA due to reports of pain with self stretch  3 x 30 sec     Other stretching       REPEATED MOVEMENTS       NEURO RE-ED  CPT 92532 TOTAL TIME FOR SESSION 15 minutes     COORDINATION       POSTURAL RE-ED           PRE-GAIT ACTIVITIES        BALANCE TRAINING        Sitting balance        y  n  y  y  y  y Standing balance  FOAM marching  SLS  FT EC on floor  Modified tandem stance  Forward and backward walking  Side stepping     20 x    3 x 30 sec B  3 x 30 sec B EO and EC and with ball toss to aide  6 x  6 x each direction     GAIT TRAINING  CPT 17479 TOTAL TIME FOR SESSION 30 minutes      y    y Ambulation   Even terrain with SPC     Uneven terrain with SPC    Walked from ortho gym to neuro center stairs and back CS (gait belt on but not needed)  Walked outside around courtyard, up and down asphalt hills, onto pond deck (gait belt on but not needed)     Dynamic gait      y Stair negotiation 1 flight of steps with 1 railings and SPC CS (gait belt on but not needed)      Curb negotiation       Ramp negotiation       Outdoor ambulation       BWS/vector training       MANUAL  CPT 91399 TOTAL TIME FOR SESSION Not performed     Stretching 7/25/24 - Attempted gentle HS and gentle SAUMYA stretch, held due to pain     Mobilization        Massage       Taping

## 2024-09-16 ENCOUNTER — HOSPITAL ENCOUNTER (EMERGENCY)
Facility: HOSPITAL | Age: 89
Discharge: HOME | End: 2024-09-16
Attending: EMERGENCY MEDICINE
Payer: MEDICARE

## 2024-09-16 VITALS
RESPIRATION RATE: 20 BRPM | WEIGHT: 180 LBS | OXYGEN SATURATION: 94 % | HEART RATE: 89 BPM | HEIGHT: 72 IN | DIASTOLIC BLOOD PRESSURE: 56 MMHG | SYSTOLIC BLOOD PRESSURE: 134 MMHG | TEMPERATURE: 97.6 F | BODY MASS INDEX: 24.38 KG/M2

## 2024-09-16 DIAGNOSIS — I48.91 ATRIAL FIBRILLATION WITH RAPID VENTRICULAR RESPONSE (CMS/HCC): Primary | ICD-10-CM

## 2024-09-16 LAB
ALBUMIN SERPL-MCNC: 3.8 G/DL (ref 3.5–5.7)
ALP SERPL-CCNC: 79 IU/L (ref 34–125)
ALT SERPL-CCNC: 10 IU/L (ref 7–52)
ANION GAP SERPL CALC-SCNC: 6 MEQ/L (ref 3–15)
AST SERPL-CCNC: 12 IU/L (ref 13–39)
ATRIAL RATE: 102
BASOPHILS # BLD: 0.01 K/UL (ref 0.01–0.1)
BASOPHILS NFR BLD: 0.2 %
BILIRUB SERPL-MCNC: 0.3 MG/DL (ref 0.3–1.2)
BUN SERPL-MCNC: 29 MG/DL (ref 7–25)
CALCIUM SERPL-MCNC: 9 MG/DL (ref 8.6–10.3)
CHLORIDE SERPL-SCNC: 106 MEQ/L (ref 98–107)
CO2 SERPL-SCNC: 26 MEQ/L (ref 21–31)
CREAT SERPL-MCNC: 0.8 MG/DL (ref 0.7–1.3)
DIFFERENTIAL METHOD BLD: ABNORMAL
EGFRCR SERPLBLD CKD-EPI 2021: >60 ML/MIN/1.73M*2
EOSINOPHIL # BLD: 0.02 K/UL (ref 0.04–0.54)
EOSINOPHIL NFR BLD: 0.4 %
ERYTHROCYTE [DISTWIDTH] IN BLOOD BY AUTOMATED COUNT: 12.9 % (ref 11.6–14.4)
GLUCOSE SERPL-MCNC: 120 MG/DL (ref 70–99)
HCT VFR BLD AUTO: 36.7 % (ref 40.1–51)
HGB BLD-MCNC: 12.1 G/DL (ref 13.7–17.5)
IMM GRANULOCYTES # BLD AUTO: 0.02 K/UL (ref 0–0.08)
IMM GRANULOCYTES NFR BLD AUTO: 0.4 %
LYMPHOCYTES # BLD: 1.21 K/UL (ref 1.2–3.5)
LYMPHOCYTES NFR BLD: 22.2 %
MCH RBC QN AUTO: 32.3 PG (ref 28–33.2)
MCHC RBC AUTO-ENTMCNC: 33 G/DL (ref 32.2–36.5)
MCV RBC AUTO: 97.9 FL (ref 83–98)
MONOCYTES # BLD: 0.7 K/UL (ref 0.3–1)
MONOCYTES NFR BLD: 12.8 %
NEUTROPHILS # BLD: 3.5 K/UL (ref 1.7–7)
NEUTS SEG NFR BLD: 64 %
NRBC BLD-RTO: 0 %
PDW BLD AUTO: 10.9 FL (ref 9.4–12.4)
PLATELET # BLD AUTO: 184 K/UL (ref 150–350)
POTASSIUM SERPL-SCNC: 3.9 MEQ/L (ref 3.5–5.1)
PROT SERPL-MCNC: 6.4 G/DL (ref 6–8.2)
QRS DURATION: 82
QT INTERVAL: 356
QTC CALCULATION(BAZETT): 466
R AXIS: 53
RBC # BLD AUTO: 3.75 M/UL (ref 4.5–5.8)
SODIUM SERPL-SCNC: 138 MEQ/L (ref 136–145)
T WAVE AXIS: -48
TROPONIN I SERPL HS-MCNC: 4.1 PG/ML
VENTRICULAR RATE: 103
WBC # BLD AUTO: 5.46 K/UL (ref 3.8–10.5)

## 2024-09-16 PROCEDURE — 93010 ELECTROCARDIOGRAM REPORT: CPT | Performed by: INTERNAL MEDICINE

## 2024-09-16 PROCEDURE — 63700000 HC SELF-ADMINISTRABLE DRUG: Performed by: BEHAVIOR TECHNICIAN

## 2024-09-16 PROCEDURE — 80053 COMPREHEN METABOLIC PANEL: CPT | Performed by: BEHAVIOR TECHNICIAN

## 2024-09-16 PROCEDURE — 85025 COMPLETE CBC W/AUTO DIFF WBC: CPT | Performed by: EMERGENCY MEDICINE

## 2024-09-16 PROCEDURE — 36415 COLL VENOUS BLD VENIPUNCTURE: CPT | Performed by: EMERGENCY MEDICINE

## 2024-09-16 PROCEDURE — 99283 EMERGENCY DEPT VISIT LOW MDM: CPT

## 2024-09-16 PROCEDURE — 84484 ASSAY OF TROPONIN QUANT: CPT | Performed by: EMERGENCY MEDICINE

## 2024-09-16 PROCEDURE — 93005 ELECTROCARDIOGRAM TRACING: CPT | Performed by: EMERGENCY MEDICINE

## 2024-09-16 PROCEDURE — 93005 ELECTROCARDIOGRAM TRACING: CPT

## 2024-09-16 RX ORDER — AMIODARONE HYDROCHLORIDE 200 MG/1
200 TABLET ORAL DAILY
Qty: 30 TABLET | Refills: 0 | Status: SHIPPED | OUTPATIENT
Start: 2024-09-16 | End: 2024-10-16

## 2024-09-16 RX ORDER — METOPROLOL TARTRATE 25 MG/1
25 TABLET, FILM COATED ORAL ONCE
Status: COMPLETED | OUTPATIENT
Start: 2024-09-16 | End: 2024-09-16

## 2024-09-16 RX ADMIN — METOPROLOL TARTRATE 25 MG: 25 TABLET, FILM COATED ORAL at 16:58

## 2024-09-16 ASSESSMENT — ENCOUNTER SYMPTOMS
HEADACHES: 0
DYSURIA: 0
FATIGUE: 0
FEVER: 0
BACK PAIN: 0
CHILLS: 0
WEAKNESS: 0
ABDOMINAL PAIN: 0
CHEST TIGHTNESS: 0
HEMATOLOGIC/LYMPHATIC NEGATIVE: 1
FREQUENCY: 0
SHORTNESS OF BREATH: 0
DIFFICULTY URINATING: 0
MUSCULOSKELETAL NEGATIVE: 1
DIARRHEA: 0
VOMITING: 0
LIGHT-HEADEDNESS: 1
PSYCHIATRIC NEGATIVE: 1
COLOR CHANGE: 0
NAUSEA: 0
ENDOCRINE NEGATIVE: 1
PALPITATIONS: 0
DIZZINESS: 0

## 2024-09-16 NOTE — ED ATTESTATION NOTE
Procedures  Physical Exam  Review of Systems  Medical Decision Making      9/17/20241:01 PM  I have personally seen and examined the patient.  I personally performed the key components of the encounter and provided a substantive portion of the care and medical decision making for this patient. I reviewed and agree with the PA/NP/Resident's assessment and plan of care, with any exceptions as documented below    My focused history, examination, assessment, and plan of care of Jose Fuentes is as follows:  The history is provided by Patient  The patient is a 89 y.o. who comes to the ED for evaluation for atrial fibrillation, patient has intermittently throughout the day today had abnormal heart rhythms.  Patient denies any significant complaints, denies lightheadedness, denies chest pain at this time.  The episodes lasted approximately an hour.  Seem to improve when laying down.  Patient noted to have low blood pressure at home during one of the episodes.      Pertinent past medical history includes:   Past Medical History:   Diagnosis Date    A-fib (CMS/[facility])     CAD (coronary artery disease)     Hip fracture (CMS/[facility])     right    Hx of ventricular tachycardia     Prostate cancer (CMS/[facility])          Past Surgical History   Procedure Laterality Date    Cataract extraction Bilateral     Laminectomy      L3-L4       Exam: Well-appearing, systolic murmur noted, irregularly irregular rhythm      Impression/Plan/Medical decision making/ED course: 89-year-old presenting with irregularly irregular rhythm, concern for possibility of worsening atrial fibrillation especially with recent decrease in metoprolol, after discussion with cardiology, recommended change to amiodarone, patient started on amiodarone, subsequently discharged with plan for close follow-up with cardiology.  Patient otherwise with reassuring electrolytes making breakthrough atrial fibrillation related to decreased dosing loss likely source.  Patient  subsequently discharged.  They will return if things change.     After my initial evaluation, the patient requires testing, treatment and/or serial reevaluations to rule in or out the need for admission or emergency surgery.   The patient The patient was discharged after careful consideration for any admission needs    Medications   metoprolol tartrate (LOPRESSOR) tablet 25 mg (25 mg oral Given 9/16/24 0475)         ECG review: ECG read/interpreted by me: As Below   ECG 12 lead   Independent Interpretation by ED Provider   Atrial fibrillation, rate 103, no st elevation, non specific t wave changes          While here I have   Reviewed previous records in our system:  Reviewed prior outpatient and ER visits  Reviewed care everywhere for outside records: Reviewed prior outpatient and ER visits  Reviewed and interpreted lab results: agree with, independently interpreted, and reviewed below  Labs Reviewed   CBC AND DIFF - Abnormal       Result Value    WBC 5.46      RBC 3.75 (*)     Hemoglobin 12.1 (*)     Hematocrit 36.7 (*)     MCV 97.9      MCH 32.3      MCHC 33.0      RDW 12.9      Platelets 184      MPV 10.9      Differential Type Auto      nRBC 0.0      Immature Granulocytes 0.4      Neutrophils 64.0      Lymphocytes 22.2      Monocytes 12.8      Eosinophils 0.4      Basophils 0.2      Immature Granulocytes, Absolute 0.02      Neutrophils, Absolute 3.50      Lymphocytes, Absolute 1.21      Monocytes, Absolute 0.70      Eosinophils, Absolute 0.02 (*)     Basophils, Absolute 0.01     COMPREHENSIVE METABOLIC PANEL - Abnormal    Sodium 138      Potassium 3.9      Chloride 106      CO2 26      BUN 29 (*)     Creatinine 0.8      Glucose 120 (*)     Calcium 9.0      AST (SGOT) 12 (*)     ALT (SGPT) 10      Alkaline Phosphatase 79      Total Protein 6.4      Albumin 3.8      Bilirubin, Total 0.3      eGFR >60.0      Anion Gap 6     HIGH SENSITIVE TROPONIN I (BASELINE - REFLEX 2HR) - Normal    High Sens Troponin I 4.1        Reviewed and interpreted xrays, CT, MRI, US:  N/A  ECG 12 lead   ED Interpretation   Atrial fibrillation, rate 103, no st elevation, non specific t wave changes      Final Result        Compared lab results to previous lab results: .  Compared image results to previous results: N/A  Called and spoke with a consultant or physician about this case: As Above    Vital Signs Review: Vital signs have been reviewed. The oxygen saturation is SpO2: SpO2: 100 %   which is Normal      I was physically present for the key/critical portions of the following procedures: none     Jose Armando Beal MD  09/17/24 4129

## 2024-09-16 NOTE — ED PROVIDER NOTES
Dizziness  Associated symptoms: no chest pain, no diarrhea, no headaches, no nausea, no palpitations, no shortness of breath, no vomiting and no weakness    Weakness - Generalized  Associated symptoms: no abdominal pain, no chest pain, no diarrhea, no dizziness, no dysuria, no fever, no frequency, no headaches, no nausea, no shortness of breath and no vomiting        Chief Complaint   Patient presents with    Dizziness    Weakness - Generalized        HPI   89-year-old male with history of CAD, A-fib on Eliquis, prostate cancer, diabetes who presents for evaluation of A-fib. Daughter at bed side who is POA and supplements history. Patient reports that his watch notified him of being in atrial fibrillation multiple times today with heart rate ranging from 100-114bpm.  Yesterday patient was feeling fatigued with nausea and lightheadedness, feeling unsteady on his feet.  This morning patient had another episode of lightheadedness that resolved 1 hour later after laying down.  No syncope, loss of consciousness, fall.  Patient's daughter took his blood pressure and systolic was found to be in the 80s.  They called cardiology who recommended patient be evaluated in the emergency department.  Currently in ED patient offers no complaints.  Patient was evaluated by cardiology in June who lowered metoprolol to 12.5 mg twice daily due to low blood pressure.  Patient has been compliant with medication regimen.  Denies chest pain, shortness of breath, palpitations, leg swelling, fever, chills, abdominal pain, N/V, lightheadedness.      Past Medical History:   Diagnosis Date    A-fib (CMS/[facility])     CAD (coronary artery disease)     Hip fracture (CMS/HCC)     right    Hx of ventricular tachycardia     Prostate cancer (CMS/HCC)          Past Surgical History   Procedure Laterality Date    Cataract extraction Bilateral     Laminectomy      L3-L4       No family history on file.    Social History     Tobacco Use    Smoking status:  Never    Smokeless tobacco: Never   Vaping Use    Vaping status: Never Used   Substance Use Topics    Alcohol use: Yes     Comment: socially    Drug use: Never       Systems Reviewed from Nursing Triage:               Review of Systems   Constitutional:  Negative for chills, fatigue and fever.   HENT: Negative.     Respiratory:  Negative for chest tightness and shortness of breath.    Cardiovascular:  Negative for chest pain, palpitations and leg swelling.   Gastrointestinal:  Negative for abdominal pain, diarrhea, nausea and vomiting.   Endocrine: Negative.    Genitourinary:  Negative for difficulty urinating, dysuria and frequency.   Musculoskeletal: Negative.  Negative for back pain.   Skin:  Negative for color change and rash.   Neurological:  Positive for light-headedness. Negative for dizziness, weakness and headaches.   Hematological: Negative.    Psychiatric/Behavioral: Negative.     All other systems reviewed and are negative.           ED Triage Vitals [09/16/24 1449]   Temp Heart Rate Resp BP SpO2   36.5 °C (97.7 °F) 83 20 120/62 100 %      Temp Source Heart Rate Source Patient Position BP Location FiO2 (%) (Set)   Temporal -- -- -- --       Vitals:    09/16/24 1449 09/16/24 1656   BP: 120/62 (!) 134/56   Pulse: 83 89   Resp: 20 20   Temp: 36.5 °C (97.7 °F) 36.4 °C (97.6 °F)   TempSrc: Temporal Temporal   SpO2: 100% 94%   Weight: 81.6 kg (180 lb)    Height: 1.829 m (6')        Pulse Ox %: 100 % (09/16/24 1601)  Pulse Ox Interpretation: Normal (09/16/24 1601)  Heart Rate: 83 (09/16/24 1601)  Rhythm Strip Interpretation: Junctional Rhythm (09/16/24 1601)        Physical Exam  Vitals and nursing note reviewed.   Constitutional:       General: He is not in acute distress.     Appearance: Normal appearance. He is well-developed and well-groomed. He is not ill-appearing, toxic-appearing or diaphoretic.   HENT:      Head: Normocephalic and atraumatic.      Nose: Nose normal.   Cardiovascular:      Rate and  Rhythm: Normal rate. Rhythm irregularly irregular.      Pulses: Normal pulses.      Heart sounds: Normal heart sounds, S1 normal and S2 normal. No murmur heard.     No friction rub. No gallop.   Pulmonary:      Effort: Pulmonary effort is normal. No respiratory distress.      Breath sounds: Normal breath sounds and air entry. No decreased breath sounds, wheezing, rhonchi or rales.   Abdominal:      General: Abdomen is flat. There is no distension.      Palpations: Abdomen is soft.      Tenderness: There is no abdominal tenderness.   Musculoskeletal:         General: Normal range of motion.      Cervical back: Full passive range of motion without pain, normal range of motion and neck supple.      Right lower leg: No edema.      Left lower leg: No edema.   Lymphadenopathy:      Cervical: No cervical adenopathy.   Skin:     General: Skin is warm and dry.      Capillary Refill: Capillary refill takes less than 2 seconds.      Findings: No rash.   Neurological:      General: No focal deficit present.      Mental Status: He is alert and oriented to person, place, and time.      Gait: Gait is intact.   Psychiatric:         Behavior: Behavior is cooperative.              ECG 12 lead   ED Interpretation   Atrial fibrillation, rate 103, no st elevation, non specific t wave changes      Final Result          Labs Reviewed   CBC AND DIFF - Abnormal       Result Value    WBC 5.46      RBC 3.75 (*)     Hemoglobin 12.1 (*)     Hematocrit 36.7 (*)     MCV 97.9      MCH 32.3      MCHC 33.0      RDW 12.9      Platelets 184      MPV 10.9      Differential Type Auto      nRBC 0.0      Immature Granulocytes 0.4      Neutrophils 64.0      Lymphocytes 22.2      Monocytes 12.8      Eosinophils 0.4      Basophils 0.2      Immature Granulocytes, Absolute 0.02      Neutrophils, Absolute 3.50      Lymphocytes, Absolute 1.21      Monocytes, Absolute 0.70      Eosinophils, Absolute 0.02 (*)     Basophils, Absolute 0.01     COMPREHENSIVE  METABOLIC PANEL - Abnormal    Sodium 138      Potassium 3.9      Chloride 106      CO2 26      BUN 29 (*)     Creatinine 0.8      Glucose 120 (*)     Calcium 9.0      AST (SGOT) 12 (*)     ALT (SGPT) 10      Alkaline Phosphatase 79      Total Protein 6.4      Albumin 3.8      Bilirubin, Total 0.3      eGFR >60.0      Anion Gap 6     HIGH SENSITIVE TROPONIN I (BASELINE - REFLEX 2HR) - Normal    High Sens Troponin I 4.1     RAINBOW DRAW PANEL    Narrative:     The following orders were created for panel order [facility] [place] Draw Panel.  Procedure                               Abnormality         Status                     ---------                               -----------         ------                     RAINBOW RED[[id number]]                                                                 RAINBOW LT BLUE[[id number]]                                                             RAINBOW GOLD[[id number]]                                                                  Please view results for these tests on the individual orders.   RAINBOW RED   RAINBOW LT BLUE   RAINBOW GOLD       ECG 12 lead   ED Interpretation   Atrial fibrillation, rate 103, no st elevation, non specific t wave changes      Final Result            Procedures    Final diagnoses:   [I48.91] Atrial fibrillation with rapid ventricular response (CMS/HCC)       ED Course & MDM     ED Course as of 09/16/24 1727   Mon Sep 16, 2024   1617 High Sens Troponin I: 4.1 [EL]   1618 Hemoglobin(!): 12.1  Stable for patient [EL]   1643 Spoke with cardiology who recommended maintaining metoprolol 12.5 twice daily, Eliquis 5 mg daily, and adding amiodarone 200 mg. [EL]   1715 High Sens Troponin I: 4.1  Reflex discontinued [EL]   1726 Patient comfortable with outpatient follow up. Discussed precautions patient should return to the ED with if they were to occur. Patient verbalized understanding. All questions answered. Patient stable for discharge. [EL]      ED Course User  Index  [EL] Nessa Odell PA C           Medical Decision Making      5:27 PM      Impression/Medical Decision Makin-year-old male with history of CAD, A-fib on Eliquis, prostate cancer, diabetes who presents for evaluation of episodes of A-fib notified by his watch with lightheadedness yesterday and today.  Rate during episodes was 100 to 114 bpm.  Physical exam significant for irregularly irregular rhythm, otherwise normal.    Plan: CBC, CMP, troponin, EKG. EKG with rate of 113 bpm in atrial fibrillation, no ST elevation, nonspecific T wave changes.  Cardiology commended maintaining metoprolol and Eliquis doses and adding amiodarone 200 mg.  No leukocytosis or electrolyte abnormality.  Initial troponin 4.1, reflex discontinued. Pt agreeable with outpatient follow up with cardiology. Return precautions discussed. Stable for discharge.    Vital Signs Review: Vital signs have been reviewed. The oxygen saturation is  SpO2: 100 % which is normal.      MAITE Galvin  2024      We are in a period of time with increased volumes and decreased capacity.     This document was created using dragon dictation software.  There might be some typographical errors due to this technology.       Nessa Odell PA C  24 1699

## 2024-09-16 NOTE — DISCHARGE INSTRUCTIONS
You were evaluated in the Emergency Department today for atrial fibrillation. Your evaluation has shown no signs of medical conditions requiring emergent intervention at this time, however we recommend that you follow up with your primary care physician or your cardiologist as soon as possible for further management.    Please start taking Amiodarone 200mg daily. Please continue your other medication as prescribed.    Please schedule an appointment for follow up with your primary care physician as soon as possible.    Return to the Emergency Department if you experience worsening or uncontrolled chest pain, shortness of breath, light headedness, feeling faint, nausea, vomiting, or any other concerning symptoms.

## 2024-09-17 ENCOUNTER — CARE COORDINATION (OUTPATIENT)
Dept: OTHER | Facility: CLINIC | Age: 89
End: 2024-09-17

## 2024-09-24 ENCOUNTER — CARE COORDINATION (OUTPATIENT)
Dept: OTHER | Facility: CLINIC | Age: 89
End: 2024-09-24

## 2024-09-24 RX ORDER — AMIODARONE HYDROCHLORIDE 200 MG/1
200 TABLET ORAL DAILY
COMMUNITY
Start: 2024-09-16 | End: 2024-10-16

## 2024-09-24 RX ORDER — FLUTICASONE PROPIONATE 50 MCG
1 SPRAY, SUSPENSION (ML) NASAL DAILY
COMMUNITY

## 2024-09-24 SDOH — SOCIAL STABILITY: SOCIAL NETWORK: HOW OFTEN DO YOU GET TOGETHER WITH FRIENDS OR RELATIVES?: THREE TIMES A WEEK

## 2024-09-24 SDOH — HEALTH STABILITY: MENTAL HEALTH
STRESS IS WHEN SOMEONE FEELS TENSE, NERVOUS, ANXIOUS, OR CAN'T SLEEP AT NIGHT BECAUSE THEIR MIND IS TROUBLED. HOW STRESSED ARE YOU?: TO SOME EXTENT

## 2024-09-24 SDOH — SOCIAL STABILITY: SOCIAL NETWORK: ARE YOU MARRIED, WIDOWED, DIVORCED, SEPARATED, NEVER MARRIED, OR LIVING WITH A PARTNER?: SEPARATED

## 2024-09-24 SDOH — HEALTH STABILITY: MENTAL HEALTH: HOW MANY STANDARD DRINKS CONTAINING ALCOHOL DO YOU HAVE ON A TYPICAL DAY?: PATIENT DOES NOT DRINK

## 2024-09-24 SDOH — SOCIAL STABILITY: SOCIAL INSECURITY: WITHIN THE LAST YEAR, HAVE YOU BEEN HUMILIATED OR EMOTIONALLY ABUSED IN OTHER WAYS BY YOUR PARTNER OR EX-PARTNER?: NO

## 2024-09-24 SDOH — ECONOMIC STABILITY: FOOD INSECURITY: WITHIN THE PAST 12 MONTHS, THE FOOD YOU BOUGHT JUST DIDN'T LAST AND YOU DIDN'T HAVE MONEY TO GET MORE.: NEVER TRUE

## 2024-09-24 SDOH — ECONOMIC STABILITY: INCOME INSECURITY: IN THE LAST 12 MONTHS, WAS THERE A TIME WHEN YOU WERE NOT ABLE TO PAY THE MORTGAGE OR RENT ON TIME?: NO

## 2024-09-24 SDOH — HEALTH STABILITY: MENTAL HEALTH: HOW OFTEN DO YOU HAVE A DRINK CONTAINING ALCOHOL?: NEVER

## 2024-09-24 SDOH — HEALTH STABILITY: PHYSICAL HEALTH: ON AVERAGE, HOW MANY MINUTES DO YOU ENGAGE IN EXERCISE AT THIS LEVEL?: 20 MIN

## 2024-09-24 SDOH — ECONOMIC STABILITY: FOOD INSECURITY: WITHIN THE PAST 12 MONTHS, YOU WORRIED THAT YOUR FOOD WOULD RUN OUT BEFORE YOU GOT MONEY TO BUY MORE.: NEVER TRUE

## 2024-09-24 SDOH — SOCIAL STABILITY: SOCIAL INSECURITY: WITHIN THE LAST YEAR, HAVE YOU BEEN AFRAID OF YOUR PARTNER OR EX-PARTNER?: NO

## 2024-09-24 SDOH — HEALTH STABILITY: PHYSICAL HEALTH: ON AVERAGE, HOW MANY DAYS PER WEEK DO YOU ENGAGE IN MODERATE TO STRENUOUS EXERCISE (LIKE A BRISK WALK)?: 5 DAYS

## 2024-09-24 SDOH — SOCIAL STABILITY: SOCIAL NETWORK: HOW OFTEN DO YOU ATTEND CHURCH OR RELIGIOUS SERVICES?: 1 TO 4 TIMES PER YEAR

## 2024-09-24 SDOH — SOCIAL STABILITY: SOCIAL NETWORK: HOW OFTEN DO YOU ATTENT MEETINGS OF THE CLUB OR ORGANIZATION YOU BELONG TO?: 1 TO 4 TIMES PER YEAR

## 2024-09-24 SDOH — ECONOMIC STABILITY: INCOME INSECURITY: HOW HARD IS IT FOR YOU TO PAY FOR THE VERY BASICS LIKE FOOD, HOUSING, MEDICAL CARE, AND HEATING?: NOT HARD AT ALL

## 2024-09-25 ENCOUNTER — HOSPITAL ENCOUNTER (OUTPATIENT)
Dept: PHYSICAL THERAPY | Facility: REHABILITATION | Age: 89
Setting detail: THERAPIES SERIES
Discharge: HOME | End: 2024-09-25
Attending: SPECIALIST
Payer: MEDICARE

## 2024-09-25 DIAGNOSIS — S72.111A DISPLACED FRACTURE OF GREATER TROCHANTER OF RIGHT FEMUR, INITIAL ENCOUNTER FOR CLOSED FRACTURE (CMS/HCC): Primary | ICD-10-CM

## 2024-09-25 DIAGNOSIS — R26.2 DIFFICULTY IN WALKING: ICD-10-CM

## 2024-09-25 PROCEDURE — 97110 THERAPEUTIC EXERCISES: CPT | Mod: GP,CQ

## 2024-09-25 PROCEDURE — 97116 GAIT TRAINING THERAPY: CPT | Mod: GP,CQ

## 2024-09-25 PROCEDURE — 97112 NEUROMUSCULAR REEDUCATION: CPT | Mod: GP,CQ

## 2024-09-25 NOTE — OP PT TREATMENT LOG
ORTHO PT FLOWSHEET     Y/N Exercises Current Session Time     MODALITIES- HEAT/ICE  CPT 52926 TOTAL TIME FOR SESSION Not performed     Heat       Ice/Vasocompression       Mechanical Traction       THER ACT  CPT 27581 TOTAL TIME FOR SESSION Brief   n Pt Education Pt educated on progress note findings   n Assessment   Objective measures taken (PN) ROM, MMT, balance, gait, LEFS, 30s STS, 2mwt, TUG   y Pain, falls, meds and vitals Completed      THER EX  CPT 00226 TOTAL TIME FOR SESSION   10 minutes     CARDIOVASCULAR        HEP INSTRUCTION Instructed in primary HEP consisting of bridtes, bkfo, quad sets, glut sets.  Given exercise pro sheet and emailed to pts daughter.  See copy  below flow sheet  8/5/24 - added standing hip flex/ext/abd, heel raises,  to HEP see exercise pro sheet.     y NUSTEP seat #11 arms #12 Level 4 x 10 minutes      Stationary Bike       Elliptical       Treadmill       STRENGTHENING        n  n  n  n  n  n  n  n  n Supine ther-ex  Quad sets  Glut sets  Ball squeezes  Clamshells  SKFO   Marches  Bridges  SAQ  SLR       1 x 10 5 sec B   1 x 10 5 sec B  2 x 10 3 sec  3 x 10 3 sec green band  3 x 10 3 sec green band  3 x 10 3 sec green band  3 x 10 3 sec green band  3 x 10 3 sec B  2#   3 x 10  B  2#      n Seated ther-ex   Sit to stand    2 x 10      n  n  n  n      Standing ther-ex  Heel raises  Hip flex  Hip ext  Hip abd  Mini squats      2 x 10  1#       2 x 10  1#  2 x 10  1#  2 x 10  1#          STRETCHING           n    LE stretching  HS strap stretch  SAUMYA  Frog stretch    3 x 30 seconds  Held SAUMYA due to reports of pain with self stretch  3 x 30 sec     Other stretching       REPEATED MOVEMENTS       NEURO RE-ED  CPT 16565 TOTAL TIME FOR SESSION 33 minutes     COORDINATION        POSTURAL RE-ED           PRE-GAIT ACTIVITIES        BALANCE TRAINING        Sitting balance        y  y  y  y  y  y  y Standing balance  FOAM marching  Stepping on and off foam  SLS  FT EC on floor  Modified  tandem stance  Forward and backward walking  Side stepping     20 x cues for slow controlled movements  10 x each foot leading - multiple LOB due to retropulsion  3 x B about 3-4 sec max  2 x 30 sec B  3 x 30 sec B EO and EC  6 x  6 x each direction     GAIT TRAINING  CPT 29116 TOTAL TIME FOR SESSION 15 minutes      y    n    n Ambulation   Even terrain with trekking poles    Even terrain with SPC     Uneven terrain with SPC    - Practiced indoors on carpet with cues for consistent step lengths and R trekking pole forward more.  - Walked from ortho gym to neuro center stairs and back CS (gait belt on but not needed)  - Walked outside around [employer], up and down asphalt hills, onto pond deck (gait belt on but not needed)     Dynamic gait      n Stair negotiation 1 flight of steps with 1 railings and SPC CS (gait belt on but not needed)      Curb negotiation       Ramp negotiation       Outdoor ambulation       BWS/vector training       MANUAL  CPT 81353 TOTAL TIME FOR SESSION Not performed     Stretching 7/25/24 - Attempted gentle HS and gentle SAUMYA stretch, held due to pain     Mobilization        Massage       Taping         FEET TOGETHER EYES CLOSED    Standing in a corner, or somewhere you feel safe doing this, put your feet together and try to balance with yours eyes closed without using your arms for support. Repeat 2 Times   Hold 30 Seconds   Complete 1 Set          Staggered Stance    Stand with one foot in front of the other as shown. Try to maintain your balance in this position. Repeat 2 Times   Hold 30 Seconds   Complete 1 Set   Perform 1 Times a Day          march in place on foam    Focus on slow controlled movements and lifting thigh into a march Repeat 20 Times          Foam Balance Exercise    The patient should be positioned next to the bar standing on the foam pad with a slight separation of the feet. The patient then should try to remove their hands from the bar for a period of time depending  on their level of gait.    Progress to staggered foot position Repeat 2 Times   Hold 30 Seconds   Complete 1 Set   Perform 1 Times a Day          Side Stepping    Walk sideways along the  and forth for 30 seconds Duration 30 Seconds   Complete 1 Set   Perform 1 Times a Day          FORWARD AND BACKWARD WALKING    Walk forward and backward along the bar for 30 seconds or 5- 6 laps Repeat 5 Times   Complete 1 Set   Perform 1 Times a Day          Single leg Stance    Stand near a counter, railing or steady surface that you can use for support if needed. Balance on one leg, having your hands near a steady surface for safety if needed, however try not to use your hands to keep your balance. Perform on each leg Repeat 3 Times   Hold 10 Seconds

## 2024-09-25 NOTE — PROGRESS NOTES
"Physical Therapy Visit    PT DAILY NOTE FOR OUTPATIENT THERAPY    Patient: Jose Fuentes MRN: [id number]  : 1932 89 y.o.  Referring Physician: Sj Chan MD  Date of Visit: 2024    Certification Dates: 24 through 10/01/24    Diagnosis:   1. Displaced fracture of greater trochanter of right femur, initial encounter for closed fracture (CMS/[facility])    2. Difficulty in walking        Chief Complaints:  decreased ROM, strength, balance, and gait deviations.    Precautions:   Existing Precautions/Restrictions: cardiac  Precautions comments: CAD, tachycardia      TODAY'S VISIT    Time In Session:  Start Time: 1504  Stop Time: 1602  Time Calculation (min): 58 min   History/Vitals/Pain/Encounter Info - 24 1504          Injury History/Precautions/Daily Required Info    Document Type daily treatment     Primary Therapist Christine Mulvihill, PT     Chief Complaint/Reason for Visit  decreased ROM, strength, balance, and gait deviations.     Onset of Illness/Injury or Date of Surgery 24     Referring Physician Dr. Sj Chan     Existing Precautions/Restrictions cardiac     Precautions comments CAD, tachycardia     History of present illness/functional impairment Jose Fuentes is a 89 year old male who reports approximately 2 weeks ago he woke up during the middle of the night and the bed was spinning. He reports his daughter came and \"recalibrated his crystals\" by putting his head back and the vertigo resolved. The patient reports he had vertigo again the next evening with less intensity and resolved without treatment. He reports he had dizziness during the night 2 days ago. He reports he has dizziness when he lies on his back or rolls to either side however it occurs inconsistently. The patient denies a history of vertigo. He is currently sleeping with his head elevated. He sustained a R hip fracture on 24 from a fall and is currently receiving ortho treatment in PT at " [facility]. He is using a rollator walker.     Patient/Family/Caregiver Comments/Observations He was in the ER on 9/16/24 with an episode of a-fib. He was given and extra dose of metoprolol and within 2 hours he returned to sinus rhythm and has not had any issues since. Received clearance to return to PT today. He brought his trekking poles in to practice using them properly.     Patient reported fall since last visit No        Pain Assessment    Currently in pain No/Denies        Pain Intervention    Intervention  exercise     Post Intervention Comments no pain        Pre Activity Vital Signs    Pulse 74   regular rhythm    /50     BP Location Left upper arm     BP Method Manual     Patient Position Sitting         Services    Do You Speak a Language Other Than English at Home? no                    Daily Treatment Assessment and Plan - 09/25/24 1504          Daily Treatment Assessment and Plan    Progress toward goals Progressing     Daily Outcome Summary Michael arrived with clearance to return to PT after episode of a-fib last week that required ER visit. His BP was 120/50 HR 74 and regular. He arrived with B trekking poles with request to practice his technique so he can use them on his walks with his daughter Grant if he has to do unlevel surfaces. He did well with occasional cues for consistent step lengths and to not drag the R trekking pole. CS for gait training and for balance activities in the parallel bars.     Plan and Recommendations Discharge next visit. Will provide balance HEP to add to his strengthening program.                           Today's Treatment:    ORTHO PT FLOWSHEET     Y/N Exercises Current Session Time     MODALITIES- HEAT/ICE  CPT 54285 TOTAL TIME FOR SESSION Not performed     Heat       Ice/Vasocompression       Mechanical Traction       THER ACT  CPT 40006 TOTAL TIME FOR SESSION Brief   n Pt Education Pt educated on progress note findings   n Assessment   Objective measures  taken (PN) ROM, MMT, balance, gait, LEFS, 30s STS, 2mwt, TUG   y Pain, falls, meds and vitals Completed      THER EX  CPT 93648 TOTAL TIME FOR SESSION   10 minutes     CARDIOVASCULAR        HEP INSTRUCTION Instructed in primary HEP consisting of bridtes, bkfo, quad sets, glut sets.  Given exercise pro sheet and emailed to pts daughter.  See copy  below flow sheet  8/5/24 - added standing hip flex/ext/abd, heel raises,  to HEP see exercise pro sheet.     y NUSTEP seat #11 arms #12 Level 4 x 10 minutes      Stationary Bike       Elliptical       Treadmill       STRENGTHENING        n  n  n  n  n  n  n  n  n Supine ther-ex  Quad sets  Glut sets  Ball squeezes  [employer]  [employer]   [employer]  Bridges  SAQ  SLR       1 x 10 5 sec B   1 x 10 5 sec B  2 x 10 3 sec  3 x 10 3 sec green band  3 x 10 3 sec green band  3 x 10 3 sec green band  3 x 10 3 sec green band  3 x 10 3 sec B  2#   3 x 10  B  2#      n Seated ther-ex   Sit to stand    2 x 10      n  n  n  n      Standing ther-ex  Heel raises  Hip flex  Hip ext  Hip abd  Mini squats      2 x 10  1#       2 x 10  1#  2 x 10  1#  2 x 10  1#          STRETCHING           n    LE stretching  HS strap stretch  SAUMYA  Frog stretch    3 x 30 seconds  Held SAUMYA due to reports of pain with self stretch  3 x 30 sec     Other stretching       REPEATED MOVEMENTS       NEURO RE-ED  CPT 57388 TOTAL TIME FOR SESSION 33 minutes     COORDINATION        POSTURAL RE-ED           PRE-GAIT ACTIVITIES        BALANCE TRAINING        Sitting balance        y  y  y  y  y  y  y Standing balance  FOAM marching  Stepping on and off foam  SLS  FT EC on floor  Modified tandem stance  Forward and backward walking  Side stepping     20 x cues for slow controlled movements  10 x each foot leading - multiple LOB due to retropulsion  3 x B about 3-4 sec max  2 x 30 sec B  3 x 30 sec B EO and EC  6 x  6 x each direction     GAIT TRAINING  CPT 42168 TOTAL TIME FOR SESSION 15 minutes      y    n    n Ambulation    Even terrain with trekking poles    Even terrain with SPC     Uneven terrain with SPC    - Practiced indoors on carpet with cues for consistent step lengths and R trekking pole forward more.  - Walked from ortho gym to neuro center stairs and back CS (gait belt on but not needed)  - Walked outside around courtyard, up and down asphalt hills, onto pond deck (gait belt on but not needed)     Dynamic gait      n Stair negotiation 1 flight of steps with 1 railings and SPC CS (gait belt on but not needed)      Curb negotiation       Ramp negotiation       Outdoor ambulation       BWS/vector training       MANUAL  CPT 69408 TOTAL TIME FOR SESSION Not performed     Stretching 7/25/24 - Attempted gentle HS and gentle SAUMYA stretch, held due to pain     Mobilization        Massage       Taping

## 2024-09-26 NOTE — ADDENDUM NOTE
Encounter addended by: Roger Faust, PTA on: 9/26/2024 12:50 PM   Actions taken: Delete clinical note, Clinical Note Signed

## 2024-09-26 NOTE — ADDENDUM NOTE
Encounter addended by: Roger Faust, PTA on: 9/26/2024 12:49 PM   Actions taken: Clinical Note Signed

## 2024-09-26 NOTE — PROGRESS NOTES
"Physical Therapy Visit    PT DAILY NOTE FOR OUTPATIENT THERAPY    Patient: Jose Fuentes MRN: [id number]  : 1932 89 y.o.  Referring Physician: Sj Chan MD  Date of Visit: 2024    Certification Dates: 24 through 10/01/24    Diagnosis:   1. Displaced fracture of greater trochanter of right femur, initial encounter for closed fracture (CMS/[facility])    2. Difficulty in walking        Chief Complaints:  decreased ROM, strength, balance, and gait deviations.    Precautions:   Existing Precautions/Restrictions: cardiac  Precautions comments: CAD, tachycardia      TODAY'S VISIT    Time In Session:  Start Time: 1504  Stop Time: 1602  Time Calculation (min): 58 min   History/Vitals/Pain/Encounter Info - 24 1504          Injury History/Precautions/Daily Required Info    Document Type daily treatment     Primary Therapist Christine Mulvihill, PT     Chief Complaint/Reason for Visit  decreased ROM, strength, balance, and gait deviations.     Onset of Illness/Injury or Date of Surgery 24     Referring Physician Dr. Sj Chan     Existing Precautions/Restrictions cardiac     Precautions comments CAD, tachycardia     History of present illness/functional impairment Jose Fuentes is a 89 year old male who reports approximately 2 weeks ago he woke up during the middle of the night and the bed was spinning. He reports his daughter came and \"recalibrated his crystals\" by putting his head back and the vertigo resolved. The patient reports he had vertigo again the next evening with less intensity and resolved without treatment. He reports he had dizziness during the night 2 days ago. He reports he has dizziness when he lies on his back or rolls to either side however it occurs inconsistently. The patient denies a history of vertigo. He is currently sleeping with his head elevated. He sustained a R hip fracture on 24 from a fall and is currently receiving ortho treatment in PT at " [facility]. He is using a rollator walker.     Patient/Family/Caregiver Comments/Observations He was in the ER on 9/16/24 with an episode of a-fib. He was given and extra dose of metoprolol and within 2 hours he returned to sinus rhythm and has not had any issues since. Received clearance to return to PT today. He brought his trekking poles in to practice using them properly.     Patient reported fall since last visit No        Pain Assessment    Currently in pain No/Denies        Pain Intervention    Intervention  exercise     Post Intervention Comments no pain        Pre Activity Vital Signs    Pulse 74   regular rhythm    /50     BP Location Left upper arm     BP Method Manual     Patient Position Sitting         Services    Do You Speak a Language Other Than English at Home? no                    Daily Treatment Assessment and Plan - 09/25/24 1504          Daily Treatment Assessment and Plan    Progress toward goals Progressing     Daily Outcome Summary Michael arrived with clearance to return to PT after episode of a-fib last week that required ER visit. His BP was 120/50 HR 74 and regular. He arrived with B trekking poles with request to practice his technique so he can use them on his walks with his daughter Grant if he has to do unlevel surfaces. He did well with occasional cues for consistent step lengths and to not drag the R trekking pole. CS for gait training and for balance activities in the parallel bars.     Plan and Recommendations Discharge next visit. Will provide balance HEP to add to his strengthening program.                             Today's Treatment:    ORTHO PT FLOWSHEET     Y/N Exercises Current Session Time     MODALITIES- HEAT/ICE  CPT 57341 TOTAL TIME FOR SESSION Not performed     Heat       Ice/Vasocompression       Mechanical Traction       THER ACT  CPT 44393 TOTAL TIME FOR SESSION Brief   n Pt Education Pt educated on progress note findings   n Assessment   Objective  measures taken (PN) ROM, MMT, balance, gait, LEFS, 30s STS, 2mwt, TUG   y Pain, falls, meds and vitals Completed      THER EX  CPT 81674 TOTAL TIME FOR SESSION   10 minutes     CARDIOVASCULAR        HEP INSTRUCTION Instructed in primary HEP consisting of bridtes, bkfo, quad sets, glut sets.  Given exercise pro sheet and emailed to pts daughter.  See copy  below flow sheet  8/5/24 - added standing hip flex/ext/abd, heel raises,  to HEP see exercise pro sheet.     y NUSTEP seat #11 arms #12 Level 4 x 10 minutes      Stationary Bike       Elliptical       Treadmill       STRENGTHENING        n  n  n  n  n  n  n  n  n Supine ther-ex  Quad sets  Glut sets  Ball squeezes  [employer]  [employer]  Bridges  SAQ  SLR       1 x 10 5 sec B   1 x 10 5 sec B  2 x 10 3 sec  3 x 10 3 sec green band  3 x 10 3 sec green band  3 x 10 3 sec green band  3 x 10 3 sec green band  3 x 10 3 sec B  2#   3 x 10  B  2#      n Seated ther-ex   Sit to stand    2 x 10      n  n  n  n      Standing ther-ex  Heel raises  Hip flex  Hip ext  Hip abd  Mini squats      2 x 10  1#       2 x 10  1#  2 x 10  1#  2 x 10  1#          STRETCHING           n    LE stretching  HS strap stretch  SAUMYA  Frog stretch    3 x 30 seconds  Held SAUMYA due to reports of pain with self stretch  3 x 30 sec     Other stretching       REPEATED MOVEMENTS       NEURO RE-ED  CPT 88359 TOTAL TIME FOR SESSION 33 minutes     COORDINATION        POSTURAL RE-ED           PRE-GAIT ACTIVITIES        BALANCE TRAINING        Sitting balance        y  y  y  y  y  y  y Standing balance  FOAM marching  Stepping on and off foam  SLS  FT EC on floor  Modified tandem stance  Forward and backward walking  Side stepping     20 x cues for slow controlled movements  10 x each foot leading - multiple LOB due to retropulsion  3 x B about 3-4 sec max  2 x 30 sec B  3 x 30 sec B EO and EC  6 x  6 x each direction     GAIT TRAINING  CPT 40787 TOTAL TIME FOR SESSION 15 minutes      y    n    n  Ambulation   Even terrain with trekking poles    Even terrain with SPC     Uneven terrain with SPC    - Practiced indoors on carpet with cues for consistent step lengths and R trekking pole forward more.  - Walked from ortho gym to neuro center stairs and back CS (gait belt on but not needed)  - Walked outside around courtyard, up and down asphalt hills, onto pond deck (gait belt on but not needed)     Dynamic gait      n Stair negotiation 1 flight of steps with 1 railings and SPC CS (gait belt on but not needed)      Curb negotiation       Ramp negotiation       Outdoor ambulation       BWS/vector training       MANUAL  CPT 97268 TOTAL TIME FOR SESSION Not performed     Stretching 7/25/24 - Attempted gentle HS and gentle SAUMYA stretch, held due to pain     Mobilization        Massage       Taping         FEET TOGETHER EYES CLOSED    Standing in a corner, or somewhere you feel safe doing this, put your feet together and try to balance with yours eyes closed without using your arms for support. Repeat 2 Times   Hold 30 Seconds   Complete 1 Set          Staggered Stance    Stand with one foot in front of the other as shown. Try to maintain your balance in this position. Repeat 2 Times   Hold 30 Seconds   Complete 1 Set   Perform 1 Times a Day          march in place on foam    Focus on slow controlled movements and lifting thigh into a march Repeat 20 Times          Foam Balance Exercise    The patient should be positioned next to the bar standing on the foam pad with a slight separation of the feet. The patient then should try to remove their hands from the bar for a period of time depending on their level of gait.    Progress to staggered foot position Repeat 2 Times   Hold 30 Seconds   Complete 1 Set   Perform 1 Times a Day          Side Stepping    Walk sideways along the  and forth for 30 seconds Duration 30 Seconds   Complete 1 Set   Perform 1 Times a Day          FORWARD AND BACKWARD WALKING    Walk  forward and backward along the bar for 30 seconds or 5- 6 laps Repeat 5 Times   Complete 1 Set   Perform 1 Times a Day          Single leg Stance    Stand near a counter, railing or steady surface that you can use for support if needed. Balance on one leg, having your hands near a steady surface for safety if needed, however try not to use your hands to keep your balance. Perform on each leg Repeat 3 Times   Hold 10 Seconds

## 2024-09-27 ENCOUNTER — DOCUMENTATION (OUTPATIENT)
Dept: PHYSICAL THERAPY | Facility: REHABILITATION | Age: 89
End: 2024-09-27
Payer: MEDICARE

## 2024-09-27 NOTE — PROGRESS NOTES
Physical Therapy Discharge      PT DISCHARGE NOTE FOR OUTPATIENT THERAPY    Patient: Jose Fuentes MRN: [id number]  : 1932 89 y.o.  Referring Physician: No ref. provider found  Date of Visit: 2024      Certification Dates: 24 through 10/01/24    Total Visit Count: 17    Diagnosis: No diagnosis found.    Chief Complaints:  Chief Complaint   Patient presents with    Discharge From Treatment     No visit d/c.  Pt fell and is in ED - will get medical work up       Precautions:         No visit discharge.       Pt no showed for visit.  I spoke with his daughter Grant who stated that he is in the ED as he passed out and fell.  I discussed with Grant closing out this episode as POC ends 10/3/24.  When medically stable, and if deemed appropriate by MD, pt can return with new prescription for eval.  Pts daughter agreeable.       PT - 24 1439          Physical Therapy    Physical Therapy Specialty Ortho and Sports PT        PT Plan    Frequency of treatment 3 times/week     PT Duration 3 months     PT Cert From 24     PT Cert To 10/01/24     Date PT POC was sent to provider 24     Signed PT Plan of Care received?  Yes                       OBJECTIVE MEASUREMENTS/DATA:    None taken    ROM and MMT          2024    16:00 2024    15:00 2024   PT LE ROM Measurements   PROM: Right Hip Flexion 34 degrees       (-) SLR     AROM: Right Hip Flexion 95 degrees 108 degrees 125 degrees   PROM: Left Hip Flexion 38 degrees       SLR (-)     AROM: Left Hip Flexion 108 degrees     AROM: Right Hip ABD 15 degrees 28 degrees 28 degrees   AROM: Left Hip ABD 16 degrees     AROM: Right Hip ADD 0 degrees     AROM: Left Hip ADD 0 degrees     AROM: Right Knee Flexion 120 130 130   AROM: Left Knee Flexion 120     AROM: Right Knee Extension -8 0 0   AROM: Left Knee Extension -7     PT LE MMT   Right Hip Flexion  (4/5) good (4+/5) good plus   Left Hip Flexion  (4+/5) good plus (5/5) normal    Right Hip ABD  (4+/5) good plus (4+/5) good plus   Left Hip ABD  (5/5) normal (5/5) normal   Right Hip ADD  (4/5) good (4+/5) good plus   Left Hip ADD  (4+/5) good plus (5/5) normal   Right Knee Flexion  (4+/5) good plus (5/5) normal   Left Knee Flexion  (5/5) normal (5/5) normal   Right Knee Extension  (4+/5) good plus (5/5) normal   Left Knee Extension  (5/5) normal (5/5) normal     Outcome Measures          7/1/2024    14:49 7/29/2024    15:19 9/6/2024    14:55   PT OBJECTIVE Outcome Measures   2 Minute Walk Test 313 feet with SPC and close S 348 ft w/ SPC close S   (313 feet with SPC and close S) 405 feet no AD but close supervision w/ gait belt   30 Second Sit to Stand 4 repetitions       with use of B UEs and unable to perform repetitions past 25 seconds 6 repetitions 8 repetitions   TUG (Mean)  13.5 11.5   TUG - Results  performed with SPC with close S    PT SUBJECTIVE Outcome Measures   LEFS 26/80 =  33% functioning 33/80  =   41% functioning, 59% disabled 56/80 = 70% functioning   PSFS % Score 15 % 15 % 55 %       No visit d/c    Education provided:  None/NA         Goals Addressed                   This Visit's Progress     COMPLETED: Mutually agreed upon pain goal        Mutually agreed upon pain goal: Will be going on vacation and would like to be able to walk from [place] to [place]. (Ongoing)       COMPLETED: OP PT RIGHT HIP FRACTURE LONG TERM GOALS        Long term goals  - hip fracture  Long Term Goals Time Frame Result Comment/Progress   Pt will increase right LE  MMT into flexion, abduction, ER and IR >/= full grade 8-12 weeks progress    Pt with AROM of right hip to wfls to allow for return to all preexisting adls.  8-12 weeks progress    Pt with ability to ambulate moderate community distances  without AD or with B walking sticks with minimal difficulties and brief rest periods.   8-12 weeks progress    Increase LEFS >/= to 60-70% functioning to indicate overall  increase  functioning 8-12 weeks MET    Improve 2 MWT to 492 feet to meet age related norms to indicate increased endurance and gait speed. 8-12 weeks progress 405 feet   Increase TUG to 13.5 sec or greater  to decrease fall risk 8-12 weeks MET 11.5   Pt will be able to transfer sit <-> stand from a normal chair without use of B Ues to indicate overall improvement in LE strength 8-12 weeks Mostly met Medium chair   Pt will have complaints of 1/10 pain decreased by 75% or greater. 8-12 weeks MET    Pt will be able to ascend/descent 12 steps with reciprocal pattern with 1 railing 8-12 weeks MET    Pt will be able to ambulate with B walking sticks on uneven terrain without difficulties. 8-12 weeks progress           COMPLETED: OP PT RIGHT HIP SHORT TERM GOALS 2024        Short term goals {[facility] PT    Short Term Goals Time Frame Result Comment/Progress   TUG test  MET    Pt will increase R LE  MMT into flexion, abduction, ER and IR >/= ½ grade 4-6 weeks MET    Pt will increase right knee ROM >/= 10 degrees into flexion, extension to improve functional mobility 4-6 weeks MET    Pt will be able to ambulate short community distances with SPC with close S without difficulties. 4-6 weeks MET    Increase LEFS >/= 9 points to increase function   4-6 weeks MET    Pt will improve 2 MWT by 48 feet to meet MDC. 4-6 weeks MET    Increase TUG </= 13.5 sec to decrease fall risk 4-6 weeks MET    Pt will be able to ascend 6 steps with SPC and one railing forward with reciprocal pattern without noted difficulties. 4-6 weeks  MET    Pt will be able to safely ambulate household and limited community distances with SPC with modified I.    4-6 weeks MET    Pt with ability to transfer from a lower seat with use of one UE for assistance with minimal difficulties. 4-6 weeks MET    Pt will be independent with HEP to increase carryover at home 4-6 weeks MET

## 2024-09-30 ENCOUNTER — CARE COORDINATION (OUTPATIENT)
Dept: OTHER | Facility: CLINIC | Age: 89
End: 2024-09-30

## 2024-09-30 NOTE — CARE COORDINATION
Ambulatory Care Coordination Note     9/30/2024 1:52 PM     Patient outreach attempt by this ACM today to perform care management follow up . ACM was unable to reach the patient by telephone today; left voice message requesting a return phone call to this ACM.     ACM: Chioma Mcneil RN     Care Summary Note: HIPAA compliant voicemail left for patient to return ACM call.      PCP/Specialist follow up:      Follow-up Information         Follow up With Specialties Details Why Contact Info     [facility] Go on 10/3/2024   [address] [place] [address]  [phone]        Miquel-Briana Madden DPM [facility]  Go on 12/16/2024   [address] [facility], [address]   [place] [address] [phone] (Work)   [phone] (Fax)               Follow Up:   Plan for next ACM outreach in approximately 1 week to complete:  - goal progression  - education   Counseling referral .    Chioma Mcneil -[phone]

## 2024-10-03 ENCOUNTER — CARE COORDINATION (OUTPATIENT)
Dept: OTHER | Facility: CLINIC | Age: 89
End: 2024-10-03

## 2024-10-03 NOTE — CARE COORDINATION
Ambulatory Care Coordination Note     10/3/2024 10:08 AM     Patient Current Location:  [place]     ACM contacted the patient by telephone. Verified name and  with patient as identifiers.         ACM: Chioma Mcneil RN     Challenges to be reviewed by the provider   Additional needs identified to be addressed with provider Yes  PT-Patient would like to have PT and OT in the home. ACM contacted PCP for referral.               Method of communication with provider: phone.    Has the patient been seen in the ED since your last call? Yes,   Discharge Date: 24  Discharge Facility: [facility]   Reason for ED Visit: Fall with LOC  Visit Diagnosis: A-fib     Number of ED visits in the last 6 months: 3      Do you have any ongoing symptoms? Yes, my symptoms have improved.   Current symptoms: Weakness.    Did you call your PCP prior to going to the ED? No, did not call the PCP office.  Fall and loss of consciousness     Review of Discharge Instructions:   [x] AVS discharge instructions  [x] Right Care, Right Place, Right Time document  [x] Medication changes  [x] Follow up appointments  [x] Referral follow up   [x] Patient is following up with cardiologist today 10/03/24       Care Summary Note: ACM spoke with the patient and received permission to discuss his care with his daughter, Nomi. Nomi reported that the patient lost consciousness following a fall on 24 and was taken to the ED, where he was found to be in A-fib. The ED notes suggest the patient may have accidentally taken a double dose of metoprolol. He was discharged home without new orders. Patient is following up with cardiology today.  [facility] provided education to both the patient and Nomi on orthostatic hypotension, advising them to wait before sitting up from lying down and before walking after standing to regain balance. Both demonstrated understanding. Nomi inquired about qualifying the patient for in-home PT and

## 2024-10-10 ENCOUNTER — CARE COORDINATION (OUTPATIENT)
Dept: OTHER | Facility: CLINIC | Age: 89
End: 2024-10-10

## 2024-10-10 NOTE — CARE COORDINATION
Specialty Dept Phone    12/16/2024 1:45 PM Briana Molina DPM  [facility] [phone]           Follow-up Information       Follow up With Specialties Details Why Contact Info    Michel Pisano MD Urology Go on 10/31/2024  [facility] [facility]  4th Floor [facility]  [place] [address]  [phone]      Nahomy Muñoz MD Dermatology Go on 2/24/2025  [address] [place] [address]  [phone]      Jatinder Mendez MD Internal Medicine, Cardiology Go on 12/2/2024  [address] [facility].  [place] [address]  [phone]               Follow Up:   Plan for next ACM outreach in approximately 2 weeks to complete:  - goal progression  - education   - Falls? Episodes? How is PT going? .   Patient  is agreeable to this plan.     Chioma Mcneil, [phone]

## 2024-10-24 ENCOUNTER — CARE COORDINATION (OUTPATIENT)
Dept: OTHER | Facility: CLINIC | Age: 89
End: 2024-10-24

## 2024-10-24 NOTE — CARE COORDINATION
Ambulatory Care Coordination Note     10/24/2024 2:21 PM     Patient Current Location:  [place]     ACM contacted the patient by telephone. Verified name and  with patient as identifiers.         ACM: TOI MCKEON RN     Challenges to be reviewed by the provider   Additional needs identified to be addressed with provider No  none               Method of communication with provider: none.    Has the patient been seen in the ED since your last call? no    Care Summary Note: Follow up call to patient to assist assigned ACM. This ACM introduced self to patient. Patient stated that he has been feeling good. He continues with PT and states that he is in the second stage of therapy. He no longer uses a cane or walker and is learning how to use tracking sticks. Patient states that he has not had any falls and no changes to his medications. Patient also discussed that he has DM that he is managing with diet and exercise. Patient had no concerns or questions for ACM today. Patient is agreeable to ACM follow up.    Offered patient enrollment in the Remote Patient Monitoring (RPM) program for in-home monitoring: Patient is not eligible for RPM program because: affiliate provider.     Assessments Completed:   No changes since last call    Medications Reviewed:   Patient denies any changes with medications and reports taking all medications as prescribed.    Advance Care Planning:   Not reviewed during this call     Care Planning:   Not completed during this call    PCP/Specialist follow up:   Future Appointments         Provider Specialty Dept Phone    2024 1:45 PM Briana Molina DPM [facility] [phone]           Follow-up Information         Follow up With Specialties Details Why Contact Info     Michel Pisano MD Urology Go on 10/31/2024   [address] [place] [address]  [address] [facility] [address]  [phone]        Nahomy Muñoz MD Dermatology Go on 2025   [address] [facility]

## 2024-11-04 ENCOUNTER — CARE COORDINATION (OUTPATIENT)
Dept: OTHER | Facility: CLINIC | Age: 89
End: 2024-11-04

## 2024-11-04 PROBLEM — R32 INCONTINENCE: Status: ACTIVE | Noted: 2023-07-01

## 2024-11-04 PROBLEM — R65.10 SIRS (SYSTEMIC INFLAMMATORY RESPONSE SYNDROME) (HCC): Status: ACTIVE | Noted: 2023-11-01

## 2024-11-04 PROBLEM — D64.9 ANEMIA: Status: ACTIVE | Noted: 2024-11-04

## 2024-11-04 PROBLEM — E11.9 DIABETES MELLITUS TYPE 2, CONTROLLED, WITHOUT COMPLICATIONS (HCC): Status: ACTIVE | Noted: 2020-07-28

## 2024-11-04 PROBLEM — S72.001A CLOSED FRACTURE OF RIGHT HIP (HCC): Status: ACTIVE | Noted: 2024-05-08

## 2024-11-04 PROBLEM — C61 PROSTATE CA (HCC): Status: ACTIVE | Noted: 2023-07-01

## 2024-11-04 PROBLEM — B00.9 HERPES SIMPLEX: Status: ACTIVE | Noted: 2023-07-01

## 2024-11-04 PROBLEM — I48.91 ATRIAL FIBRILLATION, RAPID (HCC): Status: ACTIVE | Noted: 2024-11-04

## 2024-11-04 PROBLEM — R33.9 RETENTION OF URINE: Status: ACTIVE | Noted: 2023-11-01

## 2024-11-04 PROBLEM — I95.1 ORTHOSTASIS: Status: ACTIVE | Noted: 2024-05-11

## 2024-11-04 NOTE — CARE COORDINATION
Ambulatory Care Coordination Note     2024 12:18 PM     Patient Current Location:  [place]     ACM contacted the family by telephone. Verified name and  with family as identifiers.         ACM: Chioma Mcneil RN     Challenges to be reviewed by the provider   Additional needs identified to be addressed with provider No  none               Method of communication with provider: none.    Has the patient been seen in the ED since your last call? no    Care Summary Note: ACM spoke with the patient and obtained permission to discuss his care with his daughter, Nomi. The patient experienced another fall on 10/26/24, though he did not hit his head. He sustained some rib contusions but is currently in no distress. Nomi expressed interest in arranging home care for respite, and the ACM provided her with resources for home care, adult  options, and fall prevention education. The patient and his daughter have set a goal to lower his HbA1c, and he has been eating healthier meals. Both the patient and Nomi are agreeable to follow-up outreach, which the ACM will conduct in two weeks.    Offered patient enrollment in the Remote Patient Monitoring (RPM) program for in-home monitoring: Patient is not eligible for RPM program because: affiliate provider.     Assessments Completed:   Care Coordination Interventions    Referral from Primary Care Provider: No  Suggested Interventions and Community Resources  Adult Day Program: In Process  Behavorial Health: Declined  Fall Risk Prevention: In Process  Home Health Services: Declined  Medi Set or Pill Pack: Not Started  Pharmacist: Declined  Physical Therapy: Completed  Senior Services: In Process      ,   Diabetes Assessment    Medic Alert ID: No  Meal Planning: Other   How often do you test your blood sugar?: Daily   Do you have barriers with adherence to non-pharmacologic self-management interventions? (Nutrition/Exercise/Self-Monitoring): No   Have you

## 2024-11-19 ENCOUNTER — CARE COORDINATION (OUTPATIENT)
Dept: OTHER | Facility: CLINIC | Age: 89
End: 2024-11-19

## 2024-11-19 NOTE — CARE COORDINATION
Ambulatory Care Coordination Note     11/19/2024 12:26 PM     Patient and Family, Nomi  outreach attempt by this AC today to perform care management follow up . ACM was unable to reach the patient by telephone today;   left voice message requesting a return phone call to this ACM.     ACM: Chioma Mcneil RN     Care Summary Note: HIPAA compliant voicemail left to return ACM call.     PCP/Specialist follow up:   Future Appointments         Provider Specialty Dept Phone    12/16/2024 1:45 PM [facility] Main Line   Briana Molina DPM    Podiatry [phone]            Follow Up:   Plan for next AC outreach in approximately 1 week to complete:  - goal progression  - education   - follow-up appointment with Dermatology and Urology .         Chioma Mcneil -[phone]

## 2024-11-20 ENCOUNTER — CARE COORDINATION (OUTPATIENT)
Dept: OTHER | Facility: CLINIC | Age: 89
End: 2024-11-20

## 2024-11-20 NOTE — CARE COORDINATION
Ambulatory Care Coordination Note     2024 9:53 AM     Patient Current Location:  [place]     Nomi Cartagena  contacted the [facility] by telephone. Verified name and  with family as identifiers.         ACM: Chioma Mcneil RN     Challenges to be reviewed by the provider   Additional needs identified to be addressed with provider No  none               Method of communication with provider: none.    Utilization: Patient has not had any utilization since our last call.     Care Summary Note:  [facility] spoke with the patient and obtained consent to discuss his care with his daughter, Nomi. Nomi reported that the patient has been actively managing his A1C through dietary changes, resulting in well-controlled blood sugars. 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 Nomi also noted that the patient has not experienced any recent falls and is overall doing well. A home health aide is scheduled to begin providing respite care, which the family is looking forward to as it will help alleviate caregiver strain. The patient is currently in no acute distress. The [facility] plans to follow up with the patient in two weeks, and both the patient and Nomi agreed to this timeline.    Offered patient enrollment in the Remote Patient Monitoring (RPM) program for in-home monitoring: Patient is not eligible for RPM program because: affiliate provider.     Assessments Completed:   Care Coordination Interventions    Referral from Primary Care Provider: No  Suggested Interventions and Community Resources  Adult Day Program: In Process  Behavorial Health: Declined  Fall Risk Prevention: Completed  Home Health Services: Completed  Medi Set or Pill Pack: Not Started  Pharmacist: Declined  Physical Therapy: Completed  Senior Services: Completed      ,   Diabetes Assessment    Medic Alert ID: No  Meal Planning: Other   How often do

## 2024-12-04 ENCOUNTER — CARE COORDINATION (OUTPATIENT)
Dept: OTHER | Facility: CLINIC | Age: 89
End: 2024-12-04

## 2024-12-04 NOTE — CARE COORDINATION
Ambulatory Care Coordination Note     12/4/2024 1:59 PM     Nomi Cartagena  outreach attempt by this ACM today to perform care management follow up . ACM was unable to reach the patient by telephone today;   left voice message requesting a return phone call to this ACM.     ACM: Chioma Mcneil RN     Care Summary Note: HIPAA compliant voicemail left for daughter.    PCP/Specialist follow up:   Future Appointments         Provider Specialty Dept Phone    12/16/2024 1:45 PM PROVIDER, OTHER              Follow Up:   Plan for next ACM outreach in approximately 1 week to complete:  - goal progression  - education .      Chioma Mcneil -[phone]

## 2024-12-11 ENCOUNTER — CARE COORDINATION (OUTPATIENT)
Dept: OTHER | Facility: CLINIC | Age: 89
End: 2024-12-11

## 2024-12-11 NOTE — CARE COORDINATION
Ambulatory Care Coordination Note     12/11/2024 12:44 PM     Nomi Meraz  outreach attempt by this ACM today to perform care management follow up . ACM was unable to reach the Nomi meraz  by telephone today;   left voice message requesting a return phone call to this ACM.     ACM: Chioma Mcneil RN     Care Summary Note: HIPAA compliant voicemail left for daughterNomi.    PCP/Specialist follow up:   Future Appointments         Provider Specialty Dept Phone    12/16/2024 1:45 PM PROVIDER, OTHER              Follow Up:   Plan for next ACM outreach in approximately 1 week to complete:  - goal progression  - education .      Chioma Mcneil -[phone]

## 2024-12-19 ENCOUNTER — CARE COORDINATION (OUTPATIENT)
Dept: OTHER | Facility: CLINIC | Age: 89
End: 2024-12-19

## 2024-12-19 NOTE — CARE COORDINATION
Ambulatory Care Coordination Note     12/19/2024 1:08 PM     Nomi Cartagena  outreach attempt by this AC today to perform care management follow up . ACM was unable to reach the patient by telephone today;   left voice message requesting a return phone call to this ACM.  Alorumt message sent requesting family to contact this ACM.     ACM: Chioma Mcneil RN     Care Summary Note: HIPAA compliant voicemail left for Nomi rider    Follow Up:   Plan for next AC outreach in approximately 2 weeks to complete:  - goal progression  - education   Urology changes .         Chioma Mcneil, ROGELIO [phone]

## 2024-12-20 ENCOUNTER — CARE COORDINATION (OUTPATIENT)
Dept: OTHER | Facility: CLINIC | Age: 89
End: 2024-12-20

## 2024-12-20 NOTE — CARE COORDINATION
Ambulatory Care Coordination Note     2024 10:14 AM     Patient Current Location:  [place]     Nomi Cartagena  contacted the [facility] by telephone. Verified name and  with patient as identifiers.         ACM: hCioma Mcneil RN     Challenges to be reviewed by the provider   Additional needs identified to be addressed with provider No  none               Method of communication with provider: phone.    Utilization: Patient has not had any utilization since our last call.     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 At this time, it is unclear whether the patient will be admitted postoperatively, remain in observation, or be discharged home. Nomi stated that the patient is expected to have an internal urinary catheter in place for two weeks and expressed a desire for a home health nurse to assist with care during this period. [facility] strongly recommended that Nomi speak with the urologist following the surgery to formally request home health services, particularly given the patient’s history of UTIs, high risk for infection, and Nomi’s lack of formal training in managing Khanna catheters. Nomi acknowledged this recommendation and stated she would address it with the urologist today. [facility] plans to follow up with the patient and family on the next business day to assess postoperative needs, provide education, and offer additional support. Nomi agreed to this plan.    Offered patient enrollment in the Remote Patient Monitoring (RPM) program for in-home monitoring: Patient is not eligible for RPM program because: affiliate provider.     Assessments Completed:   General Assessment    Do you have any symptoms that are causing concern?: No          Medications Reviewed:   Patient denies any changes with medications and

## 2024-12-23 ENCOUNTER — CARE COORDINATION (OUTPATIENT)
Dept: OTHER | Facility: CLINIC | Age: 89
End: 2024-12-23

## 2024-12-23 NOTE — CARE COORDINATION
Ambulatory Care Coordination Note     2024 11:57 AM     Patient Current Location:  [place]     ACM contacted the caregiver by telephone. Verified name and  with caregiver as identifiers.         ACM: Chioma Mcneil RN     Challenges to be reviewed by the provider   Additional needs identified to be addressed with provider Yes  ACM contacted the patient's PCP and urologist to request orders for home skilled nursing services. This request was based on the patient's prolonged use of a Khanna catheter for three weeks, placing them at high risk for urinary tract infections. Additionally, the patient is dependent on assistance for activities of daily living , and the family lacks prior experience with Khanna catheter care. The ACM emphasized the importance of skilled nursing support to address these concerns and ensure proper management and care.               Method of communication with provider: phone.    Utilization: Patient has not had any utilization since our last call.     Care Summary Note: ACM spoke with the patient’s POA and primary caregiver, Nomi, regarding the patient’s recent outpatient RUG surgery performed on 24, during which a Khanna catheter was placed. The catheter is to remain in place until 25. Nomi expressed concerns about their lack of knowledge and training in managing Khanna catheter care and requested skilled home nursing support. Considering the patient’s medical history, including recurrent UTIs, fall risk, advanced age, and dependence on assistance for activities of daily living, the ACM agrees with the need for skilled nursing services. Accordingly, the ACM contacted both the patient’s urologist and primary care provider to request orders for skilled home health care and requested that they communicate directly with Nomi for any questions or clarification. Additionally, the ACM educated Nomi on critical care topics, including recognizing signs and symptoms

## 2024-12-27 ENCOUNTER — CARE COORDINATION (OUTPATIENT)
Dept: OTHER | Facility: CLINIC | Age: 89
End: 2024-12-27

## 2024-12-27 NOTE — CARE COORDINATION
Ambulatory Care Coordination Note     2024 8:58 AM     Patient Current Location:  [place]     Nomi Cartagnea POA/Daughter  contacted the ACM by telephone. Verified name and  with family as identifiers.         ACM: Chioma Mcneil RN     Challenges to be reviewed by the provider   Additional needs identified to be addressed with provider Yes  home health care-Patient needs a home health visit today due to the caregiver being unwell. Additionally, the patient has a reddened area on the coccyx. The ACM has reported this to [facility], and it will be addressed and assessed during RN visits scheduled for later today. [facility] also contacted [facility] requesting a home health aid to come out today.               Method of communication with provider: phone.    Utilization: Has the patient been seen in the ED since your last call? Yes,   Discharge Date: 24  Discharge Facility:  [facility]   Reason for ED Visit: Fall  Visit Diagnosis: Syncope;Fall    Number of ED visits in the last 6 months: 2      Do you have any ongoing symptoms? No  Did you call your PCP prior to going to the ED? No, did not call the PCP office.     Review of Discharge Instructions:   [x] AVS discharge instructions  [x] Right Care, Right Place, Right Time document  [x] Medication changes  [x] Follow up appointments  [x] Referral follow up     Care Summary Note:The patient’s caregiver, POA/daughter contacted the ACM to report that she had taken the patient to the ED on 24 due to a fall. The ED evaluated the patient, found no significant injuries, and discharged him home. The caregiver believes she may have contracted COVID-19 or another virus while in the ED, as she is now experiencing gastrointestinal issues and body aches. She expressed worry about her ability to assist the patient today, including getting him downstairs or providing the necessary care. Additionally, the caregiver noted

## 2025-01-07 ENCOUNTER — CARE COORDINATION (OUTPATIENT)
Dept: OTHER | Facility: CLINIC | Age: 89
End: 2025-01-07

## 2025-01-07 NOTE — CARE COORDINATION
Ambulatory Care Coordination Note     2025 11:07 AM     Patient Current Location:  [facility] contacted the caregiver by telephone. Verified name and  with caregiver as identifiers.       ACM: Chioma Mcneil RN     Challenges to be reviewed by the provider   Additional needs identified to be addressed with provider No  none               Method of communication with provider: none.    Utilization: Patient has not had any utilization since our last call.     Care Summary Note: [facility] spoke with Nomi, the patient’s daughter, caregiver, and POA, who expressed gratitude for the [facility]’s advocacy in securing skilled home nursing services for Khanna care. Nomi reported that the patient has also initiated at-home lymphedema therapy and noted that there has been no hospital utilization since the last call. According to the skilled nurse's assessment yesterday, the patient’s surgical incisions are well-approximated, with no signs or symptoms of infection. The patient’s urine is described as clear, pale, and without odor. Nomi further reported that the patient’s blood sugars are consistently within range, remaining below 130. She shared that the patient is in no acute distress and is doing well overall. The patient has upcoming appointments with both the cardiologist and urologist, for which Nomi will be providing transportation. At this time, Nomi expressed no additional needs. The [facility] will follow up in two weeks after the scheduled appointments, and Nomi is agreeable to this outreach.    Offered patient enrollment in the Remote Patient Monitoring (RPM) program for in-home monitoring: Patient is not eligible for RPM program because: affiliate provider.     Assessments Completed:   Diabetes Assessment    Medic Alert ID: No  Meal Planning: Other   How often do you test your blood sugar?: Daily (Comment: 130s)   Do you have barriers with adherence to non-pharmacologic self-management interventions?

## 2025-01-08 ENCOUNTER — CARE COORDINATION (OUTPATIENT)
Dept: OTHER | Facility: CLINIC | Age: 89
End: 2025-01-08

## 2025-01-08 NOTE — CARE COORDINATION
Specialties Details Why Contact Info     Dia Hernandez MD Cardiology Go on 1/9/2025   [address]  [facility] [address]  [phone]        Michel Pisano MD Urology Go on 1/14/2025   [address] [facility]  4th [address] [facility] [address]  [phone]        Nahomy Muñoz MD Dermatology Go on 2/24/2025   [address] [facility] [address]  [phone]       Follow Up:   Plan for next ACM outreach in approximately 2 weeks to complete:  - advance care planning   - goal progression  - Changes made at urology and cardiology appointment. .   Caregiver is agreeable to this plan.     Chioma Mcneil, [phone]

## 2025-01-21 ENCOUNTER — CARE COORDINATION (OUTPATIENT)
Dept: OTHER | Facility: CLINIC | Age: 89
End: 2025-01-21

## 2025-01-21 NOTE — CARE COORDINATION
Ambulatory Care Coordination Note     1/21/2025 4:22 PM     Nomi Cartagena  outreach attempt by this AC today to perform care management follow up . AC was unable to reach the Nomi cartagena  by telephone today;   left voice message requesting a return phone call to this ACM.     ACM: Chioma Mcneil RN     Care Summary Note: ACM left HIPAA compliant message for Nomi cartagena, asking for a return call.    PCP/Specialist follow up:     Follow-up Information         Follow up With Specialties Details Why Contact Info     Dia Hernandez MD Cardiology Go on 1/9/2025   [address] [facility] [address]  [phone]        Michel Pisano MD Urology Go on 1/14/2025   [address] [facility]  4th Floor [facility] [address]  [phone]        Nahomy Muñoz MD Dermatology Go on 2/24/2025   [address] [facility] [address]  [phone]       Follow Up:   Plan for next AC outreach in approximately 1 week to complete:  - goal progression  - education .      Chioma Mcneil -[phone]

## 2025-01-22 ENCOUNTER — CARE COORDINATION (OUTPATIENT)
Dept: OTHER | Facility: CLINIC | Age: 89
End: 2025-01-22

## 2025-01-22 NOTE — CARE COORDINATION
Tools: Completed (Comment: memory care activities, education, and tip.)      ,   Diabetes Assessment    Medic Alert ID: No  Meal Planning: Other   How often do you test your blood sugar?: Daily (Comment: 120s)   Do you have barriers with adherence to non-pharmacologic self-management interventions? (Nutrition/Exercise/Self-Monitoring): No   Have you ever had to go to the ED for symptoms of low blood sugar?: No       No patient-reported symptoms         ,   Hypertension - Encounter Level    Symptom course: stable      ,   General Assessment    Do you have any symptoms that are causing concern?: No          Medications Reviewed:   Patient denies any changes with medications and reports taking all medications as prescribed. and Completed during a previous call     Advance Care Planning:   Reviewed during previous call      Care Planning:   Education Documentation  Home Health Care Services, taught by Chioma Mcneil RN at 1/22/2025  8:29 AM.  Learner: Patient  Readiness: Acceptance  Method: Explanation  Response: Verbalizes Understanding    Follow-up Appointments, taught by Chioma Mcneil RN at 1/22/2025  8:29 AM.  Learner: Patient  Readiness: Acceptance  Method: Explanation  Response: Verbalizes Understanding    When to Call the Doctor, taught by Chioma Mcneil RN at 1/22/2025  8:29 AM.  Learner: Patient  Readiness: Acceptance  Method: Explanation  Response: Verbalizes Understanding    Fall Prevention, taught by Chioma Mcneil RN at 1/22/2025  8:29 AM.  Learner: Patient  Readiness: Acceptance  Method: Explanation  Response: Verbalizes Understanding    Education Comments  No comments found.     ,    Goals Addressed                   This Visit's Progress     Conditions and Symptoms   On track     I will schedule office visits, as directed by my provider.  I will keep my appointment or reschedule if I have to cancel.  I will notify my provider of any barriers to my plan of care.  I will follow my

## 2025-02-06 ENCOUNTER — CARE COORDINATION (OUTPATIENT)
Dept: OTHER | Facility: CLINIC | Age: 89
End: 2025-02-06

## 2025-02-06 NOTE — CARE COORDINATION
Ambulatory Care Coordination Note     2025 1:45 PM     Patient Current Location:  [place]     Nomi Cartagena POA/daughter/caregiver  contacted the ACM by telephone. Verified name and  with family as identifiers.         ACM: Chioma Mcneil RN     Challenges to be reviewed by the provider   Additional needs identified to be addressed with provider No  none               Method of communication with provider: none.    Utilization: Patient has not had any utilization since our last call.     Care Summary Note: Patient's daughter/POA/caregiver contacted ACM to discuss potential options for skilled nursing and assisted living. She expressed concern regarding the patient’s desire to live independently, noting that while he has voiced this preference, the family does not believe it would be a safe option for him. She is uncertain whether this is a temporary sentiment due to a difficult day or if he genuinely wishes to explore alternative living arrangements. The family plans to take the patient on a tour of an assisted living facility in the near future. The ACM provided a detailed explanation of the differences between skilled nursing facilities and assisted living, clarifying that SNFs are typically short-term and often require a three-night hospital admission for Medicare coverage, although waivers may be available. The daughter stated that any arrangements they pursue will be paid for out of pocket. The patient is currently receiving home health services, including physical therapy, occupational therapy , skilled nursing, and assistance from a home health aide. He is also continuing counseling sessions via telehealth. At the time of the conversation, the patient was not in any distress. The ACM will follow up with the patient in two weeks to reassess his needs and provide further support as necessary.    Offered patient enrollment in the Remote Patient Monitoring (RPM) program for in-home monitoring:

## 2025-02-20 ENCOUNTER — CARE COORDINATION (OUTPATIENT)
Dept: OTHER | Facility: CLINIC | Age: 89
End: 2025-02-20

## 2025-02-20 NOTE — CARE COORDINATION
Ambulatory Care Coordination Note     2025 10:05 AM     Patient Current Location:  [place]     , Nomi/POA/Caregiver  contacted the ACM by telephone. Verified name and  with patient as identifiers.       ACM: Chioma Mcneil RN     Challenges to be reviewed by the provider   Additional needs identified to be addressed with provider Yes  DME-Patient needs a prior Authorization for Dexcom G6  SN-Patient needs skilled home health order faced to [facility] for indwelling colbert catheter change every 4 to 6 weeks.               Method of communication with provider: phone.    Utilization: Patient has not had any utilization since our last call.     Care Summary Note: Nomi, the patient’s daughter, POA, and caregiver, contacted the ACM to provide an update on her father’s condition. She reported that he is doing well and has successfully lowered his Hemoglobin A1C from 7.2 to 6.3. The patient has an indwelling Colbert catheter that requires changing every 4 to 6 weeks; however, due to his homebound status and inability to travel independently, he is unable to visit his urologist’s office every 4 weeks as recommended. To address this, the ACM assisted in obtaining orders from the urologist for home health skilled nursing visits to perform the catheter changes. Additionally, the patient has an order for a Dexcom G6, but insurance has denied coverage. At the daughter’s request, the ACM contacted the PCP’s office to initiate a prior authorization; however, the patient does not meet the criteria for approval, and the PCP’s office will inform the daughter of this decision. The patient is continuing with physical therapy and remains on a waitlist for speech therapy. His previous skin breakdown has healed completely, and he has no open areas. He is in no acute distress. The daughter has also spoken with a  regarding assisted living facility costs, independent living, and continuing care

## 2025-03-12 ENCOUNTER — CARE COORDINATION (OUTPATIENT)
Dept: OTHER | Facility: CLINIC | Age: 89
End: 2025-03-12

## 2025-03-12 NOTE — CARE COORDINATION
Ambulatory Care Coordination Note     3/12/2025 12:50 PM     Patient Current Location:  [place]     ACM contacted the family by telephone. Verified name and  with family as identifiers.         ACM: Chioma Mcneil RN     Challenges to be reviewed by the provider   Additional needs identified to be addressed with provider No  none               Method of communication with provider: none.    Utilization: Patient has not had any utilization since our last call.     Care Summary Note: M spoke with the patient's daughter, MOISES, and caregiver, Nomi, who reports that the patient is doing well. While Home Health services have ended, the patient continues to receive respite care. The patient has an indwelling Khanna catheter, and although [facility] attempted to arrange Home Health for catheter changes, the urologist requires them to be done in-office every 4 to 6 weeks. Two weeks ago, the patient experienced a syncope episode after overexerting himself. The daughter reported the incident to the patient’s providers, noting that she and the patient’s grandson were present to assist, preventing a fall by lowering him onto a bench. EMS was called, but the patient, who was alert and oriented ×3 with stable vitals and a normal EKG, declined transport to the hospital. No further episodes have occurred since. [facility] provided education on red zone symptoms and supplied DME information for Khanna catheter supplies through [address] [place] [address] ([phone]). The patient is now attending outpatient physical therapy and remains on the waitlist for speech therapy. [facility] reviewed upcoming appointments, confirmed that the patient is in no acute distress, and will follow up with the daughter in three weeks to reassess needs and determine possible graduation from [facility]. The daughter is agreeable to this plan.      Offered patient enrollment in the Remote Patient Monitoring (RPM) program for in-home monitoring: Patient is not eligible for RPM

## 2025-03-31 ENCOUNTER — CARE COORDINATION (OUTPATIENT)
Dept: OTHER | Facility: CLINIC | Age: 89
End: 2025-03-31

## 2025-03-31 NOTE — CARE COORDINATION
Ambulatory Care Coordination Note     3/31/2025 12:11 PM     Family, POA Nomi  outreach attempt by this ACM today to perform care management follow up . ACM was unable to reach the Nomi meraz  by telephone today;   left voice message requesting a return phone call to this ACM.     ACM: Chioma Mcneil RN    Follow Up:   Plan for next ACM outreach in approximately 1 week to complete:  - goal progression  - education .     Chioma Mcneil, [phone]

## 2025-04-03 ENCOUNTER — CARE COORDINATION (OUTPATIENT)
Dept: OTHER | Facility: CLINIC | Age: 89
End: 2025-04-03

## 2025-04-03 NOTE — CARE COORDINATION
Ambulatory Care Coordination Note     4/3/2025 9:15 AM     Nomi eMraz  outreach attempt by this ACM today to perform care management follow up . ACM was unable to reach the Nomi meraz  by telephone today;   left voice message requesting a return phone call to this ACM.     ACM: Chioma Mcneil RN       Follow Up:   Plan for next ACM outreach in approximately 2 weeks to complete:  - goal progression  - education .      Chioma Mcneil, [phone]

## 2025-04-17 ENCOUNTER — CARE COORDINATION (OUTPATIENT)
Dept: OTHER | Facility: CLINIC | Age: 89
End: 2025-04-17

## 2025-04-17 RX ORDER — CIPROFLOXACIN 500 MG/1
500 TABLET, FILM COATED ORAL 2 TIMES DAILY
COMMUNITY
Start: 2024-12-25

## 2025-04-17 NOTE — CARE COORDINATION
Ambulatory Care Coordination Note     2025 10:44 AM     Patient Current Location:  [place]     ACM contacted the caregiver by telephone. Verified name and  with caregiver as identifiers.     Patient graduated from the High Risk Care Management program on 2025.  Caregiver verbalizes confidence in the ability to self-manage at this time..  Care management goals have been completed. No further Ambulatory Care Manager follow up scheduled.      ACM: Chioma Mcneil RN     Challenges to be reviewed by the provider   Additional needs identified to be addressed with provider No  none               Method of communication with provider: none.    Utilization: Has the patient been seen in the ED since your last call? Yes,   Discharge Date: 25  Discharge Facility: [facility]    Reason for ED Visit: Blood in Urine   Visit Diagnosis: UTI    Number of ED visits in the last 6 months: 1      Do you have any ongoing symptoms? No  Did you call your PCP prior to going to the ED? Yes, no appointment available.     Review of Discharge Instructions:   [x] AVS discharge instructions  [x] Right Care, Right Place, Right Time document  [x] Medication changes  [x] Follow up appointments  [x] Referral follow up     Care Summary Note: ACM spoke with the patient’s daughter and Nomi DE LEON, who reported that overall, the patient is doing well. The patient is currently being treated with Cipro for a urinary tract infection , and aside from this, there are no new concerns regarding the patient’s condition. Nomi did note that the patient’s Khanna catheter appears to be leaking, and she plans to contact urology today for further guidance and appropriate management. The patient is currently in no acute distress. The daughter has consistently demonstrated the ability to manage her father's care, including effective medication management, routine symptom monitoring, and adherence to the prescribed treatment

## 2025-06-25 ENCOUNTER — APPOINTMENT (EMERGENCY)
Dept: RADIOLOGY | Facility: HOSPITAL | Age: 89
End: 2025-06-25
Payer: COMMERCIAL

## 2025-06-25 ENCOUNTER — HOSPITAL ENCOUNTER (EMERGENCY)
Facility: HOSPITAL | Age: 89
Discharge: HOME | End: 2025-06-25
Attending: EMERGENCY MEDICINE | Admitting: EMERGENCY MEDICINE
Payer: COMMERCIAL

## 2025-06-25 VITALS
RESPIRATION RATE: 18 BRPM | SYSTOLIC BLOOD PRESSURE: 146 MMHG | DIASTOLIC BLOOD PRESSURE: 67 MMHG | BODY MASS INDEX: 23.7 KG/M2 | HEIGHT: 72 IN | OXYGEN SATURATION: 98 % | HEART RATE: 64 BPM | TEMPERATURE: 96.8 F | WEIGHT: 175 LBS

## 2025-06-25 DIAGNOSIS — R42 LIGHTHEADEDNESS: Primary | ICD-10-CM

## 2025-06-25 DIAGNOSIS — R55 PRE-SYNCOPE: ICD-10-CM

## 2025-06-25 DIAGNOSIS — E86.0 DEHYDRATION: ICD-10-CM

## 2025-06-25 LAB
ALBUMIN SERPL-MCNC: 3.7 G/DL (ref 3.5–5.7)
ALP SERPL-CCNC: 69 IU/L (ref 34–125)
ALT SERPL-CCNC: 11 IU/L (ref 7–52)
ANION GAP SERPL CALC-SCNC: 2 MEQ/L (ref 3–15)
AST SERPL-CCNC: 17 IU/L (ref 13–39)
BASOPHILS # BLD: 0 K/UL (ref 0.01–0.1)
BASOPHILS NFR BLD: 0 %
BILIRUB SERPL-MCNC: 0.4 MG/DL (ref 0.3–1.2)
BUN SERPL-MCNC: 29 MG/DL (ref 7–25)
CALCIUM SERPL-MCNC: 9.4 MG/DL (ref 8.6–10.3)
CHLORIDE SERPL-SCNC: 109 MEQ/L (ref 98–107)
CO2 SERPL-SCNC: 26 MEQ/L (ref 21–31)
CREAT SERPL-MCNC: 1 MG/DL (ref 0.7–1.3)
DIFFERENTIAL METHOD BLD: ABNORMAL
EGFRCR SERPLBLD CKD-EPI 2021: >60 ML/MIN/1.73M*2
EOSINOPHIL # BLD: 0.09 K/UL (ref 0.04–0.54)
EOSINOPHIL NFR BLD: 2 %
ERYTHROCYTE [DISTWIDTH] IN BLOOD BY AUTOMATED COUNT: 13.4 % (ref 11.6–14.4)
GLUCOSE SERPL-MCNC: 136 MG/DL (ref 70–99)
HCT VFR BLD AUTO: 34.4 % (ref 40.1–51)
HGB BLD-MCNC: 11.2 G/DL (ref 13.7–17.5)
IMM GRANULOCYTES # BLD AUTO: 0.03 K/UL (ref 0–0.08)
IMM GRANULOCYTES NFR BLD AUTO: 0.7 %
LYMPHOCYTES # BLD: 0.95 K/UL (ref 1.2–3.5)
LYMPHOCYTES NFR BLD: 21.2 %
MAGNESIUM SERPL-MCNC: 2 MG/DL (ref 1.8–2.5)
MCH RBC QN AUTO: 31.7 PG (ref 28–33.2)
MCHC RBC AUTO-ENTMCNC: 32.6 G/DL (ref 32.2–36.5)
MCV RBC AUTO: 97.5 FL (ref 83–98)
MONOCYTES # BLD: 0.43 K/UL (ref 0.3–1)
MONOCYTES NFR BLD: 9.6 %
NEUTROPHILS # BLD: 2.98 K/UL (ref 1.7–7)
NEUTS SEG NFR BLD: 66.5 %
NRBC BLD-RTO: 0 %
PLATELET # BLD AUTO: 173 K/UL (ref 150–350)
PMV BLD AUTO: 10.2 FL (ref 9.4–12.4)
POTASSIUM SERPL-SCNC: 4.7 MEQ/L (ref 3.5–5.1)
PROT SERPL-MCNC: 6.4 G/DL (ref 6–8.2)
RBC # BLD AUTO: 3.53 M/UL (ref 4.5–5.8)
SODIUM SERPL-SCNC: 137 MEQ/L (ref 136–145)
TROPONIN I SERPL HS-MCNC: 4.1 PG/ML
TROPONIN I SERPL HS-MCNC: 5.6 PG/ML
TSH SERPL DL<=0.05 MIU/L-ACNC: 1.59 MIU/L (ref 0.34–5.6)
WBC # BLD AUTO: 4.48 K/UL (ref 3.8–10.5)

## 2025-06-25 PROCEDURE — 3E0337Z INTRODUCTION OF ELECTROLYTIC AND WATER BALANCE SUBSTANCE INTO PERIPHERAL VEIN, PERCUTANEOUS APPROACH: ICD-10-PCS | Performed by: EMERGENCY MEDICINE

## 2025-06-25 PROCEDURE — 93005 ELECTROCARDIOGRAM TRACING: CPT

## 2025-06-25 PROCEDURE — 85025 COMPLETE CBC W/AUTO DIFF WBC: CPT | Performed by: EMERGENCY MEDICINE

## 2025-06-25 PROCEDURE — 83735 ASSAY OF MAGNESIUM: CPT | Performed by: PHYSICIAN ASSISTANT

## 2025-06-25 PROCEDURE — 71045 X-RAY EXAM CHEST 1 VIEW: CPT

## 2025-06-25 PROCEDURE — 96360 HYDRATION IV INFUSION INIT: CPT

## 2025-06-25 PROCEDURE — 93005 ELECTROCARDIOGRAM TRACING: CPT | Performed by: EMERGENCY MEDICINE

## 2025-06-25 PROCEDURE — 84443 ASSAY THYROID STIM HORMONE: CPT | Performed by: PHYSICIAN ASSISTANT

## 2025-06-25 PROCEDURE — 84484 ASSAY OF TROPONIN QUANT: CPT | Mod: 91 | Performed by: EMERGENCY MEDICINE

## 2025-06-25 PROCEDURE — 36415 COLL VENOUS BLD VENIPUNCTURE: CPT

## 2025-06-25 PROCEDURE — 27200121 HC CATH FOLEY

## 2025-06-25 PROCEDURE — 80053 COMPREHEN METABOLIC PANEL: CPT | Performed by: EMERGENCY MEDICINE

## 2025-06-25 PROCEDURE — 25800000 HC PHARMACY IV SOLUTIONS: Performed by: PHYSICIAN ASSISTANT

## 2025-06-25 PROCEDURE — 84484 ASSAY OF TROPONIN QUANT: CPT | Performed by: EMERGENCY MEDICINE

## 2025-06-25 PROCEDURE — 99284 EMERGENCY DEPT VISIT MOD MDM: CPT | Mod: 25

## 2025-06-25 RX ORDER — MIRABEGRON 50 MG/1
50 TABLET, FILM COATED, EXTENDED RELEASE ORAL DAILY
COMMUNITY
Start: 2025-04-28

## 2025-06-25 RX ADMIN — SODIUM CHLORIDE 500 ML: 0.9 INJECTION, SOLUTION INTRAVENOUS at 10:26

## 2025-06-25 NOTE — DISCHARGE INSTRUCTIONS
Your EKG is stable.  Two sets of cardiac enzymes are normal.  Your blood work shows some mild dehydration for which you have been given IV fluids.  Your loop recorder has been interrogated and no acute events for today were seen.  Follow-up with your cardiologist/electrophysiologist as an outpatient for repeat assessment.  Rest at home and stay hydrated fluids.  Return to nearest emergency department for worsening symptoms, chest pain, trouble breathing, feeling he will pass out, or any other new/concerning symptoms

## 2025-06-25 NOTE — ED PROVIDER NOTES
Emergency Medicine Note  HPI   HISTORY OF PRESENT ILLNESS     89 year old M with pmhx of HTN, HLD, CAD, T2DM, afib presents to the ED for evaluation had a presyncopal event which was witnessed by his daughter.  Daughter and him went outside when patient quickly began to feel lightheaded.  Patient was able to inform warm daughter he felt this way who helped lower him to the ground.  Patient never fully lost consciousness.  Patient does have a loop recorder and they did hit the symptom button during the episode of lightheadedness.  Denies chest pain, chest tightness, dyspnea, palpitations, worsening dizziness, headaches, paresthesias, focal weakness, changes in speech/vision.  No recent long travel, recent surgeries, oral attrition use, or history of PE/DVT.  This is the third syncopal/presyncopal event since the loop recorder has been implanted.  During the 2 previous episodes, no arrhythmias/concerning findings were found upon interrogation.  No urinary incontinence, postictal state, tonic-clonic activity, or intraoral injury          Patient History   PAST HISTORY     Reviewed from Nursing Triage:       Past Medical History:   Diagnosis Date    A-fib (CMS/[facility])     CAD (coronary artery disease)     Hip fracture (CMS/[facility])     right    Hx of ventricular tachycardia     Prostate cancer (CMS/[facility])        Past Surgical History   Procedure Laterality Date    Cataract extraction Bilateral     Laminectomy      L3-L4       No family history on file.    Social History     Tobacco Use    Smoking status: Never    Smokeless tobacco: Never   Vaping Use    Vaping status: Never Used   Substance Use Topics    Alcohol use: Yes     Comment: socially    Drug use: Never         Review of Systems   REVIEW OF SYSTEMS     Review of Systems      VITALS     ED Vitals      Date/Time Temp Pulse Resp BP SpO2 Who   06/25/25 1320 -- 64 18 146/67 98 % SDB   06/25/25 1220 -- 60 16 131/61 95 % SDB   06/25/25 1131 -- 63 18 165/70 99 % SDB   06/25/25  1120 -- 66 18 209/82 97 % SDB   06/25/25 1020 -- 62 20 149/65 98 % SDB   06/25/25 0920 -- 68 15 174/70 92 % SDB   06/25/25 0919 36 °C (96.8 °F) 67 18 174/70 94 % SDB                         Physical Exam   PHYSICAL EXAM     Physical Exam  Vitals and nursing note reviewed.   Constitutional:       General: He is not in acute distress.     Appearance: Normal appearance. He is well-developed and normal weight. He is not toxic-appearing.   HENT:      Head: Normocephalic and atraumatic.   Eyes:      Conjunctiva/sclera: Conjunctivae normal.   Cardiovascular:      Rate and Rhythm: Normal rate and regular rhythm.   Pulmonary:      Effort: Pulmonary effort is normal.      Breath sounds: Normal breath sounds.   Musculoskeletal:         General: No tenderness or deformity. Normal range of motion.      Cervical back: Normal range of motion and neck supple.      Comments: No LE edema. Calves are symmetrical in appearance without any erythema, tenderness, or palpable cords.    Skin:     General: Skin is warm and dry.   Neurological:      Mental Status: He is alert and oriented to person, place, and time. Mental status is at baseline.   Psychiatric:         Mood and Affect: Mood normal.         Behavior: Behavior normal.           PROCEDURES     Procedures     DATA     Results       Procedure Component Value Units Date/Time    TSH w reflex FT4 [[id number]]  (Normal) Collected: 06/25/25 0930    Specimen: Blood, Venous Updated: 06/25/25 1025     TSH 1.59 mIU/L     HS Troponin (with 2 hour reflex) [[id number]]  (Normal) Collected: 06/25/25 0930    Specimen: Blood, Venous Updated: 06/25/25 1016     High Sens Troponin I 4.1 pg/mL     Comprehensive metabolic panel [[id number]]  (Abnormal) Collected: 06/25/25 0930    Specimen: Blood, Venous Updated: 06/25/25 1010     Sodium 137 mEQ/L      Potassium 4.7 mEQ/L      Comment: Results obtained on plasma. Plasma Potassium values may be up to 0.4 mEQ/L less than serum values. The differences may be  greater for patients with high platelet or white cell counts.        Chloride 109 mEQ/L      CO2 26 mEQ/L      BUN 29 mg/dL      Creatinine 1.0 mg/dL      Glucose 136 mg/dL      Calcium 9.4 mg/dL      AST (SGOT) 17 IU/L      ALT (SGPT) 11 IU/L      Alkaline Phosphatase 69 IU/L      Total Protein 6.4 g/dL      Comment: Test performed on plasma which typically contains approximately 0.4 g/dL more protein than serum.        Albumin 3.7 g/dL      Bilirubin, Total 0.4 mg/dL      eGFR >60.0 mL/min/1.73m*2      Comment: Calculation based on the Chronic Kidney Disease Epidemiology Collaboration (CKD-EPI) equation refit without adjustment for race.        Anion Gap 2 mEQ/L     Magnesium [[id number]]  (Normal) Collected: 06/25/25 0930    Specimen: Blood, Venous Updated: 06/25/25 1010     Magnesium 2.0 mg/dL     CBC and differential [[id number]]  (Abnormal) Collected: 06/25/25 0930    Specimen: Blood, Venous Updated: 06/25/25 0958     WBC 4.48 K/uL      RBC 3.53 M/uL      Hemoglobin 11.2 g/dL      Hematocrit 34.4 %      MCV 97.5 fL      MCH 31.7 pg      MCHC 32.6 g/dL      RDW 13.4 %      Platelets 173 K/uL      MPV 10.2 fL      Differential Type Auto     nRBC 0.0 %      Immature Granulocytes 0.7 %      Neutrophils 66.5 %      Lymphocytes 21.2 %      Monocytes 9.6 %      Eosinophils 2.0 %      Basophils 0.0 %      Immature Granulocytes, Absolute 0.03 K/uL      Neutrophils, Absolute 2.98 K/uL      Lymphocytes, Absolute 0.95 K/uL      Monocytes, Absolute 0.43 K/uL      Eosinophils, Absolute 0.09 K/uL      Basophils, Absolute 0.00 K/uL     [facility] Draw Panel [[id number]] Collected: 06/25/25 0930    Specimen: Blood, Venous Updated: 06/25/25 0942    Narrative:      The following orders were created for panel order [facility] Draw Panel.  Procedure                               Abnormality         Status                     ---------                               -----------         ------                     RAINBOW LT BLUE[[id number]]                                   In process                   Please view results for these tests on the individual orders.    CINTHIA ELDER [[id number]] Collected: 06/25/25 0930    Specimen: Blood, Venous Updated: 06/25/25 0942            Imaging Results              X-RAY CHEST 1 VIEW (Final result)  Result time 06/25/25 12:25:16      Final result                   Impression:    IMPRESSION:  No discrete infiltrate or edema.                   Narrative:      CLINICAL HISTORY:  Cardiac evaluation    COMMENT:    Views: Portable frontal    Comparison date: May 2024.    The heart and mediastinal contours are within normal limits cardiac recorder  noted.  The trachea is midline.  The lungs are clear without discrete  infiltrate.  There  are no pleural effusions.  The osseous structures are intact  with degenerative change.                                          ECG 12 lead   Independent Interpretation by ED Provider   Rhythm: [NSR]  Rate: 68  P waves: [normal interval]  QRS: [normal QRS]  Axis: [normal]  ST Segments: [no obvious ST elevation or ischemia]            Scoring tools                                  ED Course & MDM   MDM / ED COURSE / CLINICAL IMPRESSION / DISPO     Medical Decision Making  Given history, exam and workup, low suspicion for HF, ICH (no trauma, headache), seizure (no witnessed seizure like activity, no postictal period, tongue laceration, bladder incontinence), stroke (no focal neuro deficits),  ACS (neg troponin, no anginal pain), aortic dissection (no chest pain), malignant arrhythmia on ekg, or GI bleed (stable hgb). Low suspicion for PE given normal vital signs, absence of chest pain or dyspnea, no evidence of DVT, no recent surgery/immobilization.    Problems Addressed:  Dehydration: acute illness or injury  Lightheadedness: acute illness or injury  Pre-syncope: acute illness or injury    Amount and/or Complexity of Data Reviewed  Labs: ordered.  Radiology: ordered. Decision-making  details documented in ED Course.  ECG/medicine tests: ordered and independent interpretation performed.        ED Course as of 06/26/25 0854   Wed Jun 25, 2025   1233 X-RAY CHEST 1 VIEW  IMPRESSION:  No discrete infiltrate or edema.   [CM]   1238 Serial troponin stable.  EKG nonischemic.  Lab work shows a mild dehydration but otherwise unremarkable [CM]   1313 [employer] Loop recorder interrogated.  Report reviewed on iPad with ED attending.  No acute events seen [CM]      ED Course User Index  [CM] Kelin Tineo PA C     Clinical Impression      Lightheadedness   Pre-syncope   Dehydration     _________________       ED Disposition   Discharge                       Kelin Tineo PA C  06/26/25 0854

## 2025-06-26 LAB
ATRIAL RATE: 68
P AXIS: 81
PR INTERVAL: 180
QRS DURATION: 88
QT INTERVAL: 450
QTC CALCULATION(BAZETT): 478
R AXIS: 53
T WAVE AXIS: 41
VENTRICULAR RATE: 68

## 2025-06-26 PROCEDURE — 93010 ELECTROCARDIOGRAM REPORT: CPT | Performed by: INTERNAL MEDICINE

## 2025-06-26 NOTE — ED ATTESTATION NOTE
I have personally seen and examined Jose Fuentes.  I was involved in the care and medical decision making for this patient.    I reviewed and agree with physician assistant / nurse practitioner’s assessment and plan of care; any exceptions are documented below.      My focused history, examination, assessment and plan of care of Jose Fuentes is as follows:    Brief History:  HPI  89-year-old male with history of coronary disease, diabetes, atrial fibrillation presents to the emergency department for evaluation of a near syncopal event which occurred this morning.  The patient was apparently walking to her garden this morning when he began to feel lightheaded and his daughter was concerned he was going to pass out.  The patient denies any chest pain or shortness of breath.  He did not hit his head on the ground.  He is asymptomatic at present time.  Denies any headaches.  No chest, back or abdominal pain.  He has been compliant with his home medications.  He does have a loop recorder and has had similar episodes before. Had echo about 1 year ago showing normal EF, Grade 1 diastolic dysfunction.      Focused Physical Exam:  Physical Exam  Vitals and nursing note reviewed.   Constitutional:       General: He is in acute distress.      Appearance: Normal appearance. He is normal weight. He is not toxic-appearing or diaphoretic.   HENT:      Head: Normocephalic and atraumatic.      Nose: Nose normal.      Mouth/Throat:      Mouth: Mucous membranes are moist.   Eyes:      Pupils: Pupils are equal, round, and reactive to light.   Cardiovascular:      Rate and Rhythm: Normal rate.      Pulses: Normal pulses.   Pulmonary:      Effort: Pulmonary effort is normal. No respiratory distress.      Breath sounds: Normal breath sounds.   Abdominal:      Palpations: Abdomen is soft.      Tenderness: There is no abdominal tenderness. There is no guarding or rebound.   Musculoskeletal:         General: Normal range of  motion.      Cervical back: Normal range of motion and neck supple. No tenderness.   Skin:     General: Skin is warm.      Capillary Refill: Capillary refill takes less than 2 seconds.      Findings: No rash.   Neurological:      General: No focal deficit present.      Mental Status: He is alert and oriented to person, place, and time. Mental status is at baseline.   Psychiatric:         Mood and Affect: Mood normal.         Behavior: Behavior normal.           Assessment / Plan:        Medical Decision Making  Suspect symptoms possibly secondary to vasovagal event, possible orthostasis.  The patient has a benign exam here.  His vital signs are within normal limits.  His EKG appears benign.  He has a nonfocal neurological exam here.  Will check labs, chest x-ray, gently hydrate with IV fluids and reassess.  Will also try to interrogate his loop recorder.  Will reassess once workup is complete.  Consider admission for observation versus close outpatient follow-up with his PCP and ED return precautions if workup here is within normal limits.    Amount and/or Complexity of Data Reviewed  Labs: ordered.  Radiology: ordered. Decision-making details documented in ED Course.  ECG/medicine tests: ordered and independent interpretation performed.        I was physically present for the key/critical portions of the following procedures:  Procedures           Jaison Almodovar MD  06/26/25 0818